# Patient Record
Sex: FEMALE | Race: WHITE | NOT HISPANIC OR LATINO | Employment: UNEMPLOYED | URBAN - METROPOLITAN AREA
[De-identification: names, ages, dates, MRNs, and addresses within clinical notes are randomized per-mention and may not be internally consistent; named-entity substitution may affect disease eponyms.]

---

## 2017-01-11 ENCOUNTER — LAB REQUISITION (OUTPATIENT)
Dept: LAB | Facility: HOSPITAL | Age: 42
End: 2017-01-11
Payer: COMMERCIAL

## 2017-01-11 DIAGNOSIS — R87.619 ABNORMAL CYTOLOGICAL FINDING IN SPECIMEN FROM CERVIX UTERI: ICD-10-CM

## 2017-01-11 PROCEDURE — 88305 TISSUE EXAM BY PATHOLOGIST: CPT | Performed by: OBSTETRICS & GYNECOLOGY

## 2017-01-13 ENCOUNTER — GENERIC CONVERSION - ENCOUNTER (OUTPATIENT)
Dept: OTHER | Facility: OTHER | Age: 42
End: 2017-01-13

## 2017-01-14 LAB — INTERPRETATION (HISTORICAL): NORMAL

## 2017-01-15 ENCOUNTER — GENERIC CONVERSION - ENCOUNTER (OUTPATIENT)
Dept: OTHER | Facility: OTHER | Age: 42
End: 2017-01-15

## 2017-01-18 ENCOUNTER — ALLSCRIPTS OFFICE VISIT (OUTPATIENT)
Dept: OTHER | Facility: OTHER | Age: 42
End: 2017-01-18

## 2017-02-22 ENCOUNTER — ALLSCRIPTS OFFICE VISIT (OUTPATIENT)
Dept: OTHER | Facility: OTHER | Age: 42
End: 2017-02-22

## 2017-02-22 LAB
BILIRUB UR QL STRIP: NORMAL
CLARITY UR: NORMAL
COLOR UR: NORMAL
GLUCOSE (HISTORICAL): NORMAL
HGB UR QL STRIP.AUTO: 250
KETONES UR STRIP-MCNC: 15 MG/DL
LEUKOCYTE ESTERASE UR QL STRIP: 500
NITRITE UR QL STRIP: NORMAL
PH UR STRIP.AUTO: 5 [PH]
PROT UR STRIP-MCNC: 30 MG/DL
SP GR UR STRIP.AUTO: 1.03
UROBILINOGEN UR QL STRIP.AUTO: 1

## 2017-02-26 LAB
CULTURE RESULT (HISTORICAL): NORMAL
MISCELLANEOUS LAB TEST RESULT (HISTORICAL): NORMAL

## 2017-03-01 RX ORDER — DIPHENOXYLATE HYDROCHLORIDE AND ATROPINE SULFATE 2.5; .025 MG/1; MG/1
1 TABLET ORAL EVERY MORNING
COMMUNITY
End: 2018-04-01

## 2017-03-01 RX ORDER — ONDANSETRON 4 MG/1
4 TABLET, FILM COATED ORAL EVERY 8 HOURS PRN
Status: ON HOLD | COMMUNITY
End: 2018-04-15

## 2017-03-01 RX ORDER — NAPROXEN 500 MG/1
500 TABLET ORAL AS NEEDED
COMMUNITY
End: 2018-04-02 | Stop reason: HOSPADM

## 2017-03-01 RX ORDER — MULTIVIT WITH MINERALS/LUTEIN
1000 TABLET ORAL EVERY MORNING
COMMUNITY
End: 2018-04-01

## 2017-03-01 RX ORDER — LEVOTHYROXINE SODIUM 0.1 MG/1
125 TABLET ORAL EVERY MORNING
COMMUNITY
End: 2018-09-17 | Stop reason: SDUPTHER

## 2017-03-02 ENCOUNTER — HOSPITAL ENCOUNTER (OUTPATIENT)
Facility: HOSPITAL | Age: 42
Setting detail: OUTPATIENT SURGERY
Discharge: HOME | End: 2017-03-02
Attending: OBSTETRICS & GYNECOLOGY | Admitting: OBSTETRICS & GYNECOLOGY
Payer: COMMERCIAL

## 2017-03-02 ENCOUNTER — ANESTHESIA (OUTPATIENT)
Dept: PERIOP | Facility: HOSPITAL | Age: 42
End: 2017-03-02
Payer: COMMERCIAL

## 2017-03-02 ENCOUNTER — ANESTHESIA EVENT (OUTPATIENT)
Dept: PERIOP | Facility: HOSPITAL | Age: 42
End: 2017-03-02
Payer: COMMERCIAL

## 2017-03-02 VITALS
HEIGHT: 64 IN | DIASTOLIC BLOOD PRESSURE: 94 MMHG | RESPIRATION RATE: 20 BRPM | TEMPERATURE: 98.2 F | SYSTOLIC BLOOD PRESSURE: 146 MMHG | WEIGHT: 195 LBS | HEART RATE: 62 BPM | BODY MASS INDEX: 33.29 KG/M2 | OXYGEN SATURATION: 94 %

## 2017-03-02 DIAGNOSIS — N87.1 MODERATE CERVICAL DYSPLASIA: ICD-10-CM

## 2017-03-02 PROBLEM — D06.9 CIN III (CERVICAL INTRAEPITHELIAL NEOPLASIA III): Status: ACTIVE | Noted: 2017-03-02

## 2017-03-02 LAB — EXT PREGNANCY TEST URINE: NEGATIVE

## 2017-03-02 PROCEDURE — 88344 IMHCHEM/IMCYTCHM EA MLT ANTB: CPT | Performed by: OBSTETRICS & GYNECOLOGY

## 2017-03-02 PROCEDURE — 88307 TISSUE EXAM BY PATHOLOGIST: CPT | Performed by: OBSTETRICS & GYNECOLOGY

## 2017-03-02 PROCEDURE — 81025 URINE PREGNANCY TEST: CPT | Performed by: OBSTETRICS & GYNECOLOGY

## 2017-03-02 RX ORDER — SODIUM CHLORIDE, SODIUM LACTATE, POTASSIUM CHLORIDE, CALCIUM CHLORIDE 600; 310; 30; 20 MG/100ML; MG/100ML; MG/100ML; MG/100ML
100 INJECTION, SOLUTION INTRAVENOUS CONTINUOUS
Status: DISCONTINUED | OUTPATIENT
Start: 2017-03-02 | End: 2017-03-02 | Stop reason: HOSPADM

## 2017-03-02 RX ORDER — ONDANSETRON 2 MG/ML
INJECTION INTRAMUSCULAR; INTRAVENOUS AS NEEDED
Status: DISCONTINUED | OUTPATIENT
Start: 2017-03-02 | End: 2017-03-02 | Stop reason: SURG

## 2017-03-02 RX ORDER — FENTANYL CITRATE/PF 50 MCG/ML
25 SYRINGE (ML) INJECTION
Status: DISCONTINUED | OUTPATIENT
Start: 2017-03-02 | End: 2017-03-02 | Stop reason: HOSPADM

## 2017-03-02 RX ORDER — FENTANYL CITRATE 50 UG/ML
INJECTION, SOLUTION INTRAMUSCULAR; INTRAVENOUS AS NEEDED
Status: DISCONTINUED | OUTPATIENT
Start: 2017-03-02 | End: 2017-03-02 | Stop reason: SURG

## 2017-03-02 RX ORDER — LIDOCAINE HYDROCHLORIDE 10 MG/ML
INJECTION, SOLUTION INFILTRATION; PERINEURAL AS NEEDED
Status: DISCONTINUED | OUTPATIENT
Start: 2017-03-02 | End: 2017-03-02 | Stop reason: SURG

## 2017-03-02 RX ORDER — SODIUM CHLORIDE, SODIUM LACTATE, POTASSIUM CHLORIDE, CALCIUM CHLORIDE 600; 310; 30; 20 MG/100ML; MG/100ML; MG/100ML; MG/100ML
INJECTION, SOLUTION INTRAVENOUS CONTINUOUS PRN
Status: DISCONTINUED | OUTPATIENT
Start: 2017-03-02 | End: 2017-03-02 | Stop reason: SURG

## 2017-03-02 RX ORDER — ONDANSETRON 2 MG/ML
4 INJECTION INTRAMUSCULAR; INTRAVENOUS ONCE
Status: DISCONTINUED | OUTPATIENT
Start: 2017-03-02 | End: 2017-03-02 | Stop reason: HOSPADM

## 2017-03-02 RX ORDER — MIDAZOLAM HYDROCHLORIDE 1 MG/ML
INJECTION INTRAMUSCULAR; INTRAVENOUS AS NEEDED
Status: DISCONTINUED | OUTPATIENT
Start: 2017-03-02 | End: 2017-03-02 | Stop reason: SURG

## 2017-03-02 RX ORDER — LIDOCAINE HYDROCHLORIDE 10 MG/ML
INJECTION, SOLUTION EPIDURAL; INFILTRATION; INTRACAUDAL; PERINEURAL AS NEEDED
Status: DISCONTINUED | OUTPATIENT
Start: 2017-03-02 | End: 2017-03-02 | Stop reason: HOSPADM

## 2017-03-02 RX ORDER — KETOROLAC TROMETHAMINE 30 MG/ML
60 INJECTION, SOLUTION INTRAMUSCULAR; INTRAVENOUS ONCE
Status: COMPLETED | OUTPATIENT
Start: 2017-03-02 | End: 2017-03-02

## 2017-03-02 RX ORDER — PROPOFOL 10 MG/ML
INJECTION, EMULSION INTRAVENOUS AS NEEDED
Status: DISCONTINUED | OUTPATIENT
Start: 2017-03-02 | End: 2017-03-02 | Stop reason: SURG

## 2017-03-02 RX ADMIN — FENTANYL CITRATE 50 MCG: 50 INJECTION, SOLUTION INTRAMUSCULAR; INTRAVENOUS at 08:08

## 2017-03-02 RX ADMIN — SODIUM CHLORIDE, SODIUM LACTATE, POTASSIUM CHLORIDE, AND CALCIUM CHLORIDE 100 ML/HR: .6; .31; .03; .02 INJECTION, SOLUTION INTRAVENOUS at 10:03

## 2017-03-02 RX ADMIN — PROPOFOL 200 MG: 10 INJECTION, EMULSION INTRAVENOUS at 07:58

## 2017-03-02 RX ADMIN — FENTANYL CITRATE 50 MCG: 50 INJECTION, SOLUTION INTRAMUSCULAR; INTRAVENOUS at 07:58

## 2017-03-02 RX ADMIN — ONDANSETRON 4 MG: 2 INJECTION INTRAMUSCULAR; INTRAVENOUS at 08:21

## 2017-03-02 RX ADMIN — MIDAZOLAM HYDROCHLORIDE 2 MG: 1 INJECTION, SOLUTION INTRAMUSCULAR; INTRAVENOUS at 07:55

## 2017-03-02 RX ADMIN — KETOROLAC TROMETHAMINE 60 MG: 30 INJECTION, SOLUTION INTRAMUSCULAR at 09:24

## 2017-03-02 RX ADMIN — LIDOCAINE HYDROCHLORIDE 50 MG: 10 INJECTION, SOLUTION INFILTRATION; PERINEURAL at 07:58

## 2017-03-02 RX ADMIN — DEXAMETHASONE SODIUM PHOSPHATE 8 MG: 10 INJECTION INTRAMUSCULAR; INTRAVENOUS at 08:02

## 2017-03-02 RX ADMIN — SODIUM CHLORIDE, SODIUM LACTATE, POTASSIUM CHLORIDE, AND CALCIUM CHLORIDE: .6; .31; .03; .02 INJECTION, SOLUTION INTRAVENOUS at 07:54

## 2017-11-29 ENCOUNTER — GENERIC CONVERSION - ENCOUNTER (OUTPATIENT)
Dept: OTHER | Facility: OTHER | Age: 42
End: 2017-11-29

## 2017-12-29 ENCOUNTER — GENERIC CONVERSION - ENCOUNTER (OUTPATIENT)
Dept: OTHER | Facility: OTHER | Age: 42
End: 2017-12-29

## 2017-12-29 LAB
BASOPHILS # BLD AUTO: 0.1 X10E3/UL (ref 0–0.2)
BASOPHILS # BLD AUTO: 1 %
DEPRECATED RDW RBC AUTO: 13.3 % (ref 12.3–15.4)
EOSINOPHIL # BLD AUTO: 0.2 X10E3/UL (ref 0–0.4)
EOSINOPHIL # BLD AUTO: 3 %
HCT VFR BLD AUTO: 41.7 % (ref 34–46.6)
HGB BLD-MCNC: 14.1 G/DL (ref 11.1–15.9)
IMM.GRANULOCYTES (CD4/8) (HISTORICAL): 0 %
IMM.GRANULOCYTES (CD4/8) (HISTORICAL): 0 X10E3/UL (ref 0–0.1)
LYMPHOCYTES # BLD AUTO: 2.7 X10E3/UL (ref 0.7–3.1)
LYMPHOCYTES # BLD AUTO: 38 %
MCH RBC QN AUTO: 31.9 PG (ref 26.6–33)
MCHC RBC AUTO-ENTMCNC: 33.8 G/DL (ref 31.5–35.7)
MCV RBC AUTO: 94 FL (ref 79–97)
MONOCYTES # BLD AUTO: 0.5 X10E3/UL (ref 0.1–0.9)
MONOCYTES (HISTORICAL): 8 %
NEUTROPHILS # BLD AUTO: 3.5 X10E3/UL (ref 1.4–7)
NEUTROPHILS # BLD AUTO: 50 %
PLATELET # BLD AUTO: 220 X10E3/UL (ref 150–379)
RBC (HISTORICAL): 4.42 X10E6/UL (ref 3.77–5.28)
WBC # BLD AUTO: 7.1 X10E3/UL (ref 3.4–10.8)

## 2017-12-30 ENCOUNTER — GENERIC CONVERSION - ENCOUNTER (OUTPATIENT)
Dept: OTHER | Facility: OTHER | Age: 42
End: 2017-12-30

## 2017-12-30 LAB
25(OH)D3 SERPL-MCNC: 24.3 NG/ML (ref 30–100)
A/G RATIO (HISTORICAL): 1.5 (ref 1.2–2.2)
ALBUMIN SERPL BCP-MCNC: 4.1 G/DL (ref 3.5–5.5)
ALP SERPL-CCNC: 87 IU/L (ref 39–117)
ALT SERPL W P-5'-P-CCNC: 11 IU/L (ref 0–32)
AST SERPL W P-5'-P-CCNC: 13 IU/L (ref 0–40)
BILIRUB SERPL-MCNC: 0.4 MG/DL (ref 0–1.2)
BUN SERPL-MCNC: 10 MG/DL (ref 6–24)
BUN/CREA RATIO (HISTORICAL): 15 (ref 9–23)
CALCIUM SERPL-MCNC: 8.9 MG/DL (ref 8.7–10.2)
CHLORIDE SERPL-SCNC: 100 MMOL/L (ref 96–106)
CHOLEST SERPL-MCNC: 227 MG/DL (ref 100–199)
CHOLEST/HDLC SERPL: 4.9 RATIO UNITS (ref 0–4.4)
CO2 SERPL-SCNC: 26 MMOL/L (ref 18–29)
CREAT SERPL-MCNC: 0.68 MG/DL (ref 0.57–1)
EGFR AFRICAN AMERICAN (HISTORICAL): 126 ML/MIN/1.73
EGFR-AMERICAN CALC (HISTORICAL): 109 ML/MIN/1.73
GLUCOSE SERPL-MCNC: 114 MG/DL (ref 65–99)
HDLC SERPL-MCNC: 46 MG/DL
INTERPRETATION (HISTORICAL): NORMAL
LDLC SERPL CALC-MCNC: 147 MG/DL (ref 0–99)
POTASSIUM SERPL-SCNC: 3.8 MMOL/L (ref 3.5–5.2)
SODIUM SERPL-SCNC: 140 MMOL/L (ref 134–144)
T4 FREE SERPL-MCNC: 6.7 UG/DL (ref 4.5–12)
TOT. GLOBULIN, SERUM (HISTORICAL): 2.8 G/DL (ref 1.5–4.5)
TOTAL PROTEIN (HISTORICAL): 6.9 G/DL (ref 6–8.5)
TRIGL SERPL-MCNC: 171 MG/DL (ref 0–149)
TSH SERPL DL<=0.05 MIU/L-ACNC: 5.69 UIU/ML (ref 0.45–4.5)
VLDLC SERPL CALC-MCNC: 34 MG/DL (ref 5–40)

## 2018-01-03 ENCOUNTER — GENERIC CONVERSION - ENCOUNTER (OUTPATIENT)
Dept: OTHER | Facility: OTHER | Age: 43
End: 2018-01-03

## 2018-01-10 ENCOUNTER — GENERIC CONVERSION - ENCOUNTER (OUTPATIENT)
Dept: OTHER | Facility: OTHER | Age: 43
End: 2018-01-10

## 2018-01-10 ENCOUNTER — ALLSCRIPTS OFFICE VISIT (OUTPATIENT)
Dept: OTHER | Facility: OTHER | Age: 43
End: 2018-01-10

## 2018-01-12 VITALS
DIASTOLIC BLOOD PRESSURE: 96 MMHG | WEIGHT: 199 LBS | RESPIRATION RATE: 16 BRPM | BODY MASS INDEX: 33.97 KG/M2 | SYSTOLIC BLOOD PRESSURE: 136 MMHG | HEART RATE: 84 BPM | TEMPERATURE: 96.1 F | HEIGHT: 64 IN

## 2018-01-12 NOTE — RESULT NOTES
Verified Results  (1) CBC/PLT/DIFF [id number] 07:32AM Lenny Carrillo     Test Name Result Flag Reference   WBC 6 6 x10E3/uL  3 4-10 8   RBC 4 31 x10E6/uL  3 77-5 28   Hemoglobin 13 8 g/dL  11 1-15 9   Hematocrit 41 7 %  34 0-46  6   MCV 97 fL  79-97   MCH 32 0 pg  26 6-33 0   MCHC 33 1 g/dL  31 5-35 7   RDW 14 4 %  12 3-15 4   Platelets 549 A14F4/RE  150-379   Neutrophils 47 %     Lymphs 44 %     Monocytes 7 %     Eos 2 %     Basos 0 %     Neutrophils (Absolute) 3 1 x10E3/uL  1 4-7 0   Lymphs (Absolute) 2 9 x10E3/uL  0 7-3 1   Monocytes(Absolute) 0 5 x10E3/uL  0 1-0 9   Eos (Absolute) 0 2 x10E3/uL  0 0-0 4   Baso (Absolute) 0 0 x10E3/uL  0 0-0 2   Immature Granulocytes 0 %     Immature Grans (Abs) 0 0 x10E3/uL  0 0-0 1     (1) COMPREHENSIVE METABOLIC PANEL [id number] 82:36VH Lenny Carrillo     Test Name Result Flag Reference   Glucose, Serum 101 mg/dL H 65-99   BUN 12 mg/dL  6-24   Creatinine, Serum 0 78 mg/dL  0 57-1 00   eGFR If NonAfricn Am 95 mL/min/1 73  >59   eGFR If Africn Am 109 mL/min/1 73  >59   BUN/Creatinine Ratio 15  9-23   Sodium, Serum 141 mmol/L  134-144   Potassium, Serum 4 1 mmol/L  3 5-5 2   Chloride, Serum 99 mmol/L     Carbon Dioxide, Total 26 mmol/L  18-29   Calcium, Serum 9 1 mg/dL  8 7-10 2   Protein, Total, Serum 6 7 g/dL  6 0-8 5   Albumin, Serum 3 9 g/dL  3 5-5 5   Globulin, Total 2 8 g/dL  1 5-4 5   A/G Ratio 1 4  1 1-2 5   Bilirubin, Total 0 5 mg/dL  0 0-1 2   Alkaline Phosphatase, S 83 IU/L     AST (SGOT) 12 IU/L  0-40   ALT (SGPT) 10 IU/L  0-32     (1) LIPID PANEL, FASTING [id number] 07:32AM Lenny Carrillo     Test Name Result Flag Reference   Cholesterol, Total 241 mg/dL H 100-199   Triglycerides 107 mg/dL  0-149   HDL Cholesterol 42 mg/dL  >39   VLDL Cholesterol Ezra 21 mg/dL  5-40   LDL Cholesterol Calc 178 mg/dL H 0-99   T  Chol/HDL Ratio 5 7 ratio units H 0 0-4 4   T   Chol/HDL Ratio                                                             Men  Women 1/2 Avg  Risk  3 4    3 3                                                   Avg Risk  5 0    4 4                                                2X Avg  Risk  9 6    7 1                                                3X Avg  Risk 23 4   11 0     (1) HEMOGLOBIN A1C [id number] 07:32AM Caterina Carrillo     Test Name Result Flag Reference   Hemoglobin A1c 6 0 % H 4 8-5 6   Pre-diabetes: 5 7 - 6 4           Diabetes: >6 4           Glycemic control for adults with diabetes: <7 0     [facility]) Cardiovascular Risk Assessment [id number] 07:32AM Caterina Carrillo     Test Name Result Flag Reference   Interpretation Note     Supplement report is available  PDF Image

## 2018-01-13 NOTE — PROGRESS NOTES
Assessment    1  Encounter for preventive health examination (V70 0) (Z00 00)   2  Adult BMI 34 0-34 9 kg/sq m (V85 34) (Z68 34)    Plan  Hypothyroidism    · (1) TSH WITH FT4 REFLEX; Status:Active; Requested YKD:[id number];    · Follow-up visit in 1 year Evaluation and Treatment  Follow-up  Status: Complete  Done:  [id number]    Discussion/Summary  health maintenance visit Currently, she eats a healthy diet and has an adequate exercise regimen  cervical cancer screening is managed by gyn Breast cancer screening: the risks and benefits of breast cancer screening were discussed, mammogram has been ordered and the patient declines breast cancer screening  Colorectal cancer screening: colorectal cancer screening is not indicated  The labs reviewed by patient  Advice and education were given regarding nutrition, aerobic exercise, weight loss, reproductive health and contraception  Patient discussion: discussed with the patient  Chief Complaint  cpe      History of Present Illness  HM, Adult Female: The patient is being seen for a health maintenance evaluation  The last health maintenance visit was 12 month(s) ago  General Health: The patient's health since the last visit is described as good  She has regular dental visits  She denies vision problems  She denies hearing loss  Immunizations status: up to date  Lifestyle:  She consumes a diverse and healthy diet  She has weight concerns  She exercises regularly  She exercises 3 or more times per week  Exercise includes walking and aerobic conditioning  She does not use tobacco  She denies alcohol use  She denies drug use  Reproductive health: the patient is premenopausal    Screening: cancer screening reviewed and updated  metabolic screening reviewed and updated  risk screening reviewed and updated  HPI: Here for CPE  Has had bariatric surgery and has lost about 100 pounds   Still having a lot of difficulty with nausea and will address this with her gastroenterologist       Review of Systems    Constitutional: No fever, no chills, feels well, no tiredness, no recent weight gain or weight loss  Eyes: No complaints of eye pain, no red eyes, no eyesight problems, no discharge, no dry eyes, no itching of eyes  ENT: no complaints of earache, no loss of hearing, no nose bleeds, no nasal discharge, no sore throat, no hoarseness  Cardiovascular: No complaints of slow heart rate, no fast heart rate, no chest pain, no palpitations, no leg claudication, no lower extremity edema  Respiratory: No complaints of shortness of breath, no wheezing, no cough, no SOB on exertion, no orthopnea, no PND  Gastrointestinal: as noted in HPI  Genitourinary: No complaints of dysuria, no incontinence, no pelvic pain, no dysmenorrhea, no vaginal discharge or bleeding  Musculoskeletal: No complaints of arthralgias, no myalgias, no joint swelling or stiffness, no limb pain or swelling  Integumentary: No complaints of skin rash or lesions, no itching, no skin wounds, no breast pain or lump  Neurological: No complaints of headache, no confusion, no convulsions, no numbness, no dizziness or fainting, no tingling, no limb weakness, no difficulty walking  Psychiatric: Not suicidal, no sleep disturbance, no anxiety or depression, no change in personality, no emotional problems  Endocrine: No complaints of proptosis, no hot flashes, no muscle weakness, no deepening of the voice, no feelings of weakness  Hematologic/Lymphatic: No complaints of swollen glands, no swollen glands in the neck, does not bleed easily, does not bruise easily  Active Problems    1  Anxiety (300 00) (F41 9)   2  Benign essential hypertension (401 1) (I10)   3  Contusion of bone (924 9) (T14 8)   4  Hyperlipemia, mixed (272 2) (E78 2)   5  Hypothyroidism (244 9) (E03 9)   6  Incomplete RBBB (426 4) (I45 10)   7  Need for vaccine for TD (tetanus-diphtheria) (V06 5) (Z23)   8   Pre-op exam (V72 84) (Z01 818)   9  Psoriasis (696 1) (L40 9)   10  Sleep apnea (780 57) (G47 30)   11  Vitamin D deficiency (268 9) (E55 9)    Past Medical History    · History of Acute upper respiratory infection (465 9) (J06 9)   · History of Ovarian cyst (620 2) (N83 20)    Surgical History    · History of Tonsillectomy    Family History  Mother    · Family history of Diabetes mellitus  Father    · Family history of Diabetes mellitus    Social History    · Caffeine use (V49 89) (F15 90)   · Former smoker (V15 82) (N70 340)   · Occasional alcohol use   · Uses marijuana (305 20) (F12 90)    Current Meds   1  Allegra CAPS; 1 TABLET DAILY Recorded   2  Levothyroxine Sodium 100 MCG Oral Tablet; Take 1 tablet daily  Requested for:   29GHX9704; Last Rx:[id number] Ordered   3  PARoxetine HCl - 10 MG Oral Tablet; TAKE THREE TABLETS BY MOUTH EVERY DAY; Therapy: 63BKQ8063 to (Evaluate:26Jan2017)  Requested for: 19Ajh2307; Last   Rx:50Lxg4932 Ordered    Allergies    1  naproxen    2  Almonds   3  Bee sting   4  Soy    Vitals   Recorded: 57CTX5302 11:44AM   Temperature 96 1 F   Heart Rate 84   Respiration 16   Systolic 292   Diastolic 96   Height 5 ft 4 in   Weight 199 lb    BMI Calculated 34 16   BSA Calculated 1 96     Physical Exam    Constitutional   General appearance: No acute distress, well appearing and well nourished  Eyes   Conjunctiva and lids: No swelling, erythema or discharge  Pupils and irises: Equal, round, reactive to light  Ears, Nose, Mouth, and Throat   External inspection of ears and nose: Normal     Otoscopic examination: Tympanic membranes translucent with normal light reflex  Canals patent without erythema  Hearing: Normal     Nasal mucosa, septum, and turbinates: Normal without edema or erythema  Lips, teeth, and gums: Normal, good dentition  Oropharynx: Normal with no erythema, edema, exudate or lesions  Neck   Neck: Supple, symmetric, trachea midline, no masses  Thyroid: Normal, no thyromegaly  Pulmonary   Respiratory effort: No increased work of breathing or signs of respiratory distress  Percussion of chest: Normal     Palpation of chest: Normal     Auscultation of lungs: Clear to auscultation  Cardiovascular   Palpation of heart: Normal PMI, no thrills  Auscultation of heart: Normal rate and rhythm, normal S1 and S2, no murmurs  Carotid pulses: 2+ bilaterally  Abdominal aorta: Normal     Femoral pulses: 2+ bilaterally  Pedal pulses: 2+ bilaterally  Examination of extremities for edema and/or varicosities: Normal     Abdomen   Abdomen: Non-tender, no masses  Liver and spleen: No hepatomegaly or splenomegaly  Examination for hernias: No hernia appreciated  Lymphatic   Palpation of lymph nodes in neck: No lymphadenopathy  Palpation of lymph nodes in axillae: No lymphadenopathy  Palpation of lymph nodes in groin: No lymphadenopathy  Palpation of lymph nodes in other areas: No lymphadenopathy  Musculoskeletal   Gait and station: Normal     Digits and nails: Normal without clubbing or cyanosis  Joints, bones, and muscles: Normal     Range of motion: Normal     Stability: Normal     Muscle strength/tone: Normal     Skin   Skin and subcutaneous tissue: Normal without rashes or lesions  Palpation of skin and subcutaneous tissue: Normal turgor  Neurologic   Cranial nerves: Cranial nerves II-XII intact  Reflexes: 2+ and symmetric  Sensation: No sensory loss  Psychiatric   Judgment and insight: Normal     Orientation to person, place, and time: Normal     Recent and remote memory: Intact  Mood and affect: Normal        Procedure    Procedure: Indication: routine screening     Results: 20/25 in both eyes without corrective device Pt forgot her glasses   Color vision was and the results were normal       Signatures   Electronically signed by : KARLA Alonso ; Jan 18 2017 12:17PM EST                       (Author)

## 2018-01-14 VITALS
HEART RATE: 82 BPM | DIASTOLIC BLOOD PRESSURE: 100 MMHG | RESPIRATION RATE: 18 BRPM | HEIGHT: 64 IN | SYSTOLIC BLOOD PRESSURE: 150 MMHG | WEIGHT: 204 LBS | TEMPERATURE: 97.2 F | BODY MASS INDEX: 34.83 KG/M2

## 2018-01-23 VITALS — DIASTOLIC BLOOD PRESSURE: 80 MMHG | SYSTOLIC BLOOD PRESSURE: 130 MMHG

## 2018-01-23 NOTE — PROGRESS NOTES
Assessment    1  Acute pain of left knee (932 26) (Y47 857)    Plan  Acute pain of left knee    · Naproxen 500 MG Oral Tablet; TAKE 1 TABLET 3 TIMES DAILY AS NEEDED   · * MRI KNEE LEFT  WO CONTRAST; Status:Need Information - Financial  Authorization,Retrospective By Protocol Authorization; Requested [id number];     Discussion/Summary    Darline Isidro has left knee pain that is concerning for possible MCL injury  Her exam is also concerning for possible meniscal tear  I will obtain an MRI at this time to evaluate both structures  I did prescribe naproxen for her for the pain  We did give her an Ace wrap for compression  She was not comfortable wearing a hinge brace today  I did advise her to continue icing the knee  Follow-up in the office after the MRI is complete  The treatment plan was reviewed with the patient/guardian  The patient/guardian understands and agrees with the treatment plan      Chief Complaint    1  Knee Pain    History of Present Illness  HPI: Darline Isidro is a 37year old female who presents with left-sided knee pain after a twist and fall injury she sustained about 4 weeks ago  She states that she was cleaning her garage when she turned suddenly and slammed her knee into a metal chair  She then slipped and fell  She has had medial sided knee discomfort and sharp pains ever since  Her pain is sharp and stabbing on the medial aspect of the knee and radiates posteriorly  The pain worsens with walking or bending of the knee  It improves with rest and percocet  She states she has been unable to ice due to the increased pain that it causes  She denies any previous knee injury  She does report some instability and strain sensation with bending the knee  Review of Systems    Constitutional: No fever, no chills, feels well, no tiredness, no recent weight gain or loss  Eyes: No complaints of eyesight problems, no red eyes  ENT: no loss of hearing, no nosebleeds, no sore throat     Cardiovascular: No complaints of chest pain, no palpitations, no leg claudication or lower extremity edema  Respiratory: no compliants of shortness of breath, no wheezing, no cough  Gastrointestinal: no complaints of abdominal pain, no constipation, no nausea or diarrhea, no vomiting, no bloody stools  Genitourinary: no complaints of dysuria, no incontinence  Musculoskeletal: as noted in HPI  Integumentary: no complaints of skin rash or lesion, no itching or dry skin, no skin wounds  Neurological: no complaints of headache, no confusion, no numbness or tingling, no dizziness  Endocrine: No complaints of muscle weakness, no feelings of weakness, no frequent urination, no excessive thirst    Psychiatric: No suicidal thoughts, no anxiety, no feelings of depression  Active Problems    1  Acute upper respiratory infection (465 9) (J06 9)   2  Acute UTI (599 0) (N39 0)   3  Anxiety (300 00) (F41 9)   4  Benign essential hypertension (401 1) (I10)   5  Diabetes mellitus (250 00) (E11 9)   6  Hematuria (599 70) (R31 9)   7  Hyperlipemia, mixed (272 2) (E78 2)   8  Hypokalemia (276 8) (E87 6)   9  Hypothyroidism (244 9) (E03 9)   10  Incomplete RBBB (426 4) (I45 10)   11  Morbid or severe obesity due to excess calories (278 01) (E66 01)   12  Need for vaccine for TD (tetanus-diphtheria) (V06 5) (Z23)   13  Pre-op exam (V72 84) (Z01 818)   14  Psoriasis (696 1) (L40 9)   15  Sleep apnea (780 57) (G47 30)   16  Vitamin D deficiency (268 9) (E55 9)    Past Medical History    · History of Ovarian cyst (620 2) (N83 20)    Surgical History    · History of Tonsillectomy    The surgical history was reviewed and updated today  Family History  Mother    · Family history of Diabetes mellitus  Father    · Family history of Diabetes mellitus    The family history was reviewed and updated today         Social History    · Caffeine use (V49 89) (F15 90)   · Former smoker (T65 38) (K42 245)   · Occasional alcohol use   · Uses marijuana (305 20) (F12 90)  The social history was reviewed and updated today  The social history was reviewed and is unchanged  Current Meds   1  Allegra CAPS; 1 TABLET DAILY Recorded   2  Levothyroxine Sodium 100 MCG Oral Tablet; Take 1 tablet daily  Requested for:   39QWP4399; Last Rx:97Khi9639 Ordered   3  PARoxetine HCl - 10 MG Oral Tablet; TAKE THREE TABLETS BY MOUTH EVERY DAY; Therapy: 08XWA0633 to (Evaluate:50Row2311)  Requested for: 70Bao6634; Last   Rx:33Qnn7062 Ordered    The medication list was reviewed and updated today  Allergies    1  No Known Drug Allergies    2  Almonds   3  Bee sting   4  Soy    Vitals  Signs    Systolic: 673, RUE, Sitting  Diastolic: 76, RUE, Sitting  Heart Rate: 80  Height: 5 ft 4 in  Weight: 198 lb   BMI Calculated: 33 99  BSA Calculated: 1 95    Physical Exam    Right Knee: Appearance: Normal  Tenderness: medial femoral condyle and medial joint line, but not over the lateral joint line  ROM: Full  Motor: Normal  Special Test: positive medial Sabine test and positive laxity on Valgus Stress, but negative patellar grind, negative patellofemoral apprehension test, negative lateral Sabine test, negative Lachman's test, negative Posterior Drawer sign and no laxity on Varus Stress  Constitutional - General appearance: Normal    Musculoskeletal - Gait and station: Normal  Digits and nails: Normal  Muscle strength/tone: Normal    Cardiovascular - Pulses: Normal  Examination of extremities for edema and/or varicosities: Normal    Skin - Skin and subcutaneous tissue: Normal    Neurologic - Sensation: Normal    Psychiatric - Orientation to person, place, and time: Normal  Mood and affect: Normal    Eyes   Conjunctiva and lids: Normal     Pupils and irises: Normal        Results/Data    Views: AP and lateral views and a sunrise view of the right knee     Findings: no fracture, normal alignment, no bony lesions, the soft tissues were normal and joint spaces were normal       Future Appointments    Date/Time Provider Specialty Site   09/28/2016 10:30 AM KARLA Carter   Orthopedic [address] [place] [address]     Signatures   Electronically signed by : NABIL Spann ; Sep 23 2016  2:35PM EST                       (Author)

## 2018-01-23 NOTE — RESULT NOTES
Verified Results  (1) TSH WITH FT4 REFLEX [id number] 08:42AM Margarito Carrillo     Test Name Result Flag Reference   TSH 5 690 uIU/mL H 0 450-4 500   Thyroxine (T4) 6 7 ug/dL  4 5-12 0       Plan  Hypothyroidism    · From  Levothyroxine Sodium 100 MCG Oral Tablet take one tablet by mouth  every day To Levothyroxine Sodium 112 MCG Oral Tablet (Synthroid) take one tablet by  mouth daily   · (1) TSH WITH FT4 REFLEX; Status:Active;  Requested for:78Crq1595;

## 2018-01-23 NOTE — RESULT NOTES
Verified Results  (1) CBC/PLT/DIFF [id number] 08:42AM Kamila Carrillo     Test Name Result Flag Reference   WBC 7 1 x10E3/uL  3 4-10 8   RBC 4 42 x10E6/uL  3 77-5 28   Hemoglobin 14 1 g/dL  11 1-15 9   Hematocrit 41 7 %  34 0-46  6   MCV 94 fL  79-97   MCH 31 9 pg  26 6-33 0   MCHC 33 8 g/dL  31 5-35 7   RDW 13 3 %  12 3-15 4   Platelets 670 M77B8/DE  150-379   Neutrophils 50 %  Not Estab  Lymphs 38 %  Not Estab  Monocytes 8 %  Not Estab  Eos 3 %  Not Estab  Basos 1 %  Not Estab  Neutrophils (Absolute) 3 5 x10E3/uL  1 4-7 0   Lymphs (Absolute) 2 7 x10E3/uL  0 7-3 1   Monocytes(Absolute) 0 5 x10E3/uL  0 1-0 9   Eos (Absolute) 0 2 x10E3/uL  0 0-0 4   Baso (Absolute) 0 1 x10E3/uL  0 0-0 2   Immature Granulocytes 0 %  Not Estab  Immature Grans (Abs) 0 0 x10E3/uL  0 0-0 1     (1) COMPREHENSIVE METABOLIC PANEL [id number] 67:53ZT BrettFAGUOKamila     Test Name Result Flag Reference   Glucose, Serum 114 mg/dL H 65-99   BUN 10 mg/dL  6-24   Creatinine, Serum 0 68 mg/dL  0 57-1 00   BUN/Creatinine Ratio 15  9-23   Sodium, Serum 140 mmol/L  134-144   Potassium, Serum 3 8 mmol/L  3 5-5 2   Chloride, Serum 100 mmol/L     Carbon Dioxide, Total 26 mmol/L  18-29   Calcium, Serum 8 9 mg/dL  8 7-10 2   Protein, Total, Serum 6 9 g/dL  6 0-8 5   Albumin, Serum 4 1 g/dL  3 5-5 5   Globulin, Total 2 8 g/dL  1 5-4 5   A/G Ratio 1 5  1 2-2 2   Bilirubin, Total 0 4 mg/dL  0 0-1 2   Alkaline Phosphatase, S 87 IU/L     AST (SGOT) 13 IU/L  0-40   ALT (SGPT) 11 IU/L  0-32   eGFR If NonAfricn Am 109 mL/min/1 73  >59   eGFR If Africn Am 126 mL/min/1 73  >59     (1) LIPID PANEL, FASTING [id number] 08:42AM Kamila Carrillo     Test Name Result Flag Reference   Cholesterol, Total 227 mg/dL H 100-199   Triglycerides 171 mg/dL H 0-149   HDL Cholesterol 46 mg/dL  >39   VLDL Cholesterol Ezra 34 mg/dL  5-40   LDL Cholesterol Calc 147 mg/dL H 0-99   T  Chol/HDL Ratio 4 9 ratio units H 0 0-4 4   T   Chol/HDL Ratio Men  Women                                               1/2 Avg  Risk  3 4    3 3                                                   Avg Risk  5 0    4 4                                                2X Avg  Risk  9 6    7 1                                                3X Avg  Risk 23 4   11 0     (1) VITAMIN D 25-HYDROXY [id number] 08:42AM Anjelica Carrillo     Test Name Result Flag Reference   Vitamin D, 25-Hydroxy 24 3 ng/mL L 30 0-100 0   Vitamin D deficiency has been defined by the [address] practice guideline as a  level of serum 25-OH vitamin D less than 20 ng/mL (1,2)  The Endocrine Society went on to further define vitamin D  insufficiency as a level between 21 and 29 ng/mL (2)  1  IOM ([place] [employer])  2010  Dietary reference     intakes for calcium and D  [facility]: The     [place]  2  Jesus MF, Alberto CANO, Amena MARTINI, et al      Evaluation, treatment, and prevention of vitamin D     deficiency: an Endocrine Society clinical practice     guideline  JCEM  2011 Jul; 96(7):1911-30  [facility]) Cardiovascular Risk Assessment [id number] 08:42AM Alex Crockett     Test Name Result Flag Reference   Interpretation Note     Supplemental report is available  PDF Image

## 2018-01-23 NOTE — PROGRESS NOTES
Assessment    1  Encounter for preventive health examination (V70 0) (Z00 00)   2  Adult BMI 37 0-37 9 kg/sq m (V85 37) (Z68 37)    Plan  Anxiety    · PARoxetine HCl - 10 MG Oral Tablet; TAKE THREE TABLETS BY MOUTH  EVERY DAY  Psoriasis    · Calcipotriene 0 005 % External Cream; APPLY A THIN LAYER TO ELBOWS  TWICE DAILY    Discussion/Summary  health maintenance visit Currently, she eats a healthy diet and has an adequate exercise regimen  cervical cancer screening is managed by gyn Breast cancer screening: monthly self breast exam was advised and the patient declines breast cancer screening  Colorectal cancer screening: the risks and benefits of colorectal cancer screening were discussed and colorectal cancer screening is not indicated  Advice and education were given regarding nutrition, aerobic exercise, weight loss, alcohol use, sunscreen use and seat belt use  Patient discussion: discussed with the patient  She will call if her premenstrual symptoms do not improve after a couple of cycles on her IUD  Follow up yearly or PRN  Chief Complaint  cpe      History of Present Illness  HM, Adult Female: The patient is being seen for a health maintenance evaluation  The last health maintenance visit was 12 month(s) ago  General Health: The patient's health since the last visit is described as good  She has regular dental visits  She denies vision problems  She denies hearing loss  Immunizations status: up to date  Lifestyle:  She consumes a diverse and healthy diet  She has weight concerns  She exercises regularly  She exercises 5 times per week  Exercise includes aerobic conditioning  She does not use tobacco  She denies alcohol use  She reports occasional alcohol use  She uses illicit drugs  Drug(s) abuse includes marijuana  Reproductive health: the patient is premenopausal    Screening: cancer screening reviewed and updated  metabolic screening reviewed and updated     risk screening reviewed and updated  HPI: Here for CPE  Feels well  She had been feeling fatigued but thyroid medication was recently adjusted and is now feeling better  Has depression the first day of her period despite taking her paxil daily  She is going for an IUD and she will wait to see if this helps  Review of Systems    Constitutional: No fever, no chills, feels well, no tiredness, no recent weight gain or weight loss  Eyes: No complaints of eye pain, no red eyes, no eyesight problems, no discharge, no dry eyes, no itching of eyes  ENT: no complaints of earache, no loss of hearing, no nose bleeds, no nasal discharge, no sore throat, no hoarseness  Cardiovascular: No complaints of slow heart rate, no fast heart rate, no chest pain, no palpitations, no leg claudication, no lower extremity edema  Respiratory: No complaints of shortness of breath, no wheezing, no cough, no SOB on exertion, no orthopnea, no PND  Gastrointestinal: No complaints of abdominal pain, no constipation, no nausea or vomiting, no diarrhea, no bloody stools  Genitourinary: No complaints of dysuria, no incontinence, no pelvic pain, no dysmenorrhea, no vaginal discharge or bleeding  Musculoskeletal: No complaints of arthralgias, no myalgias, no joint swelling or stiffness, no limb pain or swelling  Integumentary: No complaints of skin rash or lesions, no itching, no skin wounds, no breast pain or lump  Neurological: No complaints of headache, no confusion, no convulsions, no numbness, no dizziness or fainting, no tingling, no limb weakness, no difficulty walking  Psychiatric: Not suicidal, no sleep disturbance, no anxiety or depression, no change in personality, no emotional problems  Endocrine: No complaints of proptosis, no hot flashes, no muscle weakness, no deepening of the voice, no feelings of weakness     Hematologic/Lymphatic: No complaints of swollen glands, no swollen glands in the neck, does not bleed easily, does not bruise easily  Active Problems    1  Anxiety (300 00) (F41 9)   2  Benign essential hypertension (401 1) (I10)   3  Former smoker (W86 625)   4  Hyperlipemia, mixed (272 2) (E78 2)   5  Hypokalemia (276 8) (E87 6)   6  Hypothyroidism (244 9) (E03 9)   7  Incomplete RBBB (426 4) (I45 10)   8  Psoriasis (696 1) (L40 9)   9  Sleep apnea (780 57) (G47 30)   10  Vitamin D deficiency (268 9) (E55 9)    Past Medical History    · History of Acute upper respiratory infection (465 9) (J06 9)   · History of Ovarian cyst (620 2) (N83 20)    Surgical History    · History of Tonsillectomy    Family History  Mother    · Family history of Diabetes mellitus  Father    · Family history of Diabetes mellitus    Social History    · Caffeine use (V49 89) (F15 90)   · Former smoker (J99 889)   · Occasional alcohol use   · Uses marijuana (305 20) (F12 90)    Current Meds   1  Allegra CAPS; 1 TABLET DAILY Recorded   2  Levothyroxine Sodium 112 MCG Oral Tablet; take one tablet by mouth daily; Therapy: 84HWQ4160 to (Emily Matter)  Requested for: 72FNV4756; Last   Rx:03Jan2018 Ordered   3  PARoxetine HCl - 10 MG Oral Tablet; TAKE THREE TABLETS BY MOUTH EVERY DAY; Therapy: 61JDT5542 to (Evaluate:26Jan2018)  Requested for: 39IPI3282; Last   Rx:27Nov2017 Ordered    Allergies    1  Almonds   2  Bee sting   3  Soy    Vitals   Recorded: 77PMQ1381 09:40AM Recorded: 74LRU0265 09:18AM   Temperature  97 8 F   Heart Rate  84   Respiration  16   Systolic 439 170   Diastolic 80 94   BP Comments recheck    Height  5 ft 4 in   Weight  221 lb    BMI Calculated  37 93   BSA Calculated  2 04     Physical Exam    Constitutional   General appearance: Abnormal   obese  Eyes   Conjunctiva and lids: No swelling, erythema or discharge  Pupils and irises: Equal, round, reactive to light  Ears, Nose, Mouth, and Throat   External inspection of ears and nose: Normal     Otoscopic examination: Tympanic membranes translucent with normal light reflex   Canals patent without erythema  Hearing: Normal     Nasal mucosa, septum, and turbinates: Normal without edema or erythema  Lips, teeth, and gums: Normal, good dentition  Oropharynx: Normal with no erythema, edema, exudate or lesions  Neck   Neck: Supple, symmetric, trachea midline, no masses  Thyroid: Normal, no thyromegaly  Pulmonary   Respiratory effort: No increased work of breathing or signs of respiratory distress  Percussion of chest: Normal     Palpation of chest: Normal     Auscultation of lungs: Clear to auscultation  Cardiovascular   Palpation of heart: Normal PMI, no thrills  Auscultation of heart: Normal rate and rhythm, normal S1 and S2, no murmurs  Carotid pulses: 2+ bilaterally  Abdominal aorta: Normal     Femoral pulses: 2+ bilaterally  Pedal pulses: 2+ bilaterally  Examination of extremities for edema and/or varicosities: Normal     Abdomen   Abdomen: Non-tender, no masses  Liver and spleen: No hepatomegaly or splenomegaly  Examination for hernias: No hernia appreciated  Lymphatic   Palpation of lymph nodes in neck: No lymphadenopathy  Palpation of lymph nodes in axillae: No lymphadenopathy  Palpation of lymph nodes in groin: No lymphadenopathy  Palpation of lymph nodes in other areas: No lymphadenopathy  Musculoskeletal   Gait and station: Normal     Digits and nails: Normal without clubbing or cyanosis  Joints, bones, and muscles: Normal     Range of motion: Normal     Stability: Normal     Muscle strength/tone: Normal     Skin   Skin and subcutaneous tissue: Normal without rashes or lesions  Palpation of skin and subcutaneous tissue: Normal turgor  Neurologic   Cranial nerves: Cranial nerves II-XII intact  Reflexes: 2+ and symmetric  Sensation: No sensory loss  Psychiatric   Judgment and insight: Normal     Orientation to person, place, and time: Normal     Recent and remote memory: Intact      Mood and affect: Normal  Results/Data  PHQ-2 Adult Depression Screening 44OOA8133 09:24AM User, Ahs     Test Name Result Flag Reference   PHQ-2 Adult Depression Score 0     Over the last two weeks, how often have you been bothered by any of the following problems? Little interest or pleasure in doing things: Not at all - 0  Feeling down, depressed, or hopeless: Not at all - 0   PHQ-2 Adult Depression Screening Negative         Procedure    Procedure: Indication: routine screening     Results: 20/20 in both eyes with corrective device, 20/20 in the right eye with corrective device, 20/20 in the left eye with corrective device   Color vision was and the results were normal       Signatures   Electronically signed by : Lavina Gosselin, M D ; Contreras 10 2018  9:45AM EST                       (Author)

## 2018-01-24 VITALS
DIASTOLIC BLOOD PRESSURE: 94 MMHG | HEIGHT: 64 IN | BODY MASS INDEX: 37.73 KG/M2 | RESPIRATION RATE: 16 BRPM | WEIGHT: 221 LBS | TEMPERATURE: 97.8 F | SYSTOLIC BLOOD PRESSURE: 154 MMHG | HEART RATE: 84 BPM

## 2018-04-01 ENCOUNTER — APPOINTMENT (EMERGENCY)
Dept: RADIOLOGY | Facility: HOSPITAL | Age: 43
End: 2018-04-01
Payer: COMMERCIAL

## 2018-04-01 ENCOUNTER — HOSPITAL ENCOUNTER (OUTPATIENT)
Facility: HOSPITAL | Age: 43
Setting detail: OBSERVATION
Discharge: HOME | End: 2018-04-02
Attending: EMERGENCY MEDICINE | Admitting: FAMILY MEDICINE
Payer: COMMERCIAL

## 2018-04-01 DIAGNOSIS — R51.9 INTRACTABLE HEADACHE: Primary | ICD-10-CM

## 2018-04-01 DIAGNOSIS — M54.2 NECK PAIN: ICD-10-CM

## 2018-04-01 PROBLEM — Z98.84 STATUS POST LAPAROSCOPIC SLEEVE GASTRECTOMY: Status: ACTIVE | Noted: 2018-04-01

## 2018-04-01 PROBLEM — E03.9 HYPOTHYROIDISM: Status: ACTIVE | Noted: 2018-04-01

## 2018-04-01 LAB
ANION GAP SERPL CALCULATED.3IONS-SCNC: 6 MMOL/L (ref 4–13)
APTT PPP: 25 SECONDS (ref 23–35)
BASOPHILS # BLD AUTO: 0 THOUSANDS/ΜL (ref 0–0.1)
BASOPHILS NFR BLD AUTO: 1 % (ref 0–1)
BUN SERPL-MCNC: 8 MG/DL (ref 5–25)
CALCIUM SERPL-MCNC: 8.8 MG/DL (ref 8.3–10.1)
CHLORIDE SERPL-SCNC: 102 MMOL/L (ref 100–108)
CO2 SERPL-SCNC: 32 MMOL/L (ref 21–32)
CREAT SERPL-MCNC: 0.73 MG/DL (ref 0.6–1.3)
EOSINOPHIL # BLD AUTO: 0.2 THOUSAND/ΜL (ref 0–0.61)
EOSINOPHIL NFR BLD AUTO: 4 % (ref 0–6)
ERYTHROCYTE [DISTWIDTH] IN BLOOD BY AUTOMATED COUNT: 15 % (ref 11.6–15.1)
GFR SERPL CREATININE-BSD FRML MDRD: 102 ML/MIN/1.73SQ M
GLUCOSE SERPL-MCNC: 116 MG/DL (ref 65–140)
HCT VFR BLD AUTO: 40.4 % (ref 37–47)
HGB BLD-MCNC: 13.7 G/DL (ref 12–16)
INR PPP: 0.95 (ref 0.86–1.16)
LYMPHOCYTES # BLD AUTO: 2.4 THOUSANDS/ΜL (ref 0.6–4.47)
LYMPHOCYTES NFR BLD AUTO: 37 % (ref 14–44)
MCH RBC QN AUTO: 32.8 PG (ref 27–31)
MCHC RBC AUTO-ENTMCNC: 33.9 G/DL (ref 31.4–37.4)
MCV RBC AUTO: 97 FL (ref 82–98)
MONOCYTES # BLD AUTO: 0.5 THOUSAND/ΜL (ref 0.17–1.22)
MONOCYTES NFR BLD AUTO: 8 % (ref 4–12)
NEUTROPHILS # BLD AUTO: 3.3 THOUSANDS/ΜL (ref 1.85–7.62)
NEUTS SEG NFR BLD AUTO: 51 % (ref 43–75)
NRBC BLD AUTO-RTO: 0 /100 WBCS
PLATELET # BLD AUTO: 224 THOUSANDS/UL (ref 130–400)
PMV BLD AUTO: 7.1 FL (ref 8.9–12.7)
POTASSIUM SERPL-SCNC: 3.5 MMOL/L (ref 3.5–5.3)
PROTHROMBIN TIME: 10 SECONDS (ref 9.4–11.7)
RBC # BLD AUTO: 4.17 MILLION/UL (ref 4.2–5.4)
SODIUM SERPL-SCNC: 140 MMOL/L (ref 136–145)
WBC # BLD AUTO: 6.4 THOUSAND/UL (ref 4.8–10.8)

## 2018-04-01 PROCEDURE — 80048 BASIC METABOLIC PNL TOTAL CA: CPT | Performed by: EMERGENCY MEDICINE

## 2018-04-01 PROCEDURE — 85610 PROTHROMBIN TIME: CPT | Performed by: EMERGENCY MEDICINE

## 2018-04-01 PROCEDURE — 96374 THER/PROPH/DIAG INJ IV PUSH: CPT

## 2018-04-01 PROCEDURE — 99285 EMERGENCY DEPT VISIT HI MDM: CPT

## 2018-04-01 PROCEDURE — 87081 CULTURE SCREEN ONLY: CPT | Performed by: FAMILY MEDICINE

## 2018-04-01 PROCEDURE — 36415 COLL VENOUS BLD VENIPUNCTURE: CPT | Performed by: EMERGENCY MEDICINE

## 2018-04-01 PROCEDURE — 85025 COMPLETE CBC W/AUTO DIFF WBC: CPT | Performed by: EMERGENCY MEDICINE

## 2018-04-01 PROCEDURE — 70496 CT ANGIOGRAPHY HEAD: CPT

## 2018-04-01 PROCEDURE — 99219 PR INITIAL OBSERVATION CARE/DAY 50 MINUTES: CPT | Performed by: NURSE PRACTITIONER

## 2018-04-01 PROCEDURE — 96372 THER/PROPH/DIAG INJ SC/IM: CPT

## 2018-04-01 PROCEDURE — 85730 THROMBOPLASTIN TIME PARTIAL: CPT | Performed by: EMERGENCY MEDICINE

## 2018-04-01 PROCEDURE — 70498 CT ANGIOGRAPHY NECK: CPT

## 2018-04-01 PROCEDURE — 72040 X-RAY EXAM NECK SPINE 2-3 VW: CPT

## 2018-04-01 PROCEDURE — 96376 TX/PRO/DX INJ SAME DRUG ADON: CPT

## 2018-04-01 PROCEDURE — 96375 TX/PRO/DX INJ NEW DRUG ADDON: CPT

## 2018-04-01 RX ORDER — HYDRALAZINE HYDROCHLORIDE 20 MG/ML
10 INJECTION INTRAMUSCULAR; INTRAVENOUS ONCE
Status: COMPLETED | OUTPATIENT
Start: 2018-04-01 | End: 2018-04-01

## 2018-04-01 RX ORDER — METOCLOPRAMIDE HYDROCHLORIDE 5 MG/ML
10 INJECTION INTRAMUSCULAR; INTRAVENOUS EVERY 6 HOURS PRN
Status: DISCONTINUED | OUTPATIENT
Start: 2018-04-01 | End: 2018-04-02 | Stop reason: HOSPADM

## 2018-04-01 RX ORDER — CYCLOBENZAPRINE HCL 10 MG
10 TABLET ORAL ONCE
Status: DISCONTINUED | OUTPATIENT
Start: 2018-04-01 | End: 2018-04-01

## 2018-04-01 RX ORDER — DIAZEPAM 2 MG/1
2 TABLET ORAL ONCE
Status: COMPLETED | OUTPATIENT
Start: 2018-04-01 | End: 2018-04-01

## 2018-04-01 RX ORDER — ONDANSETRON 2 MG/ML
4 INJECTION INTRAMUSCULAR; INTRAVENOUS ONCE
Status: COMPLETED | OUTPATIENT
Start: 2018-04-01 | End: 2018-04-01

## 2018-04-01 RX ORDER — MORPHINE SULFATE 4 MG/ML
4 INJECTION, SOLUTION INTRAMUSCULAR; INTRAVENOUS ONCE
Status: COMPLETED | OUTPATIENT
Start: 2018-04-01 | End: 2018-04-01

## 2018-04-01 RX ORDER — ONDANSETRON 2 MG/ML
4 INJECTION INTRAMUSCULAR; INTRAVENOUS EVERY 6 HOURS PRN
Status: DISCONTINUED | OUTPATIENT
Start: 2018-04-01 | End: 2018-04-01

## 2018-04-01 RX ORDER — LEVOTHYROXINE SODIUM 0.12 MG/1
125 TABLET ORAL
Status: DISCONTINUED | OUTPATIENT
Start: 2018-04-02 | End: 2018-04-02 | Stop reason: HOSPADM

## 2018-04-01 RX ORDER — HYDRALAZINE HYDROCHLORIDE 20 MG/ML
10 INJECTION INTRAMUSCULAR; INTRAVENOUS EVERY 6 HOURS PRN
Status: DISCONTINUED | OUTPATIENT
Start: 2018-04-01 | End: 2018-04-02 | Stop reason: HOSPADM

## 2018-04-01 RX ORDER — ACETAMINOPHEN 500 MG
500 TABLET ORAL EVERY 6 HOURS PRN
COMMUNITY
End: 2018-04-03 | Stop reason: ALTCHOICE

## 2018-04-01 RX ORDER — LORATADINE 10 MG/1
10 TABLET ORAL DAILY
Status: DISCONTINUED | OUTPATIENT
Start: 2018-04-02 | End: 2018-04-02 | Stop reason: HOSPADM

## 2018-04-01 RX ORDER — NAPROXEN 500 MG/1
500 TABLET ORAL ONCE
Status: COMPLETED | OUTPATIENT
Start: 2018-04-01 | End: 2018-04-01

## 2018-04-01 RX ADMIN — IOHEXOL 85 ML: 350 INJECTION, SOLUTION INTRAVENOUS at 19:02

## 2018-04-01 RX ADMIN — METOCLOPRAMIDE 10 MG: 5 INJECTION, SOLUTION INTRAMUSCULAR; INTRAVENOUS at 23:07

## 2018-04-01 RX ADMIN — NAPROXEN 500 MG: 500 TABLET ORAL at 16:08

## 2018-04-01 RX ADMIN — MORPHINE SULFATE 4 MG: 4 INJECTION INTRAVENOUS at 17:10

## 2018-04-01 RX ADMIN — ONDANSETRON 4 MG: 2 INJECTION INTRAMUSCULAR; INTRAVENOUS at 18:23

## 2018-04-01 RX ADMIN — DIAZEPAM 2 MG: 2 TABLET ORAL at 16:08

## 2018-04-01 RX ADMIN — HYDROMORPHONE HYDROCHLORIDE 1 MG: 1 INJECTION, SOLUTION INTRAMUSCULAR; INTRAVENOUS; SUBCUTANEOUS at 22:30

## 2018-04-01 RX ADMIN — HYDRALAZINE HYDROCHLORIDE 10 MG: 20 INJECTION INTRAMUSCULAR; INTRAVENOUS at 23:07

## 2018-04-01 RX ADMIN — HYDROMORPHONE HYDROCHLORIDE 0.5 MG: 1 INJECTION, SOLUTION INTRAMUSCULAR; INTRAVENOUS; SUBCUTANEOUS at 18:24

## 2018-04-01 RX ADMIN — HYDROMORPHONE HYDROCHLORIDE 1 MG: 1 INJECTION, SOLUTION INTRAMUSCULAR; INTRAVENOUS; SUBCUTANEOUS at 19:37

## 2018-04-01 NOTE — ED PROVIDER NOTES
History  Chief Complaint   Patient presents with    Neck Injury     pt states she "thinks i gave myself whiplash last night"  c/o stiffness on left side neck  43 y/o female presents with neck pain on the left side that started yesterday suddenly when she was cleaning her car and suffered a muscle spasm  No trauma, denies neck stifness  Has some pain radiates up her head  Denies fevers,chills, nausea,vomiting, numbness, parasthesias, weakness,vision changes or any other symptoms  History provided by:  Patient   used: No        Prior to Admission Medications   Prescriptions Last Dose Informant Patient Reported? Taking?    BIOTIN PO 4/1/2018 at Unknown time Self Yes Yes   Sig: Take 30,000 mcg by mouth every morning   Cetirizine HCl (ZYRTEC ALLERGY PO) 4/1/2018 at Unknown time Self Yes Yes   Sig: Take 20 mg by mouth every morning   levothyroxine 100 mcg tablet 4/1/2018 at Unknown time Self Yes Yes   Sig: Take 125 mcg by mouth every morning     naproxen (NAPROSYN) 500 mg tablet Past Week at Unknown time  Yes Yes   Sig: Take 500 mg by mouth as needed for mild pain   ondansetron (ZOFRAN) 4 mg tablet Past Week at Unknown time Self Yes Yes   Sig: Take 4 mg by mouth every 8 (eight) hours as needed for nausea or vomiting May take 2 tabs if needed      Facility-Administered Medications: None       Past Medical History:   Diagnosis Date    Abnormal ECG Dec 2015    Interventricular conduction relay disorder    Abnormal Pap smear of cervix Jan 2015    See medical records from Dr Cuco Augustine Anesthesia complication     Pt smokes marijuana 2 times/daily to control nausea-S/P gastric sleeve    Anxiety     Arthritis     hands    Disease of thyroid gland     hypo    HPV (human papilloma virus) infection Jan 2015    See medical records from Dr Cuco Augustine HTN (hypertension)     Nausea 12/2015    since gastric sleeve    Prediabetes     Urinary tract infection     chronic issue    Varicella March 1985       Past Surgical History:   Procedure Laterality Date    BARIATRIC SURGERY  Dec 2015    Gastric Sleeve    CERVICAL BIOPSY N/A 3/2/2017    Procedure: BIOPSY LEEP CERVIX;  Surgeon: Donato Aguirre MD;  Location: [facility];  Service:     CERVICAL BIOPSY  W/ LOOP ELECTRODE EXCISION  Mar 2017    See medical records from Dr William Hensley ESOPHAGOGASTRODUODENOSCOPY     [address]  12/2015    sleeve at age 45    TONSILLECTOMY      adenoids-age 35    WISDOM TOOTH EXTRACTION      x4-age 18       Family History   Problem Relation Age of Onset    Diabetes Mother      diet controlled    Diabetes Father      insulin    Other Father      agent orange     I have reviewed and agree with the history as documented  Social History   Substance Use Topics    Smoking status: Former Smoker     Packs/day: 0 25     Years: 10 00     Quit date: 2004    Smokeless tobacco: Never Used    Alcohol use 3 6 oz/week     4 Standard drinks or equivalent per week        Review of Systems   All other systems reviewed and are negative  Physical Exam  ED Triage Vitals [04/01/18 1539]   Temperature Pulse Respirations Blood Pressure SpO2   97 7 °F (36 5 °C) 80 20 (!) 170/104 95 %      Temp Source Heart Rate Source Patient Position - Orthostatic VS BP Location FiO2 (%)   Tympanic Monitor Sitting Right arm --      Pain Score       7           Orthostatic Vital Signs  Vitals:    04/02/18 0132 04/02/18 1222 04/02/18 1316 04/02/18 1450   BP: 160/90 167/90 122/75 138/65   Pulse:  60 64 59   Patient Position - Orthostatic VS: Sitting Lying Lying        Physical Exam   Constitutional: She is oriented to person, place, and time  She appears well-developed and well-nourished  HENT:   Head: Normocephalic and atraumatic  Eyes: EOM are normal  Pupils are equal, round, and reactive to light  Neck: Normal range of motion  Neck supple  Cardiovascular: Normal rate and regular rhythm      Pulmonary/Chest: Effort normal and breath sounds normal    Abdominal: Soft  Bowel sounds are normal    Musculoskeletal: Normal range of motion  Left sided cervical paravertebral tenderness noted, no midline tenderness, ROM of the neck intact and no pain with flexion or extension  Neurological: She is alert and oriented to person, place, and time  She displays normal reflexes  No cranial nerve deficit or sensory deficit  She exhibits normal muscle tone  Coordination normal    Skin: Skin is warm and dry  Psychiatric: She has a normal mood and affect  Nursing note and vitals reviewed        ED Medications  Medications   naproxen (NAPROSYN) tablet 500 mg (500 mg Oral Given 4/1/18 1608)   diazepam (VALIUM) tablet 2 mg (2 mg Oral Given 4/1/18 1608)   morphine (PF) 4 mg/mL injection 4 mg (4 mg Intramuscular Given 4/1/18 1710)   HYDROmorphone (DILAUDID) injection 1 mg (0 5 mg Intravenous Given 4/1/18 1824)   ondansetron (ZOFRAN) injection 4 mg (4 mg Intravenous Given 4/1/18 1823)   iohexol (OMNIPAQUE) 350 MG/ML injection (MULTI-DOSE) 85 mL (85 mL Intravenous Given 4/1/18 1902)   HYDROmorphone (DILAUDID) injection 1 mg (1 mg Intravenous Given 4/1/18 1937)   hydrALAZINE (APRESOLINE) injection 10 mg (10 mg Intravenous Given 4/1/18 2307)   promethazine (PHENERGAN) injection 25 mg (25 mg Intravenous Given 4/2/18 0235)   magnesium sulfate IVPB (premix) SOLN 1 g (0 g Intravenous Stopped 4/2/18 1459)   ibuprofen (MOTRIN) tablet 600 mg (600 mg Oral Given 4/2/18 1655)   diazepam (VALIUM) tablet 5 mg (5 mg Oral Given 4/2/18 1655)       Diagnostic Studies  Results Reviewed     Procedure Component Value Units Date/Time    Protime-INR [[id number]]  (Normal) Collected:  04/01/18 1809    Lab Status:  Final result Specimen:  Blood from Arm, Left Updated:  04/01/18 1845     Protime 10 0 seconds      INR 0 95    APTT [[id number]]  (Normal) Collected:  04/01/18 1809    Lab Status:  Final result Specimen:  Blood from Arm, Left Updated:  04/01/18 1845     PTT 25 seconds Narrative: Therapeutic Heparin Range = 60-90 seconds    Basic metabolic panel [[id number]] Collected:  04/01/18 1809    Lab Status:  Final result Specimen:  Blood from Arm, Left Updated:  04/01/18 1828     Sodium 140 mmol/L      Potassium 3 5 mmol/L      Chloride 102 mmol/L      CO2 32 mmol/L      Anion Gap 6 mmol/L      BUN 8 mg/dL      Creatinine 0 73 mg/dL      Glucose 116 mg/dL      Calcium 8 8 mg/dL      eGFR 102 ml/min/1 73sq m     Narrative:         National Kidney Disease Education Program recommendations are as follows:  GFR calculation is accurate only with a steady state creatinine  Chronic Kidney disease less than 60 ml/min/1 73 sq  meters  Kidney failure less than 15 ml/min/1 73 sq  meters  CBC and differential [[id number]]  (Abnormal) Collected:  04/01/18 1809    Lab Status:  Final result Specimen:  Blood from Arm, Left Updated:  04/01/18 1818     WBC 6 40 Thousand/uL      RBC 4 17 (L) Million/uL      Hemoglobin 13 7 g/dL      Hematocrit 40 4 %      MCV 97 fL      MCH 32 8 (H) pg      MCHC 33 9 g/dL      RDW 15 0 %      MPV 7 1 (L) fL      Platelets 454 Thousands/uL      nRBC 0 /100 WBCs      Neutrophils Relative 51 %      Lymphocytes Relative 37 %      Monocytes Relative 8 %      Eosinophils Relative 4 %      Basophils Relative 1 %      Neutrophils Absolute 3 30 Thousands/µL      Lymphocytes Absolute 2 40 Thousands/µL      Monocytes Absolute 0 50 Thousand/µL      Eosinophils Absolute 0 20 Thousand/µL      Basophils Absolute 0 00 Thousands/µL                  CTA head and neck with and without contrast   Final Result by Brooks Field MD (04/01 1944)         1  No evidence of hemodynamic significant stenosis  2   No evidence of acute intracranial hemorrhage  3   No acute fractures or dislocations identified although limited by noise from large field-of-view     4   If there is persistent or worsening symptomatology or focal neurologic deficit of the cervical spine, consider noncontrast cervical spine MRI which would be better for soft tissue evaluation  Workstation performed: [id number]         XR cervical spine 2 or 3 views   Final Result by Barbara Forrest MD (04/01 1800)   Inadequate visualization of C7 on lateral radiograph  If there is high clinical concern for pathology involving C7, then CT would be recommended  No acute findings within the above limitations  Workstation performed: [id number]                    Procedures  Procedures       Phone Contacts  ED Phone Contact    ED Course  ED Course                                MDM  Number of Diagnoses or Management Options  Intractable headache:   Neck pain:   Diagnosis management comments: Patient was given initially valium and naprosyn and got cervical spine XR  Patient did not improve and worsened with her symptoms so proceeded to give morphine IM and then CTA head, neck with IV pain medication  Patient did not improve and so was admitted to the hospital for worsening headache and neck pain  CTA head,neck was negative  Amount and/or Complexity of Data Reviewed  Clinical lab tests: ordered and reviewed  Tests in the radiology section of CPT®: ordered and reviewed  Tests in the medicine section of CPT®: ordered and reviewed      CritCare Time    Disposition  Final diagnoses:   Intractable headache   Neck pain     Time reflects when diagnosis was documented in both MDM as applicable and the Disposition within this note     Time User Action Codes Description Comment    4/1/2018  8:37 PM Deanna Evangelista [R51] Intractable headache     4/1/2018  8:37 PM Deanna Evangelista [M54 2] Neck pain       ED Disposition     ED Disposition Condition Comment    Admit         Follow-up Information     Follow up With Specialties Details Why Contact Info    Rosario Wilder MD Internal Medicine, Family Medicine Schedule an appointment as soon as possible for a visit in 1 week(s)  [address] [address]  [phone]      Vicki Noa Pisano MD Neurology Follow up Follow-up if headache doesn't improve  [address] [address]  [phone]          Discharge Medication List as of 4/2/2018  5:06 PM      START taking these medications    Details   diazepam (VALIUM) 5 mg tablet Take 1 tablet (5 mg total) by mouth every 12 (twelve) hours for 4 days, Starting Mon 4/2/2018, Until Fri 4/6/2018, Normal      ibuprofen (MOTRIN) 600 mg tablet Take 1 tablet (600 mg total) by mouth every 8 (eight) hours for 4 days, Starting Mon 4/2/2018, Until Fri 4/6/2018, Normal         CONTINUE these medications which have NOT CHANGED    Details   BIOTIN PO Take 30,000 mcg by mouth every morning, Until Discontinued, Historical Med      Cetirizine HCl (ZYRTEC ALLERGY PO) Take 20 mg by mouth every morning, Until Discontinued, Historical Med      levothyroxine 100 mcg tablet Take 125 mcg by mouth every morning  , Historical Med      ondansetron (ZOFRAN) 4 mg tablet Take 4 mg by mouth every 8 (eight) hours as needed for nausea or vomiting May take 2 tabs if needed, Until Discontinued, Historical Med      acetaminophen (TYLENOL) 500 mg tablet Take 500 mg by mouth every 6 (six) hours as needed for mild pain Took 2000 mg at 4 am this morning , Historical Med      PARoxetine (PAXIL) 30 mg tablet Take 30 mg by mouth every morning , Until Discontinued, Historical Med         STOP taking these medications       naproxen (NAPROSYN) 500 mg tablet Comments:   Reason for Stopping:               Outpatient Discharge Orders  Discharge Diet     Activity as tolerated     Call provider for:  severe uncontrolled pain     Call provider for:  persistent nausea or vomiting     Call provider for:  difficulty breathing, headache or visual disturbances     Call provider for:  persistent dizziness or light-headedness         ED Provider  Electronically Signed by           Karen Hernandez DO  04/04/18 5243

## 2018-04-01 NOTE — ED NOTES
Pt returns from [address] [place] [address], ambulatory with coffee in hand       Emliy Yuan RN  04/01/18 0855

## 2018-04-02 VITALS
TEMPERATURE: 98.1 F | HEART RATE: 59 BPM | WEIGHT: 215 LBS | BODY MASS INDEX: 36.7 KG/M2 | HEIGHT: 64 IN | RESPIRATION RATE: 16 BRPM | SYSTOLIC BLOOD PRESSURE: 138 MMHG | DIASTOLIC BLOOD PRESSURE: 65 MMHG | OXYGEN SATURATION: 96 %

## 2018-04-02 PROBLEM — F12.10 MARIJUANA ABUSE: Status: ACTIVE | Noted: 2018-04-02

## 2018-04-02 LAB
T4 FREE SERPL-MCNC: 1.12 NG/DL (ref 0.76–1.46)
TSH SERPL DL<=0.05 MIU/L-ACNC: 4.04 UIU/ML (ref 0.36–3.74)
VIT B12 SERPL-MCNC: 219 PG/ML (ref 100–900)

## 2018-04-02 PROCEDURE — 99244 OFF/OP CNSLTJ NEW/EST MOD 40: CPT | Performed by: PSYCHIATRY & NEUROLOGY

## 2018-04-02 PROCEDURE — 82607 VITAMIN B-12: CPT | Performed by: NURSE PRACTITIONER

## 2018-04-02 PROCEDURE — 99239 HOSP IP/OBS DSCHRG MGMT >30: CPT | Performed by: FAMILY MEDICINE

## 2018-04-02 PROCEDURE — 84443 ASSAY THYROID STIM HORMONE: CPT | Performed by: NURSE PRACTITIONER

## 2018-04-02 PROCEDURE — 84439 ASSAY OF FREE THYROXINE: CPT | Performed by: NURSE PRACTITIONER

## 2018-04-02 RX ORDER — DIAZEPAM 5 MG/1
5 TABLET ORAL EVERY 12 HOURS
Qty: 10 TABLET | Refills: 0 | Status: ON HOLD | OUTPATIENT
Start: 2018-04-02 | End: 2018-04-15

## 2018-04-02 RX ORDER — KETOROLAC TROMETHAMINE 30 MG/ML
15 INJECTION, SOLUTION INTRAMUSCULAR; INTRAVENOUS EVERY 6 HOURS PRN
Status: DISCONTINUED | OUTPATIENT
Start: 2018-04-02 | End: 2018-04-02 | Stop reason: HOSPADM

## 2018-04-02 RX ORDER — BUTALBITAL, ACETAMINOPHEN AND CAFFEINE 50; 325; 40 MG/1; MG/1; MG/1
1 TABLET ORAL EVERY 4 HOURS PRN
Status: DISCONTINUED | OUTPATIENT
Start: 2018-04-02 | End: 2018-04-02 | Stop reason: HOSPADM

## 2018-04-02 RX ORDER — PAROXETINE HYDROCHLORIDE 20 MG/1
30 TABLET, FILM COATED ORAL DAILY
Status: DISCONTINUED | OUTPATIENT
Start: 2018-04-02 | End: 2018-04-02 | Stop reason: HOSPADM

## 2018-04-02 RX ORDER — IBUPROFEN 600 MG/1
600 TABLET ORAL EVERY 8 HOURS SCHEDULED
Qty: 30 TABLET | Refills: 0 | Status: ON HOLD | OUTPATIENT
Start: 2018-04-02 | End: 2018-04-15

## 2018-04-02 RX ORDER — MAGNESIUM SULFATE 1 G/100ML
1 INJECTION INTRAVENOUS ONCE
Status: COMPLETED | OUTPATIENT
Start: 2018-04-02 | End: 2018-04-02

## 2018-04-02 RX ORDER — LEVALBUTEROL INHALATION SOLUTION 0.63 MG/3ML
0.63 SOLUTION RESPIRATORY (INHALATION) EVERY 4 HOURS PRN
Status: DISCONTINUED | OUTPATIENT
Start: 2018-04-02 | End: 2018-04-02

## 2018-04-02 RX ORDER — KETOROLAC TROMETHAMINE 30 MG/ML
15 INJECTION, SOLUTION INTRAMUSCULAR; INTRAVENOUS EVERY 6 HOURS PRN
Status: DISCONTINUED | OUTPATIENT
Start: 2018-04-02 | End: 2018-04-02

## 2018-04-02 RX ORDER — PROMETHAZINE HYDROCHLORIDE 25 MG/ML
25 INJECTION, SOLUTION INTRAMUSCULAR; INTRAVENOUS ONCE
Status: COMPLETED | OUTPATIENT
Start: 2018-04-02 | End: 2018-04-02

## 2018-04-02 RX ORDER — DIAZEPAM 5 MG/1
5 TABLET ORAL ONCE
Status: COMPLETED | OUTPATIENT
Start: 2018-04-02 | End: 2018-04-02

## 2018-04-02 RX ORDER — IBUPROFEN 600 MG/1
600 TABLET ORAL ONCE
Status: COMPLETED | OUTPATIENT
Start: 2018-04-02 | End: 2018-04-02

## 2018-04-02 RX ADMIN — METOCLOPRAMIDE 10 MG: 5 INJECTION, SOLUTION INTRAMUSCULAR; INTRAVENOUS at 12:28

## 2018-04-02 RX ADMIN — HYDROMORPHONE HYDROCHLORIDE 0.5 MG: 1 INJECTION, SOLUTION INTRAMUSCULAR; INTRAVENOUS; SUBCUTANEOUS at 09:59

## 2018-04-02 RX ADMIN — IBUPROFEN 600 MG: 600 TABLET, FILM COATED ORAL at 16:55

## 2018-04-02 RX ADMIN — HYDROMORPHONE HYDROCHLORIDE 0.5 MG: 1 INJECTION, SOLUTION INTRAMUSCULAR; INTRAVENOUS; SUBCUTANEOUS at 14:50

## 2018-04-02 RX ADMIN — MAGNESIUM SULFATE HEPTAHYDRATE 1 G: 1 INJECTION, SOLUTION INTRAVENOUS at 12:27

## 2018-04-02 RX ADMIN — DIAZEPAM 5 MG: 5 TABLET ORAL at 16:55

## 2018-04-02 RX ADMIN — PAROXETINE HYDROCHLORIDE 30 MG: 20 TABLET, FILM COATED ORAL at 12:26

## 2018-04-02 RX ADMIN — PROMETHAZINE HYDROCHLORIDE 25 MG: 25 INJECTION INTRAMUSCULAR; INTRAVENOUS at 02:35

## 2018-04-02 RX ADMIN — LORATADINE 10 MG: 10 TABLET ORAL at 12:27

## 2018-04-02 NOTE — ASSESSMENT & PLAN NOTE
· Lost 85 lbs since the surgery in 12/2015  · Body mass index is 36 9 kg/m²  Diet and lifestyle modification

## 2018-04-02 NOTE — ASSESSMENT & PLAN NOTE
· Pain located in left occipital region with radiation to entire head  + photosensitivity, associated with dizziness, nausea  Pain started after she "pulled a muscle" in neck yesterday while cleaning her car per patient  · X-ray cervical spine unremarkable  CTA head and neck no acute findings  · No focal neuro deficit    · B12 low normal, TSH slightly elevated on Synthroid     · Pain controlled with IV Dilaudid  · Per neurology, OK to discharge on Ibuprofen 600 mg TID and Valium 5 mg BID for 3-4 days   · Improve compliance with Synthroid   · F/u with PCP  · F/u with neurology if headache doesn't improve

## 2018-04-02 NOTE — H&P
H&P- Ирина Massing 1975, 44 y o  female MRN: [id number]    Unit/Bed#: [address] Encounter: [id number]    Primary Care Provider: Eze Prater MD   Date and time admitted to hospital: 4/1/2018  3:38 PM        * Intractable headache   Assessment & Plan    · Pain located in left occipital region with radiation to entire head  + photosensitivity, associated with dizziness, nausea  Pain started after she "pulled a muscle" in neck yesterday while cleaning her car per patient  · X-ray cervical spine unremarkable  CTA head and neck no acute findings  · No focal neuro deficit  · Patient refused IV Toradol, IV Benadryl  Prefers iv dilaudid as she received it in ED  Will continue IV Dilaudid p r n  antiemetic p r n  · Check B12, TSH  · Will consult Neurology  HTN (hypertension)   Assessment & Plan    · History of hypertension before gastric sleeve  -190's since ED arrival   Could be reactive to pain  · IV hydralazine p r n   Monitor  Hypothyroidism   Assessment & Plan    Continue Synthroid  Check TSH, free T4  Marijuana abuse   Assessment & Plan    Check urine drug screen  Counseling provided  Anxiety   Assessment & Plan    Continue Paxil  Status post laparoscopic sleeve gastrectomy   Assessment & Plan    · Lost 85 lbs since the surgery in 12/2015  · Body mass index is 36 9 kg/m²  Diet and lifestyle modification  VTE Prophylaxis: low risk   / sequential compression device   Code Status: Full code  POLST: POLST form is not discussed and not completed at this time  Anticipated Length of Stay:  Patient will be admitted on an Observation basis with an anticipated length of stay of  < 2 midnights  Justification for Hospital Stay:  Intractable headache  Total Time for Visit, including Counseling / Coordination of Care: 45 minutes  Greater than 50% of this total time spent on direct patient counseling and coordination of care      Chief Complaint:   Neck pain and headache  History of Present Illness: Dejuan Winston is a 44 y o  female with PMH of anxiety, hypothyroid, hypertension, marijuana abuse who presents with neck pain and headache  Patient states she pulled a muscle in neck yesterday while she was cleaning her car  She heard a "pop" when that happened  [place] like she gave herself "whiplash"  She states 1 particular spot in back of head on left side is very tender,and the pain shoots up to her entire head,left side is worse than right side  The pain is constant, throbbing/pressure in nature, turning head around and lights aggravates the pain, associated with nausea, dizziness  She took Tylenol at home without relief  She states pain was worse after taking x ray because she turned her head in order to do neck x ray  She denies chest pains, SOB, vomiting, diarrhea constipation  She denies neck stiffness  She denies numbness, tingling, weakness to extremities  She denies double vision, blurry vision, trouble speaking  She reports headache now and then in the past,but no diagnosis of migraine headache in the past     In ED, X-RAY CERVICAL SPINE, CT HEAD AND NECK were unremarkable  Patient received Valium PO, Dilaudid IV x2, morphine IM x1, naproxen PO, Zofran IV in ED  Review of Systems:    Review of Systems   Gastrointestinal: Positive for nausea  Musculoskeletal: Positive for neck pain  Neurological: Positive for dizziness and headaches  All other systems reviewed and are negative        Past Medical and Surgical History:     Past Medical History:   Diagnosis Date    Anesthesia complication     Pt smokes marijuana 2 times/daily to control nausea-S/P gastric sleeve    Anxiety     Arthritis     hands    Disease of thyroid gland     hypo    HTN (hypertension)     Nausea 12/2015    since gastric sleeve    Prediabetes     Urinary tract infection     chronic issue       Past Surgical History:   Procedure Laterality Date  CERVICAL BIOPSY N/A 3/2/2017    Procedure: BIOPSY LEEP CERVIX;  Surgeon: Romana Cohn, MD;  [facility]: [facility];  Service:     ESOPHAGOGASTRODUODENOSCOPY      GASTRIC RESTRICTION SURGERY  12/2015    sleeve at age 45    TONSILLECTOMY      adenoids-age 35    WISDOM TOOTH EXTRACTION      x4-age 18       Meds/Allergies:    Prior to Admission medications    Medication Sig Start Date End Date Taking? Authorizing Provider   acetaminophen (TYLENOL) 500 mg tablet Take 500 mg by mouth every 6 (six) hours as needed for mild pain Took 2000 mg at 4 am this morning  Yes Historical Provider, MD   BIOTIN PO Take 30,000 mcg by mouth every morning   Yes Historical Provider, MD   Cetirizine HCl (ZYRTEC ALLERGY PO) Take 20 mg by mouth every morning   Yes Historical Provider, MD   levothyroxine 100 mcg tablet Take 125 mcg by mouth every morning     Yes Historical Provider, MD   naproxen (NAPROSYN) 500 mg tablet Take 500 mg by mouth as needed for mild pain   Yes Historical Provider, MD   ondansetron (ZOFRAN) 4 mg tablet Take 4 mg by mouth every 8 (eight) hours as needed for nausea or vomiting May take 2 tabs if needed   Yes Historical Provider, MD   PARoxetine (PAXIL) 30 mg tablet Take 30 mg by mouth every morning  Yes Historical Provider, MD     I have reviewed home medications with patient personally  Allergies: Allergies   Allergen Reactions    Chilcoot (Diagnostic) Anaphylaxis     All stone nuts  Lips/tongue swell-Birch Tree allergy    Bee Venom Anaphylaxis     All over swelling/pain/redness    Soy Lecithin [Lecithin] Anaphylaxis     [place]/throat swelling-dyspnea       Social History:     Marital Status: /Civil Union   Occupation:  Unemployed  Patient Pre-hospital Living Situation:  Lives with family  Patient Pre-hospital Level of Mobility:  Independent  Patient Pre-hospital Diet Restrictions:  None    Substance Use History:   History   Alcohol Use    3 6 oz/week    6 Glasses of wine per week     History Smoking Status    Former Smoker    Packs/day: 0 25    Years: 10 00    Quit date: 2000   Smokeless Tobacco    Never Used     History   Drug Use    Types: Marijuana     Comment: last smoked today       Family History:    Family History   Problem Relation Age of Onset    Diabetes Mother      diet controlled    Diabetes Father      insulin    Other Father      agent orange       Physical Exam:     Vitals:   Blood Pressure: 160/90 (04/02/18 0132)  Pulse: 59 (04/01/18 2150)  Temperature: (!) 97 3 °F (36 3 °C) (04/01/18 2150)  Temp Source: Oral (04/01/18 2150)  Respirations: 20 (04/01/18 2150)  Height: 5' 4" (162 6 cm) (04/01/18 1539)  Weight - Scale: 97 5 kg (215 lb) (04/01/18 1539)  SpO2: 94 % (04/01/18 2150)    Physical Exam   Constitutional: She is oriented to person, place, and time  She appears well-developed and well-nourished  HENT:   Head: Normocephalic and atraumatic  Left occipital region tender  Eyes: Conjunctivae and EOM are normal  Pupils are equal, round, and reactive to light  Neck: Normal range of motion  Neck supple  Cardiovascular: Normal rate and regular rhythm  Exam reveals no gallop and no friction rub  No murmur heard  Pulmonary/Chest: Effort normal and breath sounds normal  No respiratory distress  She has no wheezes  She has no rales  Abdominal: Soft  Bowel sounds are normal  She exhibits no distension  There is no tenderness  There is no rebound  Musculoskeletal: Normal range of motion  She exhibits no edema, tenderness or deformity  Neurological: She is alert and oriented to person, place, and time  Skin: Skin is warm and dry  Psychiatric:   Patient appears anxious  Nursing note and vitals reviewed  Additional Data:     Lab Results: I have personally reviewed pertinent reports          Results from last 7 days  Lab Units 04/01/18  1809   WBC Thousand/uL 6 40   HEMOGLOBIN g/dL 13 7   HEMATOCRIT % 40 4   PLATELETS Thousands/uL 224   NEUTROS PCT % 51 LYMPHS PCT % 37   MONOS PCT % 8   EOS PCT % 4       Results from last 7 days  Lab Units 04/01/18  1809   SODIUM mmol/L 140   POTASSIUM mmol/L 3 5   CHLORIDE mmol/L 102   CO2 mmol/L 32   BUN mg/dL 8   CREATININE mg/dL 0 73   CALCIUM mg/dL 8 8   GLUCOSE RANDOM mg/dL 116       Results from last 7 days  Lab Units 04/01/18  1809   INR  0 95       Imaging: I have personally reviewed pertinent reports  Cta Head And Neck With And Without Contrast    Result Date: 4/1/2018  Narrative: CTA NECK AND BRAIN WITH AND WITHOUT CONTRAST INDICATION: Neck pain COMPARISON:   None  TECHNIQUE:  Routine CT imaging of the Brain without contrast   Post contrast imaging was performed after administration of iodinated contrast through the neck and brain  Post contrast axial 0 625 mm images timed to opacify the arterial system  3D rendering was performed on an independent workstation  MIP reconstructions performed  Coronal reconstructions were performed of the noncontrast portion of the brain  Radiation dose length product (DLP) for this visit:  1606 03 mGy-cm   This examination, like all CT scans performed in the [facility], was performed utilizing techniques to minimize radiation dose exposure, including the use of iterative reconstruction and automated exposure control  IV Contrast:  85 mL of iohexol (OMNIPAQUE)  IMAGE QUALITY:   Diagnostic FINDINGS: NONCONTRAST BRAIN PARENCHYMA:  No intracranial mass, mass effect or midline shift  No acute intracranial hemorrhage  No CT signs of acute infarction  VENTRICLES AND EXTRA-AXIAL SPACES:  Normal for patient's age  VISUALIZED ORBITS AND PARANASAL SINUSES:  Left maxillary sinus because retention cyst  CALVARIUM AND EXTRACRANIAL SOFT TISSUES:   Normal  CERVICAL VASCULATURE AORTIC ARCH AND GREAT VESSELS:  Normal aortic arch and great vessel origins  Normal visualized subclavian vessels  RIGHT VERTEBRAL ARTERY CERVICAL SEGMENT:  Normal origin   The vessel is normal in caliber throughout the neck  LEFT VERTEBRAL ARTERY CERVICAL SEGMENT:  Normal origin  The vessel is normal in caliber throughout the neck  RIGHT EXTRACRANIAL CAROTID SEGMENT:  Normal caliber common carotid artery  Normal bifurcation and cervical internal carotid artery  No stenosis or dissection  LEFT EXTRACRANIAL CAROTID SEGMENT:  Normal caliber common carotid artery  Normal bifurcation and cervical internal carotid artery  No stenosis or dissection  NASCET criteria was used to determine the degree of internal carotid artery diameter stenosis  INTRACRANIAL VASCULATURE INTERNAL CAROTID ARTERIES:  Normal enhancement of the intracranial portions of the internal carotid arteries  Normal ophthalmic artery origins  Normal ICA terminus  ANTERIOR CIRCULATION:  Symmetric A1 segments and anterior cerebral arteries with normal enhancement  Normal anterior communicating artery  MIDDLE CEREBRAL ARTERY CIRCULATION:  M1 segment and middle cerebral artery branches demonstrate normal enhancement bilaterally  DISTAL VERTEBRAL ARTERIES:  Normal distal vertebral arteries  Posterior inferior cerebellar artery origins are normal  Normal vertebral basilar junction  BASILAR ARTERY:  Basilar artery is normal in caliber  Normal superior cerebellar arteries  POSTERIOR CEREBRAL ARTERIES: Both posterior cerebral arteries arises from the basilar tip  Both arteries demonstrate normal enhancement  Normal posterior communicating arteries  DURAL VENOUS SINUSES:  Normal  NON VASCULAR ANATOMY BONY STRUCTURES:  No acute osseous abnormality  SOFT TISSUES OF THE NECK: 13 mm left thyroid partially calcified nodule    Incidental discovery of one or more thyroid nodule(s) measuring less than 1 5 cm and without suspicious features is noted in this patient who is above 28years old; according to guidelines published in the February 2015 white paper on incidental thyroid nodules in the Journal of the [employer] of Radiology Shanda Rahman), no further evaluation is recommended  THORACIC INLET:  Unremarkable  Impression: 1  No evidence of hemodynamic significant stenosis  2   No evidence of acute intracranial hemorrhage  3   No acute fractures or dislocations identified although limited by noise from large field-of-view  4   If there is persistent or worsening symptomatology or focal neurologic deficit of the cervical spine, consider noncontrast cervical spine MRI which would be better for soft tissue evaluation  Workstation performed: [id number]     Xr Cervical Spine 2 Or 3 Views    Result Date: 4/1/2018  Narrative: CERVICAL SPINE INDICATION: 41-year-old woman with neck pain  COMPARISON:  None VIEWS:  XR SPINE CERVICAL 2 OR 3 VW INJURY FINDINGS: C7 is not adequately evaluated on lateral view  The odontoid tip is obscured on open-mouth view  No evidence of fracture  Normal alignment without subluxation  The intervertebral disc spaces are preserved  Small anterior osteophyte C4-5 and C5-6  The prevertebral soft tissues are within normal limits  The lung apices are clear  Impression: Inadequate visualization of C7 on lateral radiograph  If there is high clinical concern for pathology involving C7, then CT would be recommended  No acute findings within the above limitations  Workstation performed: [id number]       EKG, Pathology, and Other Studies Reviewed on Admission:   · EKG: n/a  Allscripts Records Reviewed: Yes     ** Please Note: Dragon 360 Dictation voice to text software may have been used in the creation of this document   **

## 2018-04-02 NOTE — DISCHARGE INSTRUCTIONS
Migraine/ Muscle Spasm:   Start Ibuprofen 600 mg three times daily with Valium 5 mg twice daily for next 3-4 days pending progress   Follow-up with your PCP in 1 week         Migraine Headache   WHAT YOU NEED TO KNOW:   What is a migraine headache? A migraine is a severe headache  The pain can be so severe that it interferes with your daily activities  A migraine can last a few hours up to several days  The exact cause of migraines is not known  What can trigger a migraine headache? · Stress, eye strain, oversleeping, or not getting enough sleep    · Hormone changes in women from birth control pills, pregnancy, menopause, or during a monthly period    · Skipping meals, going too long without eating, or not drinking enough liquids    · Certain foods or drinks such as chocolate, hard cheese, red wine, or drinks that contain caffeine    · Foods that contain gluten, nitrates, MSG, or artificial sweeteners    · Sunlight, bright or flashing lights, loud noises, smoke, or strong smells    · Heat, humidity, or changes in the weather  What are the warning signs that a migraine headache is about to start? Warning signs usually start 15 to 60 minutes before the headache:  · Visual changes (auras), such as blurred vision, temporary blind or bright spots, lines, or hallucinations    · Unusual tiredness or frequent yawning    · Tingling in an arm or leg  What are the signs and symptoms of a migraine headache? A migraine headache usually begins as a dull ache around the eye or temple  The pain may get worse with movement  You may also have the following:  · Pain in your head that may increase to the point that you cannot do everyday activities    · Pain on one or both sides of your head    · Throbbing, pulsing, or pounding pain in your head    · Nausea and vomiting    · Sensitivity to light, noise, or smells  How is a migraine headache diagnosed? Your healthcare provider will ask questions about your headaches   Describe the pain and any other symptoms, such as nausea  Tell the provider if you think anything triggered the pain  The provider will also want to know what you ate and drank before the pain started  Tell the provider about any medical conditions you have or that run in your family  Include any recent stressors you have had  You may also need any of the following:  · A neurologic exam  is used to check how your pupils react to light  Your healthcare provider may check your memory, hand grasp, and balance  · CT or MRI pictures  may be taken of your brain  You may be given contrast liquid to help your brain show up better in the pictures  Tell the healthcare provider if you have ever had an allergic reaction to contrast liquid  Do not enter the MRI room with anything metal  Metal can cause serious injury  Tell the healthcare provider if you have any metal in or on your body  How is a migraine headache treated? Migraines cannot be cured  The goal of treatment is to reduce your symptoms  Take medicine as soon as you feel a migraine begin  · Prescription pain medicine  may be given  Do not wait until the pain is severe before you take your medicine  · Migraine medicines  are used to help prevent a migraine or stop it once it starts  · Antinausea medicine  may be given to calm your stomach and to help prevent vomiting  This medicine can also help relieve pain  What can I do to manage my symptoms? · Rest in a dark, quiet room  This will help decrease your pain  Sleep may also help relieve the pain  · Apply ice to decrease pain  Use an ice pack, or put crushed ice in a plastic bag  Cover the ice pack with a towel and place it on your head  Apply ice for 15 to 20 minutes every hour  · Apply heat to decrease pain and muscle spasms  Use a small towel dampened with warm water or a heating pad, or sit in a warm bath  Apply heat on the area for 20 to 30 minutes every 2 hours   You may alternate heat and ice     · Keep a migraine record  Write down when your migraines start and stop  Include your symptoms and what you were doing when a migraine began  Record what you ate or drank for 24 hours before the migraine started  Keep track of what you did to treat your migraine and if it worked  Bring the migraine record with you to visits with your healthcare provider  What can I do to prevent another migraine headache? · Do not smoke  Nicotine and other chemicals in cigarettes and cigars can trigger a migraine or make it worse  Ask your healthcare provider for information if you currently smoke and need help to quit  E-cigarettes or smokeless tobacco still contain nicotine  Talk to your healthcare provider before you use these products  · Do not drink alcohol  Alcohol can trigger a migraine  It can also keep medicines used to treat your migraines from working  · Get regular exercise  Exercise may help prevent migraines  Talk to your healthcare provider about the best exercise plan for you  Try to get at least 30 minutes of exercise on most days  · Manage stress  Stress may trigger a migraine  Learn new ways to relax, such as deep breathing  · Create a sleep schedule  Go to bed and get up at the same times each day  Do not watch television before bed  · Eat regular meals  Include healthy foods such as include fruit, vegetables, whole-grain breads, low-fat dairy products, beans, lean meat, and fish  Do not have food or drinks that trigger your migraines  When should I seek immediate care? · You have a headache that seems different or much worse than your usual migraine headache  · You have a severe headache with a fever or a stiff neck  · You have new problems with speech, vision, balance, or movement  · You feel like you are going to faint, you become confused, or you have a seizure  When should I contact my healthcare provider?    · Your migraines interfere with your daily activities  · Your medicines or treatments stop working  · You have questions or concerns about your condition or care  CARE AGREEMENT:   You have the right to help plan your care  Learn about your health condition and how it may be treated  Discuss treatment options with your caregivers to decide what care you want to receive  You always have the right to refuse treatment  The above information is an  only  It is not intended as medical advice for individual conditions or treatments  Talk to your doctor, nurse or pharmacist before following any medical regimen to see if it is safe and effective for you  © 2017 [address] [facility] Information is for End User's use only and may not be sold, redistributed or otherwise used for commercial purposes  All illustrations and images included in CareNotes® are the copyrighted property of A D A M , Inc  or [employer]  Diazepam (By mouth)   Diazepam (dye-AZ-e-tricia)  Treats anxiety, alcohol withdrawal, muscle spasms, and seizures  Brand Name(s): Valium, diazePAM Intensol   There may be other brand names for this medicine  When This Medicine Should Not Be Used: This medicine is not right for everyone  Do not use it if you had an allergic reaction to diazepam, or if you are pregnant or breastfeeding, or you have narrow-angle glaucoma, myasthenia gravis, sleep apnea, or severe breathing problems  Do not give this medicine to any child 10months of age or younger  How to Use This Medicine:   Liquid, Tablet  · Your doctor will tell you how much medicine to use  Do not use more than directed  · Oral liquid: Measure the oral liquid medicine with a marked measuring spoon, oral syringe, or medicine cup  · This medicine should come with a Medication Guide  Ask your pharmacist for a copy if you do not have one  · Missed dose: Take a dose as soon as you remember   If it is almost time for your next dose, wait until then and take a regular dose  Do not take extra medicine to make up for a missed dose  · Store the medicine in a closed container at room temperature, away from heat, moisture, and direct light  Do not freeze the oral liquid  Drugs and Foods to Avoid:   Ask your doctor or pharmacist before using any other medicine, including over-the-counter medicines, vitamins, and herbal products  · Some medicines can affect how diazepam works  Tell your doctor if you are using any of the following:   ¨ Cimetidine, fluoxetine, fluvoxamine, ketoconazole, omeprazole, phenytoin, or theophylline  ¨ MAO inhibitor  ¨ Medicine to treat depression or mental health problem  ¨ Medicine to treat seizures  ¨ Phenothiazine medicine  · Do not drink alcohol while you are using this medicine  · Tell your doctor if you use anything else that makes you sleepy  Some examples are allergy medicine, narcotic pain medicine, and alcohol  Warnings While Using This Medicine:   · It is not safe to take this medicine during pregnancy  It could harm an unborn baby  Tell your doctor right away if you become pregnant  · Tell your doctor if you have kidney disease, liver disease, glaucoma, lung or breathing problems, or a history of drug or alcohol abuse, depression, mental health problems, or seizures  · This medicine may cause the following problems:  ¨ Respiratory depression (serious breathing problem that can be life-threatening), when used with narcotic pain medicines  · This medicine can increase thoughts of suicide  Tell your doctor right away if you start to feel depressed and have thoughts about hurting yourself  · This medicine may make you dizzy or drowsy  Do not drive or do anything else that could be dangerous until you know how this medicine affects you  · This medicine can be habit-forming  Do not use more than your prescribed dose  Call your doctor if you think your medicine is not working  · Do not stop using this medicine suddenly   Your doctor will need to slowly decrease your dose before you stop it completely  · Your doctor will do lab tests at regular visits to check on the effects of this medicine  Keep all appointments  · Keep all medicine out of the reach of children  Never share your medicine with anyone  Possible Side Effects While Using This Medicine:   Call your doctor right away if you notice any of these side effects:  · Allergic reaction: Itching or hives, swelling in your face or hands, swelling or tingling in your mouth or throat, chest tightness, trouble breathing  · Blue lips, fingernails, or skin  · Confusion, lightheadedness, dizziness, problems with muscle control or coordination  · Extreme drowsiness or weakness, slow heartbeat, trouble breathing  · Seizures or tremors  · Unusual mood or behavior, worsening depression, thoughts about hurting yourself, trouble sleeping  If you notice these less serious side effects, talk with your doctor:   · Blurred vision, changes in vision  · Headache  · Nausea, vomiting, constipation, diarrhea, stomach pain  · Tiredness  If you notice other side effects that you think are caused by this medicine, tell your doctor  Call your doctor for medical advice about side effects  You may report side effects to FDA at 2-464-FDA-6137  © 2017 [address] [employer]  Information is for End User's use only and may not be sold, redistributed or otherwise used for commercial purposes  The above information is an  only  It is not intended as medical advice for individual conditions or treatments  Talk to your doctor, nurse or pharmacist before following any medical regimen to see if it is safe and effective for you  Ibuprofen (By mouth)   Ibuprofen (eye-bue-PROE-fen)  Treats pain and fever  This medicine is an NSAID  Brand Name(s):  Advil, Advil Children's, Advil Liqui-Gels, Advil Migraine, All-Purpose First Aid Kit, Children's Ibuprofen, Children's Motrin, Comfort Pac, Concentrated Motrin Infants' Drops, Equate Ibuprofen Alexis Strength, Genpril, Good Neighbor Cap-Profen, Good Neighbor Ibuprofen Infants', [facility] Children's Ibuprofen, [facility] Ibuprofen   There may be other brand names for this medicine  When This Medicine Should Not Be Used: This medicine is not right for everyone  Do not use if you had an allergic reaction (including asthma) to ibuprofen, aspirin, or another NSAID, or right before or after heart surgery  How to Use This Medicine:   Capsule, Liquid Filled Capsule, Suspension, Tablet, Chewable Tablet  · Your doctor will tell you how much medicine to use  Do not use more than directed  · Prescription ibuprofen should come with a Medication Guide  Ask your pharmacist for the Medication Guide if you do not have one  · Follow the instructions on the medicine label if you are using this medicine without a prescription  · Take this medicine with food or milk if it upsets your stomach  · Oral liquid: Shake well just before using  Measure with a marked measuring spoon, oral syringe, or medicine cup  · Chewable tablet: Chew completely before you swallow it  Then drink some water to make sure you swallow all of the medicine  · For Children: Ask your pharmacist if you are not sure how much medicine to give a child  The dose is usually based on weight, not age  Never give more medicine than directed  · For Adults: Do not take more than 6 pills in 1 day (24 hours) unless your doctor tells you to  · Missed dose: If you take this medicine on a regular basis and miss a dose, take it as soon as you can  If it is almost time for your next dose, wait until then to use the medicine and skip the missed dose  Do not use extra medicine to make up for a missed dose  · Store the medicine in a closed container at room temperature, away from heat, moisture, and direct light  Do not freeze the oral liquid    Drugs and Foods to Avoid:   Ask your doctor or pharmacist before using any other medicine, including over-the-counter medicines, vitamins, and herbal products  · Some foods and medicine can affect how ibuprofen works  Tell your doctor if you are also using lithium, methotrexate, a blood thinner (such as warfarin), a steroid medicine (such as hydrocortisone, prednisolone, prednisone), a diuretic (water pill), or an ACE inhibitor blood pressure medicine  · Do not use any other NSAID medicine unless your doctor says it is okay  Some other NSAIDs are aspirin, diclofenac, naproxen, or celecoxib  · Do not drink alcohol while you are using this medicine  Warnings While Using This Medicine:   · Tell your doctor if you are pregnant or breastfeeding  Do not use this medicine during the later part of pregnancy  · Tell your doctor if you have kidney disease, liver disease, asthma, lupus or a similar connective tissue disease, or a history of ulcers or other digestion problems  Tell your doctor if you smoke or have heart or blood circulation problems, including high blood pressure, heart failure (CHF), or bleeding problems  · This medicine may cause the following problems:  ¨ Bleeding and ulcers in the stomach or intestines  ¨ Higher risk of heart attack or stroke  ¨ Liver damage  ¨ Kidney damage  ¨ Vision problems  · Call your doctor if symptoms get worse, pain lasts more than 10 days, or fever lasts more than 3 days  · This medicine might contain sugar or phenylalanine (aspartame)  · Tell any doctor or dentist who treats you that you are using this medicine  · Keep all medicine out of the reach of children  Never share your medicine with anyone    Possible Side Effects While Using This Medicine:   Call your doctor right away if you notice any of these side effects:  · Allergic reaction: Itching or hives, swelling in your face or hands, swelling or tingling in your mouth or throat, chest tightness, trouble breathing  · Blistering, peeling, or red skin rash  · Change in how much or how often you urinate  · Chest pain that may spread to your arms, jaw, back, or neck, trouble breathing, nausea, unusual sweating, faintness  · Chest pain, trouble breathing, weakness on one side of your body, severe headache, trouble seeing or talking, pain in your lower leg  · Dark urine or pale stools, nausea, vomiting, loss of appetite, stomach pain, yellow skin or eyes  · Fever, neck pain, stiff neck  · Severe stomach pain, vomiting blood, bloody or black, tarry stools  · Swelling in your hands, ankles, or feet, rapid weight gain  · Trouble seeing, blind spots, change in how you see colors  · Unusual bleeding, bruising, or weakness  If you notice these less serious side effects, talk with your doctor:   · Constipation, diarrhea, gas, mild upset stomach  · Dizziness, headache, ringing in the ears  If you notice other side effects that you think are caused by this medicine, tell your doctor  Call your doctor for medical advice about side effects  You may report side effects to FDA at 5-747-FDA-7300  © 2017 [address] Information is for End User's use only and may not be sold, redistributed or otherwise used for commercial purposes  The above information is an  only  It is not intended as medical advice for individual conditions or treatments  Talk to your doctor, nurse or pharmacist before following any medical regimen to see if it is safe and effective for you

## 2018-04-02 NOTE — ASSESSMENT & PLAN NOTE
· History of hypertension before gastric sleeve  -190's since ED arrival   Could be reactive to pain  · IV hydralazine p r n   Monitor

## 2018-04-02 NOTE — CASE MANAGEMENT
Initial Clinical Review    Admission: Date/Time/Statement: 4/1/18 2037    Orders Placed This Encounter   Procedures    Place in Observation (expected length of stay for this patient is less than two midnights)     Standing Status:   Standing     Number of Occurrences:   1     Order Specific Question:   Admitting Physician     Answer:   Nate Rivzi [[id number]]     Order Specific Question:   Level of Care     Answer:   Med Surg [[id number]]         ED: Date/Time/Mode of Arrival:   ED Arrival Information     Expected Arrival Acuity Means of Arrival Escorted By Service Admission Type    - 4/1/2018 15:29 Less Urgent Walk-In Spouse General Medicine Urgent    Arrival Complaint    neck and head pain          Chief Complaint:   Chief Complaint   Patient presents with    Neck Injury     pt states she "thinks i gave myself whiplash last night"  c/o stiffness on left side neck  History of Illness: Freddy Wood is a 44 y o  female with PMH of anxiety, hypothyroid, hypertension, marijuana abuse who presents with neck pain and headache  Patient states she pulled a muscle in neck yesterday while she was cleaning her car  She heard a "pop" when that happened  [place] like she gave herself "whiplash"  She states 1 particular spot in back of head on left side is very tender,and the pain shoots up to her entire head,left side is worse than right side  The pain is constant, throbbing/pressure in nature, turning head around and lights aggravates the pain, associated with nausea, dizziness  She took Tylenol at home without relief  She states pain was worse after taking x ray because she turned her head in order to do neck x ray  She denies chest pains, SOB, vomiting, diarrhea constipation  She denies neck stiffness  She denies numbness, tingling, weakness to extremities  She denies double vision, blurry vision, trouble speaking    She reports headache now and then in the past,but no diagnosis of migraine headache in the past   In ED, X-RAY CERVICAL SPINE, CT HEAD AND NECK were unremarkable  Patient received Valium PO, Dilaudid IV x2, morphine IM x1, naproxen PO, Zofran IV in ED k    ED Vital Signs:   ED Triage Vitals [04/01/18 1539]   Temperature Pulse Respirations Blood Pressure SpO2   97 7 °F (36 5 °C) 80 20 (!) 170/104 95 %      Temp Source Heart Rate Source Patient Position - Orthostatic VS BP Location FiO2 (%)   Tympanic Monitor Sitting Right arm --      Pain Score       7        Wt Readings from Last 1 Encounters:   04/01/18 97 5 kg (215 lb)       Vital Signs (abnormal): Abnormal Labs/Diagnostic Test Results:   xr cervical spine  Inadequate visualization of C7 on lateral radiograph  If there is high clinical concern for pathology involving C7, then CT would be recommended  No acute findings within the above limitations    ct head cervical spine  No evidence of hemodynamic significant stenosis  2   No evidence of acute intracranial hemorrhage  3   No acute fractures or dislocations identified although limited by noise from large field-of-view  4   If there is persistent or worsening symptomatology or focal neurologic deficit of the cervical spine, consider noncontrast cervical spine MRI which would be better for soft tissue evaluation  ED Treatment:   Medication Administration from 04/01/2018 1529 to 04/01/2018 2110       Date/Time Order Dose Route Action Action by Comments     04/01/2018 1608 naproxen (NAPROSYN) tablet 500 mg 500 mg Oral Given Leticia Montero RN      04/01/2018 1608 diazepam (VALIUM) tablet 2 mg 2 mg Oral Given Leticia Meza RN      04/01/2018 1710 morphine (PF) 4 mg/mL injection 4 mg 4 mg Intramuscular Given Galen Herbert RN      04/01/2018 1824 HYDROmorphone (DILAUDID) injection 1 mg 0 5 mg Intravenous Given Leticia Montero RN pt unable to tolerate full dose of medication, 0 5 mg IVP for half dose administered  dr anand notified       04/01/2018 1823 ondansetron (ZOFRAN) injection 4 mg 4 mg Intravenous Given Venu Meeks RN      04/01/2018 1902 iohexol (OMNIPAQUE) 350 MG/ML injection (MULTI-DOSE) 85 mL 85 mL Intravenous Given Ovidio Pate      04/01/2018 1937 HYDROmorphone (DILAUDID) injection 1 mg 1 mg Intravenous Given Blayne Bose RN           Past Medical/Surgical History: Active Ambulatory Problems     Diagnosis Date Noted    DOMO III (cervical intraepithelial neoplasia III) 03/02/2017     Resolved Ambulatory Problems     Diagnosis Date Noted    No Resolved Ambulatory Problems     Past Medical History:   Diagnosis Date    Anesthesia complication     Anxiety     Arthritis     Disease of thyroid gland     HTN (hypertension)     Nausea 12/2015    Prediabetes     Urinary tract infection        Admitting Diagnosis: Neck pain [M54 2]  Intractable headache [R51]    Age/Sex: 44 y o  female    Assessment/Plan:   * Intractable headache   Assessment & Plan     · Pain located in left occipital region with radiation to entire head  + photosensitivity, associated with dizziness, nausea  Pain started after she "pulled a muscle" in neck yesterday while cleaning her car per patient  · X-ray cervical spine unremarkable  CTA head and neck no acute findings  · No focal neuro deficit  · Patient refused IV Toradol, IV Benadryl  Prefers iv dilaudid as she received it in ED  Will continue IV Dilaudid p r n  antiemetic p r n  · Check B12, TSH  · Will consult Neurology           HTN (hypertension)   Assessment & Plan     · History of hypertension before gastric sleeve  -190's since ED arrival   Could be reactive to pain  · IV hydralazine p r n   Monitor           Hypothyroidism   Assessment & Plan     Continue Synthroid  Check TSH, free T4           Marijuana abuse   Assessment & Plan     Check urine drug screen    Counseling provided           Anxiety   Assessment & Plan     Continue Paxil           Status post laparoscopic sleeve gastrectomy   Assessment & Plan     · Lost 85 lbs since the surgery in 12/2015  · Body mass index is 36 9 kg/m²  Diet and lifestyle modification             VTE Prophylaxis: low risk   / sequential compression device   Code Status: Full code  POLST: POLST form is not discussed and not completed at this time  Anticipated Length of Stay:  Patient will be admitted on an Observation basis with an anticipated length of stay of  < 2 midnights  Justification for Hospital Stay:  Intractable headache      Admission Orders:  OBS   CONSULT NEUROLOGY  RAPID DRUG SCREN  Scheduled Meds:   Current Facility-Administered Medications:  hydrALAZINE 10 mg Intravenous Q6H PRN Cuiyin Yurik, CRNP   HYDROmorphone 0 5 mg Intravenous Q3H PRN Cuihennyn Anahirik, CRNP   HYDROmorphone 1 mg Intravenous Q3H PRN Lakiaitone Kearneyk, CRNP   levothyroxine 125 mcg Oral Early Morning Cuitone Negron, CRNP   loratadine 10 mg Oral Daily Cucr Negron, CRNP   metoclopramide 10 mg Intravenous Q6H PRN Lakiaitone Kearneyk, CRNP   PARoxetine 30 mg Oral Daily Cucr Negron, CRNP     Continuous Infusions:    PRN Meds:   hydrALAZINE    HYDROmorphone    HYDROmorphone    metoclopramide

## 2018-04-02 NOTE — ASSESSMENT & PLAN NOTE
TSH elevated at 4 041, free T4 normal  On Synthroid and dose recently increased after December's elevated TSH  Pt reports she's taking Synthroid in the morning with Paxil  She's advised to take Synthroid on an empty stomach without Paxil

## 2018-04-02 NOTE — DISCHARGE SUMMARY
Discharge- Windy Lipoma 1975, 44 y o  female MRN: [id number]    Unit/Bed#: [address] Encounter: [id number]    Primary Care Provider: Ankita Goodrich MD   Date and time admitted to hospital: 4/1/2018  3:38 PM        * Intractable headache   Assessment & Plan    · Pain located in left occipital region with radiation to entire head  + photosensitivity, associated with dizziness, nausea  Pain started after she "pulled a muscle" in neck yesterday while cleaning her car per patient  · X-ray cervical spine unremarkable  CTA head and neck no acute findings  · No focal neuro deficit  · B12 low normal, TSH slightly elevated on Synthroid     · Pain controlled with IV Dilaudid  · Per neurology, OK to discharge on Ibuprofen 600 mg TID and Valium 5 mg BID for 3-4 days   · Improve compliance with Synthroid   · F/u with PCP  · F/u with neurology if headache doesn't improve         Hypothyroidism   Assessment & Plan    TSH elevated at 4 041, free T4 normal  On Synthroid and dose recently increased after December's elevated TSH  Pt reports she's taking Synthroid in the morning with Paxil  She's advised to take Synthroid on an empty stomach without Paxil  HTN (hypertension)   Assessment & Plan    · History of hypertension before gastric sleeve  -190's since ED arrival   Likely reactive to pain  · Improved with pain control and IV magnesium  · Blood pressure check by PCP        Marijuana abuse   Assessment & Plan    Check urine drug not collected  Counseling provided  Status post laparoscopic sleeve gastrectomy   Assessment & Plan    · Lost 85 lbs since the surgery in 12/2015  · Body mass index is 36 9 kg/m²  Diet and lifestyle modification          Anxiety   Assessment & Plan    Continue Paxil                Resolved Problems  Date Reviewed: 4/2/2018    None          Consultations During Hospital Stay:  · Neurology - Dr Deirdre Muro     Procedures Performed:     · Xray cervical spine: Inadequate visualization of C7 on lateral radiograph  If there is high clinical concern for pathology involving C7, then CT would be recommended  No acute findings within the above limitations  · CTA head/ neck: 1  No evidence of hemodynamic significant stenosis  2   No evidence of acute intracranial hemorrhage  3   No acute fractures or dislocations identified although limited by noise from large field-of-view  4   If there is persistent or worsening symptomatology or focal neurologic deficit of the cervical spine, consider noncontrast cervical spine MRI which would be better for soft tissue evaluation  Significant Findings / Test Results:     · TSH slightly elevated, 4 041    Incidental Findings:   · None     Test Results Pending at Discharge (will require follow up): · None     Outpatient Tests Requested:  · TSH as outpatient after follow-up with PCP    Complications:  None    Reason for Admission: Intractable migraine     Hospital Course: Camila Lerner is a 44 y o  female patient with a PMH including anxiety, HTN, hypothyroidism, and marijuana abuse who originally presented to the hospital on 4/1/2018 due to left-sided neck spasm resulting in an intractable headache  Patient reports she was cleaning her car yesterday when she pulled a muscle in her neck  She was admitted for further evaluation and treatment  A Neurology consultation was obtained  Her headache was relieved with IV Magnesium and IV Dilaudid as needed  On day of discharge she is able to ambulate room with ease and declines complaints of lightheadedness, chest pain, shortness of breath, appetite changes, nausea, vomiting, or diarrhea  She will be discharged home on ibuprofen 600 mg TID and Valium 5 mg BID for 3-4 days  She will follow-up with her PCP in 1 week for a blood pressure check  She will follow-up with neurology if her headache doesn't improve  Of note, her TSH is mildly elevated  Her Synthroid was recently increased by her PCP   She was advised to take this on an empty stomach first thing in the morning and repeat a TSH with her PCP  Please see above list of diagnoses and related plan for additional information  Condition at Discharge: stable     Discharge Day Visit / Exam:     Subjective:  Observed patient resting in bed, sleeping but easily aroused  Reports continued left-sided muscle spasm creating a migraine  Pain radiates to her frontal lobe  No vision changes, no lightheadedness, no CP, SOB, N/V/D  Mild dizziness with movement but reports this is normal for her  Migraine controlled with prn dilaudid  Willing to trial Ibuprofen/ Valium for treatment on discharge  Vitals: Blood Pressure: 138/65 (04/02/18 1450)  Pulse: 59 (04/02/18 1450)  Temperature: 98 1 °F (36 7 °C) (04/02/18 1316)  Temp Source: Oral (04/02/18 1316)  Respirations: 16 (04/02/18 1450)  Height: 5' 4" (162 6 cm) (04/01/18 1539)  Weight - Scale: 97 5 kg (215 lb) (04/01/18 1539)  SpO2: 96 % (04/02/18 1316)  Exam:   Physical Exam   Constitutional: She is oriented to person, place, and time  She appears well-developed  No distress  HENT:   Head: Normocephalic  Neck: Normal range of motion  Cardiovascular: Normal rate and regular rhythm  Pulmonary/Chest: Effort normal and breath sounds normal  No respiratory distress  She has no wheezes  She has no rhonchi  She has no rales  Abdominal: Soft  Bowel sounds are normal  She exhibits no distension  There is no tenderness  Musculoskeletal: Normal range of motion  She exhibits no edema or tenderness  Neurological: She is alert and oriented to person, place, and time  Skin: Skin is warm and dry  No rash noted  She is not diaphoretic  Psychiatric: She has a normal mood and affect  Judgment normal    Nursing note and vitals reviewed  Discussion with Family: None    Discharge instructions/Information to patient and family:   See after visit summary for information provided to patient and family        Provisions for Follow-Up Care:  See after visit summary for information related to follow-up care and any pertinent home health orders  Disposition:     Home    For Discharges to [facility] SNF:   · Not Applicable to this Patient - Not Applicable to this Patient    Planned Readmission: None     Discharge Statement:  I spent > 30 minutes discharging the patient  This time was spent on the day of discharge  I had direct contact with the patient on the day of discharge  Greater than 50% of the total time was spent examining patient, answering all patient questions, arranging and discussing plan of care with patient as well as directly providing post-discharge instructions  Additional time then spent on discharge activities  Discharge Medications:  See after visit summary for reconciled discharge medications provided to patient and family        ** Please Note: This note has been constructed using a voice recognition system **

## 2018-04-02 NOTE — PLAN OF CARE
CARDIOVASCULAR - ADULT     Maintains optimal cardiac output and hemodynamic stability Progressing     Absence of cardiac dysrhythmias or at baseline rhythm Progressing        GASTROINTESTINAL - ADULT     Minimal or absence of nausea and/or vomiting Progressing     Maintains or returns to baseline bowel function Progressing     Maintains adequate nutritional intake Progressing        GENITOURINARY - ADULT     Maintains or returns to baseline urinary function Progressing     Absence of urinary retention Progressing        HEMATOLOGIC - ADULT     Maintains hematologic stability Progressing        METABOLIC, FLUID AND ELECTROLYTES - ADULT     Electrolytes maintained within normal limits Progressing     Fluid balance maintained Progressing        MUSCULOSKELETAL - ADULT     Maintain or return mobility to safest level of function Progressing        NEUROSENSORY - ADULT     Achieves stable or improved neurological status Progressing     Achieves maximal functionality and self care Progressing        RESPIRATORY - ADULT     Achieves optimal ventilation and oxygenation Progressing        SKIN/TISSUE INTEGRITY - ADULT     Skin integrity remains intact Progressing     Oral mucous membranes remain intact Progressing

## 2018-04-02 NOTE — ASSESSMENT & PLAN NOTE
· Pain located in left occipital region with radiation to entire head  + photosensitivity, associated with dizziness, nausea  Pain started after she "pulled a muscle" in neck yesterday while cleaning her car per patient  · X-ray cervical spine unremarkable  CTA head and neck no acute findings  · No focal neuro deficit  · Patient refused IV Toradol, IV Benadryl  Prefers iv dilaudid as she received it in ED  Will continue IV Dilaudid p r n  antiemetic p r n  · Check B12, TSH  · Will consult Neurology

## 2018-04-02 NOTE — ASSESSMENT & PLAN NOTE
· History of hypertension before gastric sleeve  -190's since ED arrival   Likely reactive to pain    · Improved with pain control and IV magnesium  · Blood pressure check by PCP

## 2018-04-02 NOTE — CONSULTS
[facility]   Neurology Initial Consult    Misael Landaverde is a 44 y o  female  18 [facility] 204/2 15 Jahaira James obtained from:   Chief Complaint   Patient presents with    Neck Injury     pt states she "thinks i gave myself whiplash last night"  c/o stiffness on left side neck  Assessment/Plan:    1  Muscle strain  2  Intractable headache like cervicogenic  3  H/o migraines    Patient likely had trapezius muscle strain and is now getting headaches  Her pain may be temporary, so will hold off on preventive medication for headache at this time  Discussed options of fioricet, migraine cocktail but she says they were tried in past and she didn't get pain relief  She had adverse effects to tramadol  Discussed use of ibuprofen 600-800mg q8h with food for next 2-3 days for pain relief with valium 5mg bid as muscle relaxant  If pain persists and/or radicular sxs, will consider MRI cervical spine  Encourage hydration  Discussed plan with patient and primary team          HPI:  Misael Landaverde is a 45 yo F with PMH of anxiety disorder, HTN presents with cc of headache  Patient was cleaning her car yesterday and turned a certain way and heard a 'pop'  She thinks she gave herself a whiplash  She suddenly started feeling severe neck pain which later turned into headaches  Patient had CTA head and neck upon arrival and it was negative for acute abnormality  Patient now reports holocephalic sharp, throbbing pain with nausea, dizziness and light sensitivity  She denies associated extremity paresthesia or weakness  Denies facial sensory disturbance, vision or speech disturbance  Patient has h/o migraines but they were rare  Patient states that dilaudid is the only medication that helps with her headache   Her neck muscle pain is now better         Past Medical History:   Diagnosis Date    Anesthesia complication     Pt smokes marijuana 2 times/daily to control nausea-S/P gastric sleeve    Anxiety     Arthritis     hands    Disease of thyroid gland     hypo    HTN (hypertension)     Nausea 12/2015    since gastric sleeve    Prediabetes     Urinary tract infection     chronic issue       Past Surgical History:   Procedure Laterality Date    CERVICAL BIOPSY N/A 3/2/2017    Procedure: BIOPSY LEEP CERVIX;  Surgeon: Mariam Stewart MD;  Location: [address] [place] [address];  Service:     ESOPHAGOGASTRODUODENOSCOPY      GASTRIC RESTRICTION SURGERY  12/2015    sleeve at age 45    TONSILLECTOMY      adenoids-age 35    WISDOM TOOTH EXTRACTION      x4-age 18       Allergies   Allergen Reactions    Plato (Diagnostic) Anaphylaxis     All stone nuts   Lips/tongue swell-Birch Tree allergy    Bee Venom Anaphylaxis     All over swelling/pain/redness    Soy Lecithin [Lecithin] Anaphylaxis     [place]/throat swelling-dyspnea         Current Facility-Administered Medications:     butalbital-acetaminophen-caffeine (FIORICET,ESGIC) -40 mg per tablet 1 tablet, 1 tablet, Oral, Q4H PRN, MARIA DEL ROSARIO Allan    hydrALAZINE (APRESOLINE) injection 10 mg, 10 mg, Intravenous, Q6H PRN, MARIAD EL ROSARIO Billy    HYDROmorphone (DILAUDID) injection 0 5 mg, 0 5 mg, Intravenous, Q4H PRN, MARIA DEL ROSARIO Allan, 0 5 mg at 04/02/18 1450    ketorolac (TORADOL) injection 15 mg, 15 mg, Intravenous, Q6H PRN, MARIA DEL ROSARIO Allan    levothyroxine tablet 125 mcg, 125 mcg, Oral, Early Morning, MARIA DEL ROSARIO Billy    loratadine (CLARITIN) tablet 10 mg, 10 mg, Oral, Daily, MARIA DEL ROSARIO Billy, 10 mg at 04/02/18 1227    metoclopramide (REGLAN) injection 10 mg, 10 mg, Intravenous, Q6H PRN, MARIA DEL ROSARIO Billy, 10 mg at 04/02/18 1228    PARoxetine (PAXIL) tablet 30 mg, 30 mg, Oral, Daily, Chacorta Negron, CRNP, 30 mg at 04/02/18 1226    Current Outpatient Prescriptions:     acetaminophen (TYLENOL) 500 mg tablet, Take 500 mg by mouth every 6 (six) hours as needed for mild pain Took 2000 mg at 4 am this morning , Disp: , Rfl:   BIOTIN PO, Take 30,000 mcg by mouth every morning, Disp: , Rfl:     Cetirizine HCl (ZYRTEC ALLERGY PO), Take 20 mg by mouth every morning, Disp: , Rfl:     levothyroxine 100 mcg tablet, Take 125 mcg by mouth every morning  , Disp: , Rfl:     ondansetron (ZOFRAN) 4 mg tablet, Take 4 mg by mouth every 8 (eight) hours as needed for nausea or vomiting May take 2 tabs if needed, Disp: , Rfl:     PARoxetine (PAXIL) 30 mg tablet, Take 30 mg by mouth every morning , Disp: , Rfl:     diazepam (VALIUM) 5 mg tablet, Take 1 tablet (5 mg total) by mouth every 12 (twelve) hours for 4 days, Disp: 10 tablet, Rfl: 0    ibuprofen (MOTRIN) 600 mg tablet, Take 1 tablet (600 mg total) by mouth every 8 (eight) hours for 4 days, Disp: 30 tablet, Rfl: 0    Social History     Social History    Marital status: /Civil Union     Spouse name: N/A    Number of children: N/A    Years of education: N/A     Occupational History    Not on file  Social History Main Topics    Smoking status: Former Smoker     Packs/day: 0 25     Years: 10 00     Quit date: 2000    Smokeless tobacco: Never Used    Alcohol use 3 6 oz/week     6 Glasses of wine per week    Drug use: Yes     Types: Marijuana      Comment: last smoked today    Sexual activity: Yes     Partners: Male     Other Topics Concern    Not on file     Social History Narrative    No narrative on file       Family History   Problem Relation Age of Onset    Diabetes Mother      diet controlled    Diabetes Father      insulin    Other Father      agent orange         Review of systems:  Please see HPI for positive symptoms  No fever, no chills, no weight change  Headache and neck pain +Ocular: No drainage, no blurred vision  HEENT:  No sore throat, earache, or congestion  No neck pain  COR:  No chest pain  No palpitations  Lungs:  no sob, wheezing,  GI:  no  nausea, no vomiting, no diarrhea, no constipation, no anorexia  :  No dysuria, frequency, or urgency   No hematuria  Musculoskeletal:  No joint pain or swelling or edema  Skin:  No rash or itching  Psychiatric:  no anxiety, no depression  Endocrine:  No polyuria or polydipsia  Physical examination:  Vitals:    04/02/18 1450   BP: 138/65   Pulse: 59   Resp: 16   Temp:    SpO2:        GENERAL APPEARANCE:  The patient is alert, oriented  He is in no acute distress  HEENT:  Head is normocephalic  The sinuses are otherwise nontender  Pupils are equal and reactive  NECK:  Supple without lymphadenopathy  HEART:  Regular rate and rhythm  LUNGS:  clear to auscultation  No crackles or wheezes are heard  ABDOMEN:  Soft, nontender, nondistended with good bowel sounds heard  EXTREMITIES:  Without cyanosis, clubbing or edema  Mental status: The patient is alert, attentive, and oriented  Speech is clear and fluent, good repetition, comprehension, and naming  he recalls 3/3 objects at 5 minutes  Cranial nerves:  CN II: Visual fields are full to confrontation  Fundoscopic exam is normal with sharp discs and no vascular changes    Pupils are 3 mm and reactive to light  CN III, IV, VI: At primary gaze, there is no eye deviation  CN V: Facial sensation is intact to pinprick in all 3 divisions bilaterally  Corneal responses are intact  CN VII: Face is symmetric with normal eye closure and smile  CN VIII: Hearing is normal to rubbing fingers  CN IX, X: Palate elevates symmetrically  Phonation is normal   CN XI: Head turning and shoulder shrug are intact  CN XII: Tongue is midline with normal movements and no atrophy  Motor: There is no pronator drift of out-stretched arms    Muscle bulk and tone are normal      Muscle exam  Arm Right Left Leg Right Left   Deltoid 5/5 5/5 Iliopsoas 5/5 5/5   Biceps 5/5 5/5 Quads 5/5 5/5   Triceps 5/5 5/5 Hamstrings 5/5 5/5   Wrist Extension 5/5 5/5 Ankle Dorsi Flexion 5/5 5/5   Wrist Flexion 5/5 5/5 Ankle Plantar Flexion 5/5 5/5   Interossei 5/5 5/5 Ankle Eversion 5/5 5/5   APB 5/5 5/5 Ankle Inversion 5/5 5/5       Reflexes   RJ BJ TJ KJ AJ Plantars De Leon's   Right 2+ 2+ 2+ 2+ 2+ Downgoing Not present   Left 2+ 2+ 2+ 2+ 2+ Downgoing Not present     Sensory:  Light touch, pinprick, position sense, and vibration sense are intact in fingers and toes  Coordination:  Rapid alternating movements and fine finger movements are intact  There is no dysmetria on finger-to-nose and heel-knee-shin  There are no abnormal or extraneous movements  Romberg negative  Gait/Stance:  Normal heel/toe walking and tandem gait  Lab Results   Component Value Date    WBC 6 40 04/01/2018    HGB 13 7 04/01/2018    HCT 40 4 04/01/2018    MCV 97 04/01/2018     04/01/2018     Lab Results   Component Value Date    HGBA1C 6 0 (H) 01/13/2017     Lab Results   Component Value Date    ALT 11 12/29/2017    AST 13 12/29/2017    ALKPHOS 87 12/29/2017    BILITOT 0 4 12/29/2017     Lab Results   Component Value Date    GLUCOSE 116 04/01/2018    CALCIUM 8 8 04/01/2018     04/01/2018    K 3 5 04/01/2018    CO2 32 04/01/2018     04/01/2018    BUN 8 04/01/2018    CREATININE 0 73 04/01/2018         Radiology          Review of reports and Independent Interpretation of images or specimens:  Cta Head And Neck With And Without Contrast    Result Date: 4/1/2018  1  No evidence of hemodynamic significant stenosis  2   No evidence of acute intracranial hemorrhage  3   No acute fractures or dislocations identified although limited by noise from large field-of-view  4   If there is persistent or worsening symptomatology or focal neurologic deficit of the cervical spine, consider noncontrast cervical spine MRI which would be better for soft tissue evaluation  Workstation performed: [id number]     Xr Cervical Spine 2 Or 3 Views    Result Date: 4/1/2018  Inadequate visualization of C7 on lateral radiograph  If there is high clinical concern for pathology involving C7, then CT would be recommended   No acute findings within the above limitations  Workstation performed: [id number]               Thank you for this consult  Total time of encounter: 60 min   More than 50% of time was spent in counseling and coordination of care of patient  KARLA De La Cruz    [facility]  [address] [place] [address],[address]  Yani Modi

## 2018-04-02 NOTE — SOCIAL WORK
DASH discussion completed  Discussed goals of making sure pt's  needs are met upon discharge, pt's preferences are taken into account, pt understands health condition, medications and symptoms to watch for after returning home and pt is aware of any follow up appointments recommended by hospital physician  PT LIVES INDEPENDENTLY WITH [facility], NO DME OR HHC NEEDS, PT USES [email or web address] FOR RX NEEDS  DCP IS TO HOME WHEN STABLE

## 2018-04-03 ENCOUNTER — OFFICE VISIT (OUTPATIENT)
Dept: OBGYN CLINIC | Facility: CLINIC | Age: 43
End: 2018-04-03
Payer: COMMERCIAL

## 2018-04-03 VITALS
WEIGHT: 226 LBS | DIASTOLIC BLOOD PRESSURE: 94 MMHG | BODY MASS INDEX: 38.58 KG/M2 | SYSTOLIC BLOOD PRESSURE: 132 MMHG | HEIGHT: 64 IN

## 2018-04-03 DIAGNOSIS — R87.610 ATYPICAL SQUAMOUS CELLS OF UNDETERMINED SIGNIFICANCE ON CYTOLOGIC SMEAR OF CERVIX (ASC-US): Primary | ICD-10-CM

## 2018-04-03 DIAGNOSIS — F41.9 ANXIETY: Primary | ICD-10-CM

## 2018-04-03 DIAGNOSIS — Z30.09 COUNSELING FOR BIRTH CONTROL REGARDING INTRAUTERINE DEVICE (IUD): ICD-10-CM

## 2018-04-03 PROBLEM — D06.9 CIN III (CERVICAL INTRAEPITHELIAL NEOPLASIA III): Status: RESOLVED | Noted: 2017-03-02 | Resolved: 2018-04-03

## 2018-04-03 PROBLEM — E87.6 HYPOKALEMIA: Status: ACTIVE | Noted: 2017-01-18

## 2018-04-03 LAB — MRSA NOSE QL CULT: NORMAL

## 2018-04-03 PROCEDURE — 1111F DSCHRG MED/CURRENT MED MERGE: CPT | Performed by: OBSTETRICS & GYNECOLOGY

## 2018-04-03 PROCEDURE — 99213 OFFICE O/P EST LOW 20 MIN: CPT | Performed by: OBSTETRICS & GYNECOLOGY

## 2018-04-03 RX ORDER — PAROXETINE 30 MG/1
30 TABLET, FILM COATED ORAL EVERY MORNING
Qty: 30 TABLET | Refills: 3 | Status: SHIPPED | OUTPATIENT
Start: 2018-04-03 | End: 2018-08-10 | Stop reason: SDUPTHER

## 2018-04-03 NOTE — PROGRESS NOTES
A/P:  81-year-old for IUD consultation  1  Birth control: We have discussed today IUDs  Patient is unsure whether she would like her tubes tied  We have discussed that if she does proceed with surgical management, I do recommend a bilateral salpingectomy  We discussed that studies have Luis Moser that bilateral salpingectomy can decrease the risk of ovarian cancer  Patient would like ovaries removed, however she is aware that ovaries are protective for osteoporosis and of cardiovascular fact  For now she would like Christo De Paz  We have discussed the different types of IUDs  We discussed the different progesterone releasing IUDs  We have reviewed the following:  Mirena IUD containing a total of 52mg of levonorgestrel, and releasing 20 mcg/day and lasting 5 years, the [address] IUD containing a total of 52mg of levonorgestrel and releasing 18 6 mcg/day and lasting 4 years, the [place] IUD containing a total of 19 5 mg of levonorgestrel  and releasing 17 5 mcg of levonorgestrel a day and lasting 5 years, and finally the [place] IUD containing 13 5 mg of levonorgestrel and releasing 14mcg/day and lasting 3 years  We discussed the copper IUD, and the risk for heavier periods  We have discussed a 60% amenorrhea rate and 60% ovulation rate with the Mirena  We have discussed the risks including risk of  expulsion, failure(0 2-0 33%), and perforation(1 in 1000)  The expulsion rate is between 2% and 10% during the first year  Patient is aware that she must still use condoms to help prevent the spread of STDs  She would like the Christo De Paz IUD  Patient will eat a meal prior to her appointment, and take 550mg of naproxen sodium  1 hour prior to her appointment  She will also check with her insurance to make sure it is covered prior to her appointment  2) abnormal Pap smear:  Patient is status post LEEP procedure in March of 2017  She is due for a repeat Pap smear with Co testing      Subjective     Hakan Roldan Lee Mac is a 44 y o  G0 with her LMP 2/19/18 not currently on any contraception who presents for a 2nd opinion  Patient was seen Dr Felisha Zuñiga and had a abnormal Pap smear in 2016  A colposcopy showed DOMO 3  She then had a LEEP done in March of 2017 which which was DOMO 1  Patient then desired an IUD, and secondary to recurrent vaginal infections, a IUD was postponed  She does have a history of Trichomonas  Her most recent vaginal swab in December was negative  Her mother went through menopause around the age of 39  Patient is unsure at this point whether she would like an IUD or she would like her tubes tied  She has 100% positive that she does not want any children  She has a history of an ovarian cyst x1 which did not require surgical management  She has a history of gastric bypass  Patient Active Problem List   Diagnosis    DOMO III (cervical intraepithelial neoplasia III)    Intractable headache    Anxiety    HTN (hypertension)    Status post laparoscopic sleeve gastrectomy    Hypothyroidism    Marijuana abuse    Hyperlipemia, mixed    Hypokalemia    Incomplete RBBB    Psoriasis    Sleep apnea    Vitamin D deficiency       Gynecologic History  Patient's last menstrual period was 02/19/2018 (exact date)  Contraception: none    The following portions of the patient's history were reviewed and updated as appropriate: allergies, current medications, past family history, past medical history, past social history and past surgical history  Review of Systems  Pertinent items are noted in HPI  Objective    /94   Ht 5' 4" (1 626 m)   Wt 103 kg (226 lb)   LMP 02/19/2018 (Exact Date)   Breastfeeding? No   BMI 38 79 kg/m²    GEN: The patient was alert and oriented x3, pleasant well-appearing female in no acute distress     CHEST: no wheezes, no crackle, no rhales  CV: RRR, no murmurs  Abd: soft, NT, +BS

## 2018-04-14 ENCOUNTER — APPOINTMENT (EMERGENCY)
Dept: RADIOLOGY | Facility: HOSPITAL | Age: 43
End: 2018-04-14
Payer: COMMERCIAL

## 2018-04-14 ENCOUNTER — HOSPITAL ENCOUNTER (OUTPATIENT)
Facility: HOSPITAL | Age: 43
Setting detail: OBSERVATION
Discharge: HOME | End: 2018-04-15
Attending: EMERGENCY MEDICINE | Admitting: INTERNAL MEDICINE
Payer: COMMERCIAL

## 2018-04-14 DIAGNOSIS — G43.909 MIGRAINE: ICD-10-CM

## 2018-04-14 DIAGNOSIS — R51.9 INTRACTABLE HEADACHE: Primary | ICD-10-CM

## 2018-04-14 DIAGNOSIS — R11.10 INTRACTABLE VOMITING: ICD-10-CM

## 2018-04-14 DIAGNOSIS — G43.919 INTRACTABLE MIGRAINE: ICD-10-CM

## 2018-04-14 PROBLEM — R11.2 INTRACTABLE VOMITING WITH NAUSEA: Status: ACTIVE | Noted: 2018-04-14

## 2018-04-14 PROBLEM — I16.0 HYPERTENSIVE URGENCY: Status: ACTIVE | Noted: 2018-04-14

## 2018-04-14 LAB
ALBUMIN SERPL BCP-MCNC: 3.6 G/DL (ref 3.5–5)
ALP SERPL-CCNC: 89 U/L (ref 46–116)
ALT SERPL W P-5'-P-CCNC: 37 U/L (ref 12–78)
ANION GAP SERPL CALCULATED.3IONS-SCNC: 11 MMOL/L (ref 4–13)
AST SERPL W P-5'-P-CCNC: 25 U/L (ref 5–45)
BASOPHILS # BLD AUTO: 0 THOUSANDS/ΜL (ref 0–0.1)
BASOPHILS NFR BLD AUTO: 0 % (ref 0–1)
BILIRUB SERPL-MCNC: 0.3 MG/DL (ref 0.2–1)
BUN SERPL-MCNC: 6 MG/DL (ref 5–25)
CALCIUM SERPL-MCNC: 7.9 MG/DL
CHLORIDE SERPL-SCNC: 101 MMOL/L (ref 100–108)
CO2 SERPL-SCNC: 26 MMOL/L (ref 21–32)
CREAT SERPL-MCNC: 0.79 MG/DL (ref 0.6–1.3)
CRP SERPL QL: 4.2 MG/L
EOSINOPHIL # BLD AUTO: 0 THOUSAND/ΜL (ref 0–0.61)
EOSINOPHIL NFR BLD AUTO: 0 % (ref 0–6)
ERYTHROCYTE [DISTWIDTH] IN BLOOD BY AUTOMATED COUNT: 14.4 % (ref 11.6–15.1)
ERYTHROCYTE [SEDIMENTATION RATE] IN BLOOD: 67 MM/HOUR (ref 2–25)
GFR SERPL CREATININE-BSD FRML MDRD: 93 ML/MIN/1.73SQ M
GLUCOSE P FAST SERPL-MCNC: 177 MG/DL (ref 65–99)
GLUCOSE SERPL-MCNC: 177 MG/DL (ref 65–140)
HCT VFR BLD AUTO: 41.3 % (ref 37–47)
HGB BLD-MCNC: 13.9 G/DL (ref 12–16)
INR PPP: 0.98 (ref 0.86–1.16)
LYMPHOCYTES # BLD AUTO: 0.9 THOUSANDS/ΜL (ref 0.6–4.47)
LYMPHOCYTES NFR BLD AUTO: 9 % (ref 14–44)
MAGNESIUM SERPL-MCNC: 2.1 MG/DL (ref 1.6–2.6)
MCH RBC QN AUTO: 32.8 PG (ref 27–31)
MCHC RBC AUTO-ENTMCNC: 33.8 G/DL (ref 31.4–37.4)
MCV RBC AUTO: 97 FL (ref 82–98)
MONOCYTES # BLD AUTO: 0.2 THOUSAND/ΜL (ref 0.17–1.22)
MONOCYTES NFR BLD AUTO: 2 % (ref 4–12)
NEUTROPHILS # BLD AUTO: 9.2 THOUSANDS/ΜL (ref 1.85–7.62)
NEUTS SEG NFR BLD AUTO: 89 % (ref 43–75)
NRBC BLD AUTO-RTO: 0 /100 WBCS
PHOSPHATE SERPL-MCNC: 2.5 MG/DL (ref 2.7–4.5)
PLATELET # BLD AUTO: 248 THOUSANDS/UL (ref 130–400)
PLATELET BLD QL SMEAR: ADEQUATE
PMV BLD AUTO: 7.1 FL (ref 8.9–12.7)
POTASSIUM SERPL-SCNC: 3.6 MMOL/L (ref 3.5–5.3)
PROT SERPL-MCNC: 7.4 G/DL (ref 6.4–8.2)
PROTHROMBIN TIME: 10.3 SECONDS (ref 9.4–11.7)
RBC # BLD AUTO: 4.25 MILLION/UL (ref 4.2–5.4)
SODIUM SERPL-SCNC: 138 MMOL/L (ref 136–145)
TSH SERPL DL<=0.05 MIU/L-ACNC: 3.96 UIU/ML (ref 0.36–3.74)
WBC # BLD AUTO: 10.4 THOUSAND/UL (ref 4.8–10.8)

## 2018-04-14 PROCEDURE — 84100 ASSAY OF PHOSPHORUS: CPT | Performed by: INTERNAL MEDICINE

## 2018-04-14 PROCEDURE — 96366 THER/PROPH/DIAG IV INF ADDON: CPT

## 2018-04-14 PROCEDURE — 84443 ASSAY THYROID STIM HORMONE: CPT | Performed by: NURSE PRACTITIONER

## 2018-04-14 PROCEDURE — 87081 CULTURE SCREEN ONLY: CPT | Performed by: NURSE PRACTITIONER

## 2018-04-14 PROCEDURE — 85610 PROTHROMBIN TIME: CPT | Performed by: INTERNAL MEDICINE

## 2018-04-14 PROCEDURE — 96372 THER/PROPH/DIAG INJ SC/IM: CPT

## 2018-04-14 PROCEDURE — 96376 TX/PRO/DX INJ SAME DRUG ADON: CPT

## 2018-04-14 PROCEDURE — 96365 THER/PROPH/DIAG IV INF INIT: CPT

## 2018-04-14 PROCEDURE — 80053 COMPREHEN METABOLIC PANEL: CPT | Performed by: INTERNAL MEDICINE

## 2018-04-14 PROCEDURE — 85025 COMPLETE CBC W/AUTO DIFF WBC: CPT | Performed by: INTERNAL MEDICINE

## 2018-04-14 PROCEDURE — 70450 CT HEAD/BRAIN W/O DYE: CPT

## 2018-04-14 PROCEDURE — 96375 TX/PRO/DX INJ NEW DRUG ADDON: CPT

## 2018-04-14 PROCEDURE — 99220 PR INITIAL OBSERVATION CARE/DAY 70 MINUTES: CPT | Performed by: FAMILY MEDICINE

## 2018-04-14 PROCEDURE — 99285 EMERGENCY DEPT VISIT HI MDM: CPT

## 2018-04-14 PROCEDURE — 83735 ASSAY OF MAGNESIUM: CPT | Performed by: INTERNAL MEDICINE

## 2018-04-14 PROCEDURE — 86140 C-REACTIVE PROTEIN: CPT | Performed by: INTERNAL MEDICINE

## 2018-04-14 PROCEDURE — 85652 RBC SED RATE AUTOMATED: CPT | Performed by: INTERNAL MEDICINE

## 2018-04-14 RX ORDER — PAROXETINE HYDROCHLORIDE 20 MG/1
30 TABLET, FILM COATED ORAL EVERY MORNING
Status: DISCONTINUED | OUTPATIENT
Start: 2018-04-14 | End: 2018-04-15 | Stop reason: HOSPADM

## 2018-04-14 RX ORDER — KETOROLAC TROMETHAMINE 30 MG/ML
30 INJECTION, SOLUTION INTRAMUSCULAR; INTRAVENOUS EVERY 12 HOURS
Status: DISCONTINUED | OUTPATIENT
Start: 2018-04-14 | End: 2018-04-14

## 2018-04-14 RX ORDER — METOCLOPRAMIDE HYDROCHLORIDE 5 MG/ML
10 INJECTION INTRAMUSCULAR; INTRAVENOUS ONCE
Status: COMPLETED | OUTPATIENT
Start: 2018-04-14 | End: 2018-04-14

## 2018-04-14 RX ORDER — SODIUM CHLORIDE 9 MG/ML
50 INJECTION, SOLUTION INTRAVENOUS CONTINUOUS
Status: DISCONTINUED | OUTPATIENT
Start: 2018-04-14 | End: 2018-04-15

## 2018-04-14 RX ORDER — ONDANSETRON 2 MG/ML
4 INJECTION INTRAMUSCULAR; INTRAVENOUS ONCE
Status: COMPLETED | OUTPATIENT
Start: 2018-04-14 | End: 2018-04-14

## 2018-04-14 RX ORDER — ONDANSETRON 2 MG/ML
4 INJECTION INTRAMUSCULAR; INTRAVENOUS EVERY 6 HOURS PRN
Status: DISCONTINUED | OUTPATIENT
Start: 2018-04-14 | End: 2018-04-15 | Stop reason: HOSPADM

## 2018-04-14 RX ORDER — METOCLOPRAMIDE HYDROCHLORIDE 5 MG/ML
10 INJECTION INTRAMUSCULAR; INTRAVENOUS EVERY 8 HOURS
Status: DISCONTINUED | OUTPATIENT
Start: 2018-04-14 | End: 2018-04-15 | Stop reason: HOSPADM

## 2018-04-14 RX ORDER — KETOROLAC TROMETHAMINE 30 MG/ML
30 INJECTION, SOLUTION INTRAMUSCULAR; INTRAVENOUS ONCE
Status: COMPLETED | OUTPATIENT
Start: 2018-04-14 | End: 2018-04-14

## 2018-04-14 RX ORDER — PROMETHAZINE HYDROCHLORIDE 25 MG/ML
25 INJECTION, SOLUTION INTRAMUSCULAR; INTRAVENOUS ONCE
Status: COMPLETED | OUTPATIENT
Start: 2018-04-14 | End: 2018-04-14

## 2018-04-14 RX ORDER — DIPHENHYDRAMINE HCL 25 MG
25 TABLET ORAL EVERY 8 HOURS PRN
Status: DISCONTINUED | OUTPATIENT
Start: 2018-04-14 | End: 2018-04-14

## 2018-04-14 RX ORDER — FAMOTIDINE 20 MG/1
20 TABLET, FILM COATED ORAL DAILY
Status: DISCONTINUED | OUTPATIENT
Start: 2018-04-14 | End: 2018-04-15 | Stop reason: HOSPADM

## 2018-04-14 RX ORDER — LEVOTHYROXINE SODIUM 0.12 MG/1
125 TABLET ORAL
Status: DISCONTINUED | OUTPATIENT
Start: 2018-04-14 | End: 2018-04-15 | Stop reason: HOSPADM

## 2018-04-14 RX ORDER — ACETAMINOPHEN 325 MG/1
650 TABLET ORAL EVERY 6 HOURS PRN
Status: DISCONTINUED | OUTPATIENT
Start: 2018-04-14 | End: 2018-04-15 | Stop reason: HOSPADM

## 2018-04-14 RX ORDER — DIAZEPAM 5 MG/1
5 TABLET ORAL DAILY
Status: DISCONTINUED | OUTPATIENT
Start: 2018-04-14 | End: 2018-04-15 | Stop reason: HOSPADM

## 2018-04-14 RX ORDER — DIPHENHYDRAMINE HYDROCHLORIDE 50 MG/ML
12.5 INJECTION INTRAMUSCULAR; INTRAVENOUS EVERY 8 HOURS
Status: DISCONTINUED | OUTPATIENT
Start: 2018-04-14 | End: 2018-04-15 | Stop reason: HOSPADM

## 2018-04-14 RX ORDER — SUMATRIPTAN 6 MG/.5ML
6 INJECTION, SOLUTION SUBCUTANEOUS ONCE
Status: COMPLETED | OUTPATIENT
Start: 2018-04-14 | End: 2018-04-14

## 2018-04-14 RX ORDER — DIPHENHYDRAMINE HYDROCHLORIDE 50 MG/ML
25 INJECTION INTRAMUSCULAR; INTRAVENOUS ONCE
Status: COMPLETED | OUTPATIENT
Start: 2018-04-14 | End: 2018-04-14

## 2018-04-14 RX ORDER — KETOROLAC TROMETHAMINE 30 MG/ML
30 INJECTION, SOLUTION INTRAMUSCULAR; INTRAVENOUS EVERY 8 HOURS
Status: DISCONTINUED | OUTPATIENT
Start: 2018-04-14 | End: 2018-04-15 | Stop reason: HOSPADM

## 2018-04-14 RX ORDER — MAGNESIUM SULFATE HEPTAHYDRATE 40 MG/ML
2 INJECTION, SOLUTION INTRAVENOUS ONCE
Status: COMPLETED | OUTPATIENT
Start: 2018-04-14 | End: 2018-04-14

## 2018-04-14 RX ORDER — DIPHENHYDRAMINE HYDROCHLORIDE 50 MG/ML
25 INJECTION INTRAMUSCULAR; INTRAVENOUS EVERY 8 HOURS PRN
Status: DISCONTINUED | OUTPATIENT
Start: 2018-04-14 | End: 2018-04-15 | Stop reason: HOSPADM

## 2018-04-14 RX ADMIN — PROMETHAZINE HYDROCHLORIDE 25 MG: 25 INJECTION INTRAMUSCULAR; INTRAVENOUS at 04:13

## 2018-04-14 RX ADMIN — METOCLOPRAMIDE 10 MG: 5 INJECTION, SOLUTION INTRAMUSCULAR; INTRAVENOUS at 03:24

## 2018-04-14 RX ADMIN — METOCLOPRAMIDE 10 MG: 5 INJECTION, SOLUTION INTRAMUSCULAR; INTRAVENOUS at 10:10

## 2018-04-14 RX ADMIN — MAGNESIUM SULFATE HEPTAHYDRATE 2 G: 40 INJECTION, SOLUTION INTRAVENOUS at 03:24

## 2018-04-14 RX ADMIN — DIPHENHYDRAMINE HYDROCHLORIDE 25 MG: 50 INJECTION, SOLUTION INTRAMUSCULAR; INTRAVENOUS at 03:24

## 2018-04-14 RX ADMIN — KETOROLAC TROMETHAMINE 30 MG: 30 INJECTION, SOLUTION INTRAMUSCULAR at 10:10

## 2018-04-14 RX ADMIN — SODIUM CHLORIDE 1000 ML: 0.9 INJECTION, SOLUTION INTRAVENOUS at 05:58

## 2018-04-14 RX ADMIN — DIBASIC SODIUM PHOSPHATE, MONOBASIC POTASSIUM PHOSPHATE AND MONOBASIC SODIUM PHOSPHATE 2 TABLET: 852; 155; 130 TABLET ORAL at 11:06

## 2018-04-14 RX ADMIN — SODIUM CHLORIDE 50 ML/HR: 0.9 INJECTION, SOLUTION INTRAVENOUS at 13:08

## 2018-04-14 RX ADMIN — METOCLOPRAMIDE 10 MG: 5 INJECTION, SOLUTION INTRAMUSCULAR; INTRAVENOUS at 17:04

## 2018-04-14 RX ADMIN — SODIUM CHLORIDE 1000 ML: 0.9 INJECTION, SOLUTION INTRAVENOUS at 03:24

## 2018-04-14 RX ADMIN — SUMATRIPTAN 6 MG: 6 INJECTION, SOLUTION SUBCUTANEOUS at 04:14

## 2018-04-14 RX ADMIN — DIPHENHYDRAMINE HYDROCHLORIDE 12.5 MG: 50 INJECTION, SOLUTION INTRAMUSCULAR; INTRAVENOUS at 17:06

## 2018-04-14 RX ADMIN — ONDANSETRON 4 MG: 2 INJECTION INTRAMUSCULAR; INTRAVENOUS at 03:23

## 2018-04-14 RX ADMIN — KETOROLAC TROMETHAMINE 30 MG: 30 INJECTION, SOLUTION INTRAMUSCULAR at 17:45

## 2018-04-14 RX ADMIN — DIAZEPAM 5 MG: 5 TABLET ORAL at 10:09

## 2018-04-14 RX ADMIN — DIBASIC SODIUM PHOSPHATE, MONOBASIC POTASSIUM PHOSPHATE AND MONOBASIC SODIUM PHOSPHATE 2 TABLET: 852; 155; 130 TABLET ORAL at 15:14

## 2018-04-14 RX ADMIN — ACETAMINOPHEN 650 MG: 325 TABLET, FILM COATED ORAL at 15:14

## 2018-04-14 RX ADMIN — PAROXETINE HYDROCHLORIDE 30 MG: 20 TABLET, FILM COATED ORAL at 11:05

## 2018-04-14 RX ADMIN — ONDANSETRON 4 MG: 2 INJECTION INTRAMUSCULAR; INTRAVENOUS at 12:06

## 2018-04-14 RX ADMIN — KETOROLAC TROMETHAMINE 30 MG: 30 INJECTION, SOLUTION INTRAMUSCULAR at 03:24

## 2018-04-14 RX ADMIN — LEVOTHYROXINE SODIUM 125 MCG: 125 TABLET ORAL at 11:05

## 2018-04-14 RX ADMIN — ONDANSETRON 4 MG: 2 INJECTION INTRAMUSCULAR; INTRAVENOUS at 05:58

## 2018-04-14 NOTE — ASSESSMENT & PLAN NOTE
Pain located in right frontal region  + photosensitivity, associated with nausea and vomiting  Recently discharged for intractable headache 12 days ago with Rx for Valium and Ibuprofen and recommendation to f/u with neurology if no improvement       -Migraine cocktail with: Benadry, Reglan, and Toradol IV Q 8hrs  -Valium 5 mg po daily  -S/p Mag Sulfate 2 gm IV x1  -Consult neurology  -IVF

## 2018-04-14 NOTE — H&P
H&P- Abimael Heaton 1975, 44 y o  female MRN: [id number]    Unit/Bed#: [address] [address] Encounter: [id number]    Primary Care Provider: Ava Bernard MD   Date and time admitted to hospital: 4/14/2018  3:07 AM        Intractable migraine   Assessment & Plan    Pain located in right frontal region  + photosensitivity, associated with nausea and vomiting  Recently discharged for intractable headache 12 days ago with Rx for Valium and Ibuprofen and recommendation to f/u with neurology if no improvement  -Migraine cocktail with: Benadry, Reglan, and Toradol IV Q 8hrs  -Valium 5 mg po daily  -S/p Mag Sulfate 2 gm IV x1  -Consult neurology  -IVF        Intractable vomiting with nausea   Assessment & Plan    Likely due to intractable migraine  ? Cyclic vomiting from daily marijuana use    -Continued with Zofran and Reglan IV   -Diet as tolerated  -IVF until appetite improves         Hypertensive urgency   Assessment & Plan    /120, likely exacerbated by intractable vomiting and migraine     -BP improved with control of nausea, vomiting and migraine  -Continue Migraine cocktail and Zofran IV  -Monitor         Hypothyroidism   Assessment & Plan    TSH 3 995, improved from prior level  Pt was not taking Synthroid on an empty stomach until recommendations from previous discharge 12 days ago  -Follow-up TSH 4-6 weeks with PCP        Marijuana abuse   Assessment & Plan    Pt states marijuana use 2-3x/daily for years as it helps with nausea and appetite     -Monitor         Anxiety   Assessment & Plan    -Continue with Paxil daily           VTE Prophylaxis: Ambulatory on SCDs  / sequential compression device   Code Status: Full Code - level 1   POLST: POLST form is not discussed and not completed at this time  Discussion with family:  at bedside    Anticipated Length of Stay:  Patient will be admitted on an Observation basis with an anticipated length of stay of  Less than 2 midnights     Justification for Hospital Stay: Intractable migraine, vomiting, htn urgency     Total Time for Visit, including Counseling / Coordination of Care: 1 hour  Greater than 50% of this total time spent on direct patient counseling and coordination of care  Chief Complaint:   Intractable vomiting and migraine     History of Present Illness: Freddy Wood is a 44 y o  female with a PMH anxiety, hypothyroidism, HTN, daily marijuana abuse, and headache who presents with intractable vomiting and migraine  Patient noted photophobia and a bright 'light' in the periphery of her left eye followed by an intractable headache when she awoke yesterday morning  She took Motrin 600 mg po x1 which eased her headache temporarily, but then headache worsened throughout the day  Patient later developed nausea and vomiting, stating she vomiting 15-20 times  She could not stop vomiting and her migraine worsened, therefore, she sought medical attention in [facility] ED  Patient describes her pain as constant, throbbing, localized to the right frontal region, 10/10  Her pain was alleviated in the ED with Migraine cocktail  Her nausea subsided with IV Reglan, Zofran, and Phenergan  She denies double vision or blurry vision  Review of Systems:    Review of Systems   Constitutional: Positive for appetite change and chills  Negative for fever  HENT: Negative for congestion, postnasal drip, rhinorrhea and sore throat  Eyes: Positive for photophobia  Negative for visual disturbance  Respiratory: Negative for cough, chest tightness, shortness of breath and wheezing  Cardiovascular: Negative for chest pain, palpitations and leg swelling  Gastrointestinal: Positive for nausea and vomiting  Negative for abdominal distention, abdominal pain, constipation and diarrhea  Genitourinary: Negative for difficulty urinating, dysuria and hematuria  Musculoskeletal: Negative for arthralgias, gait problem and myalgias  Skin: Negative for rash and wound  Neurological: Positive for weakness and headaches  Negative for dizziness, light-headedness and numbness  Psychiatric/Behavioral: Negative for confusion  The patient is not nervous/anxious  Past Medical and Surgical History:     Past Medical History:   Diagnosis Date    Abnormal ECG Dec 2015    Interventricular conduction relay disorder    Abnormal Pap smear of cervix Jan 2015    See medical records from Dr Amanda Taveras Anesthesia complication     Pt smokes marijuana 2 times/daily to control nausea-S/P gastric sleeve    Anxiety     Arthritis     hands    Disease of thyroid gland     hypo    HPV (human papilloma virus) infection Jan 2015    See medical records from Dr Amanda Taveras HTN (hypertension)     Nausea 12/2015    since gastric sleeve    Prediabetes     Urinary tract infection     chronic issue    Varicella March 1985       Past Surgical History:   Procedure Laterality Date    BARIATRIC SURGERY  Dec 2015    Gastric Sleeve    CERVICAL BIOPSY N/A 3/2/2017    Procedure: BIOPSY LEEP CERVIX;  Surgeon: Leticia Epstein MD;  Location: [address] [place] [address];  Service:    Yumi Liu CERVICAL BIOPSY  W/ LOOP ELECTRODE EXCISION  Mar 2017    See medical records from Dr Amanda Taveras ESOPHAGOGASTRODUODENOSCOPY     [address]  12/2015    sleeve at age 45    TONSILLECTOMY      adenoids-age 35    WISDOM TOOTH EXTRACTION      x4-age 18       Meds/Allergies:    Prior to Admission medications    Medication Sig Start Date End Date Taking?  Authorizing Provider   BIOTIN PO Take 30,000 mcg by mouth every morning   Yes Historical Provider, MD   Cetirizine HCl (ZYRTEC ALLERGY PO) Take 20 mg by mouth every morning   Yes Historical Provider, MD   diazepam (VALIUM) 5 mg tablet Take 1 tablet (5 mg total) by mouth every 12 (twelve) hours for 4 days 4/2/18 4/14/18 Yes [place] Sox, CRNP   ibuprofen (MOTRIN) 600 mg tablet Take 1 tablet (600 mg total) by mouth every 8 (eight) hours for 4 days 4/2/18 4/14/18 Yes Ashley José Judythe Pallas, CRNP   levothyroxine 100 mcg tablet Take 125 mcg by mouth every morning     Yes Historical Provider, MD   ondansetron (ZOFRAN) 4 mg tablet Take 4 mg by mouth every 8 (eight) hours as needed for nausea or vomiting May take 2 tabs if needed   Yes Historical Provider, MD   PARoxetine (PAXIL) 30 mg tablet Take 1 tablet (30 mg total) by mouth every morning 4/3/18  Yes Ankita Goodrich MD     I have reviewed home medications with patient personally  Allergies: Allergies   Allergen Reactions    Cochrane (Diagnostic) Anaphylaxis     All stone nuts  Lips/tongue swell-Birch Tree allergy    Bee Venom Anaphylaxis     All over swelling/pain/redness    Soy Lecithin [Lecithin] Anaphylaxis     [place]/throat swelling-dyspnea       Social History:     Marital Status: /Civil Union   Patient Pre-hospital Living Situation: Home with    Patient Pre-hospital Level of Mobility: Independent   Patient Pre-hospital Diet Restrictions: None  Substance Use History:   History   Alcohol Use    3 6 oz/week    4 Standard drinks or equivalent per week     History   Smoking Status    Former Smoker    Packs/day: 0 25    Years: 10 00    Quit date: 2004   Smokeless Tobacco    Never Used     History   Drug Use    Frequency: 7 0 times per week    Types: Marijuana     Comment: last smoked today       Family History:    Family History   Problem Relation Age of Onset    Diabetes Mother      diet controlled    Diabetes Father      insulin    Other Father      agent orange       Physical Exam:     Vitals:   Blood Pressure: 137/80 (04/14/18 1558)  Pulse: 62 (04/14/18 1558)  Temperature: 99 4 °F (37 4 °C) (04/14/18 1558)  Temp Source: Temporal (04/14/18 1558)  Respirations: 18 (04/14/18 1558)  Height: 5' 4" (162 6 cm) (04/14/18 0900)  Weight - Scale: 104 kg (229 lb 0 9 oz) (04/14/18 0900)  SpO2: 95 % (04/14/18 1558)    Physical Exam   Constitutional: She is oriented to person, place, and time  She appears well-developed  No distress  Obese     HENT:   Head: Normocephalic  Neck: Normal range of motion  Cardiovascular: Normal rate and regular rhythm  Pulmonary/Chest: Effort normal and breath sounds normal  No respiratory distress  She has no wheezes  She has no rhonchi  She has no rales  Abdominal: Soft  Bowel sounds are normal  She exhibits no distension  There is no tenderness  Musculoskeletal: Normal range of motion  She exhibits no edema or tenderness  Neurological: She is alert and oriented to person, place, and time  She has normal reflexes  Skin: Skin is warm and dry  No rash noted  She is not diaphoretic  Psychiatric: She has a normal mood and affect  Her behavior is normal  Thought content normal    Nursing note and vitals reviewed  Additional Data:     Lab Results: I have personally reviewed pertinent reports  Results from last 7 days  Lab Units 04/14/18  0836   WBC Thousand/uL 10 40   HEMOGLOBIN g/dL 13 9   HEMATOCRIT % 41 3   PLATELETS Thousands/uL 248   NEUTROS PCT % 89*   LYMPHS PCT % 9*   MONOS PCT % 2*   EOS PCT % 0       Results from last 7 days  Lab Units 04/14/18  0836   SODIUM mmol/L 138   POTASSIUM mmol/L 3 6   CHLORIDE mmol/L 101   CO2 mmol/L 26   BUN mg/dL 6   CREATININE mg/dL 0 79   CALCIUM mg/dL 7 9   TOTAL PROTEIN g/dL 7 4   BILIRUBIN TOTAL mg/dL 0 30   ALK PHOS U/L 89   ALT U/L 37   AST U/L 25   GLUCOSE RANDOM mg/dL 177*       Results from last 7 days  Lab Units 04/14/18  0836   INR  0 98       Imaging: I have personally reviewed pertinent reports  CT head without contrast   Final Result by Pal Mace MD (04/14 3212)      No acute intracranial abnormality  Stable compared to prior  Workstation performed: [id number]             EKG, Pathology, and Other Studies Reviewed on Admission:   · EKG: None    Allscripts / Epic Records Reviewed: Yes     ** Please Note: This note has been constructed using a voice recognition system   **

## 2018-04-14 NOTE — PLAN OF CARE
DISCHARGE PLANNING     Discharge to home or other facility with appropriate resources Progressing        Potential for Falls     Patient will remain free of falls Progressing        SAFETY ADULT     Maintain or return to baseline ADL function Progressing     Maintain or return mobility status to optimal level Progressing     Patient will remain free of falls Progressing

## 2018-04-14 NOTE — ASSESSMENT & PLAN NOTE
Likely due to intractable migraine  ?  Cyclic vomiting from daily marijuana use    -Continued with Zofran and Reglan IV   -Diet as tolerated  -IVF until appetite improves

## 2018-04-14 NOTE — PLAN OF CARE
Problem: DISCHARGE PLANNING  Goal: Discharge to home or other facility with appropriate resources  INTERVENTIONS:  - Identify barriers to discharge w/patient and caregiver  - Arrange for needed discharge resources and transportation as appropriate  - Identify discharge learning needs (meds, wound care, etc )  - Arrange for interpretive services to assist at discharge as needed  - Refer to Case Management Department for coordinating discharge planning if the patient needs post-hospital services based on physician/advanced practitioner order or complex needs related to functional status, cognitive ability, or social support system   -Generally independent with no discharge needs at this time  Outcome: Progressing  Pt is generally independent  She lives with  in a private, multi-level home with 10 steps between floors  She uses [facility] in [place]  Her PCP is Dr Valeriy Choudhury  Pt has no DME or HHC  Also has no POA or living will  Pt is currently unemployed  She uses [employer] or  provides transportation  At this time, no discharge needs have been identified  Pt will discharge home with  when medically stable

## 2018-04-14 NOTE — ED PROVIDER NOTES
History  Chief Complaint   Patient presents with    Migraine     pt has had a migraine since this afternoon, vomiting for 1 hour and now complaining of tingling in her left fingertips     60-year-old white female with a history of lap band surgery presents with complaints of migrainous type headache associated with nausea and vomiting  Patient states she has had her symptoms for several hours with no improvement taking over-the-counter medication  No fever  No known trauma  History provided by:  Patient  Migraine   Severity:  Moderate  Onset quality:  Gradual  Duration:  1 hour  Timing:  Constant  Progression:  Unchanged  Chronicity:  Recurrent  Associated symptoms: headaches, nausea and vomiting    Associated symptoms: no abdominal pain, no chest pain, no congestion, no diarrhea, no fever, no loss of consciousness, no rash, no rhinorrhea, no shortness of breath, no sore throat and no wheezing        Prior to Admission Medications   Prescriptions Last Dose Informant Patient Reported? Taking?    BIOTIN PO 4/13/2018 at Unknown time Self Yes Yes   Sig: Take 30,000 mcg by mouth every morning   Cetirizine HCl (ZYRTEC ALLERGY PO) 4/13/2018 at Unknown time Self Yes Yes   Sig: Take 20 mg by mouth every morning   PARoxetine (PAXIL) 30 mg tablet 4/14/2018 at Unknown time  No Yes   Sig: Take 1 tablet (30 mg total) by mouth every morning   diazepam (VALIUM) 5 mg tablet 4/14/2018 at Unknown time Self No Yes   Sig: Take 1 tablet (5 mg total) by mouth every 12 (twelve) hours for 4 days   ibuprofen (MOTRIN) 600 mg tablet 4/13/2018 at Unknown time Self No Yes   Sig: Take 1 tablet (600 mg total) by mouth every 8 (eight) hours for 4 days   levothyroxine 100 mcg tablet 4/13/2018 at Unknown time Self Yes Yes   Sig: Take 125 mcg by mouth every morning     ondansetron (ZOFRAN) 4 mg tablet 4/14/2018 at Unknown time Self Yes Yes   Sig: Take 4 mg by mouth every 8 (eight) hours as needed for nausea or vomiting May take 2 tabs if needed      Facility-Administered Medications: None       Past Medical History:   Diagnosis Date    Abnormal ECG Dec 2015    Interventricular conduction relay disorder    Abnormal Pap smear of cervix Jan 2015    See medical records from Dr Sumanth Bello Anesthesia complication     Pt smokes marijuana 2 times/daily to control nausea-S/P gastric sleeve    Anxiety     Arthritis     hands    Disease of thyroid gland     hypo    HPV (human papilloma virus) infection Jan 2015    See medical records from Dr Sumanth Bello HTN (hypertension)     Nausea 12/2015    since gastric sleeve    Prediabetes     Urinary tract infection     chronic issue    Varicella March 1985       Past Surgical History:   Procedure Laterality Date    BARIATRIC SURGERY  Dec 2015    Gastric Sleeve    CERVICAL BIOPSY N/A 3/2/2017    Procedure: BIOPSY LEEP CERVIX;  Surgeon: Tonya Fong MD;  Location: [address] [place] [address];  Service:    Vena Frank CERVICAL BIOPSY  W/ LOOP ELECTRODE EXCISION  Mar 2017    See medical records from Dr Sumanth Bello ESOPHAGOGASTRODUODENOSCOPY     [address]  12/2015    sleeve at age 45    TONSILLECTOMY      adenoids-age 35    WISDOM TOOTH EXTRACTION      x4-age 18       Family History   Problem Relation Age of Onset    Diabetes Mother      diet controlled    Diabetes Father      insulin    Other Father      agent orange     I have reviewed and agree with the history as documented  Social History   Substance Use Topics    Smoking status: Former Smoker     Packs/day: 0 25     Years: 10 00     Quit date: 2004    Smokeless tobacco: Never Used    Alcohol use 3 6 oz/week     4 Standard drinks or equivalent per week        Review of Systems   Constitutional: Negative  Negative for chills and fever  HENT: Negative for congestion, rhinorrhea and sore throat  Respiratory: Negative for chest tightness, shortness of breath, wheezing and stridor      Cardiovascular: Negative for chest pain, palpitations and leg swelling  Gastrointestinal: Positive for nausea and vomiting  Negative for abdominal pain and diarrhea  Genitourinary: Negative  Musculoskeletal: Negative  Skin: Negative for rash  Neurological: Positive for headaches  Negative for loss of consciousness  Hematological: Negative  Psychiatric/Behavioral: Negative  All other systems reviewed and are negative  Physical Exam  ED Triage Vitals [04/14/18 0310]   Temperature Pulse Respirations Blood Pressure SpO2   (!) 96 2 °F (35 7 °C) 69 20 (!) 232/120 98 %      Temp Source Heart Rate Source Patient Position - Orthostatic VS BP Location FiO2 (%)   Tympanic Monitor Lying Right arm --      Pain Score       Worst Possible Pain           Orthostatic Vital Signs  Vitals:    04/15/18 0321 04/15/18 0842 04/15/18 1041 04/15/18 1046   BP: 134/81 (!) 164/101 (!) 178/104 152/100   Pulse: 56 56     Patient Position - Orthostatic VS: Lying Lying Sitting Sitting - Orthostatic VS       Physical Exam   Constitutional: She is oriented to person, place, and time  She appears well-developed and well-nourished  HENT:   Head: Normocephalic and atraumatic  Right Ear: External ear normal    Left Ear: External ear normal    Nose: Nose normal    Mouth/Throat: Oropharynx is clear and moist    Eyes: Conjunctivae and EOM are normal    Neck: Normal range of motion  Neck supple  Cardiovascular: Normal rate, regular rhythm, normal heart sounds and intact distal pulses  Pulmonary/Chest: Effort normal and breath sounds normal    Abdominal: Soft  Bowel sounds are normal    Musculoskeletal: Normal range of motion  Neurological: She is alert and oriented to person, place, and time  Skin: Skin is warm and dry  Capillary refill takes less than 2 seconds  Psychiatric: She has a normal mood and affect  Her behavior is normal  Judgment and thought content normal    Nursing note and vitals reviewed        ED Medications  Medications   sodium chloride 0 9 % bolus 1,000 mL (0 mL Intravenous Stopped 4/14/18 0440)   ondansetron (ZOFRAN) injection 4 mg (4 mg Intravenous Given 4/14/18 0323)   metoclopramide (REGLAN) injection 10 mg (10 mg Intravenous Given 4/14/18 0324)   magnesium sulfate 2 g/50 mL IVPB (premix) 2 g (0 g Intravenous Stopped 4/14/18 0538)   ketorolac (TORADOL) injection 30 mg (30 mg Intravenous Given 4/14/18 0324)   diphenhydrAMINE (BENADRYL) injection 25 mg (25 mg Intravenous Given 4/14/18 0324)   promethazine (PHENERGAN) injection 25 mg (25 mg Intravenous Given 4/14/18 0413)   SUMAtriptan (IMITREX) subcutaneous injection 6 mg (6 mg Subcutaneous Given 4/14/18 0414)   ondansetron (ZOFRAN) injection 4 mg (4 mg Intravenous Given 4/14/18 0558)   sodium chloride 0 9 % bolus 1,000 mL (1,000 mL Intravenous New Bag 4/14/18 0558)   potassium-sodium phosphateS (K-PHOS,PHOSPHA 250) -250 mg tablet 2 tablet (2 tablets Oral Given 4/14/18 1514)       Diagnostic Studies  Results Reviewed     None                 CT head without contrast   Final Result by Paige Smiley MD (04/14 5197)      No acute intracranial abnormality  Stable compared to prior  Workstation performed: [id number]                    Procedures  Procedures       Phone Contacts  ED Phone Contact    ED Course  ED Course                                MDM  Number of Diagnoses or Management Options  Intractable headache:   Intractable vomiting:   Diagnosis management comments: Patient refusing obstructive series  States her vomiting is related to her headache and she does not have any abdominal pain      CritCare Time    Disposition  Final diagnoses:   Intractable headache   Intractable vomiting     Time reflects when diagnosis was documented in both MDM as applicable and the Disposition within this note     Time User Action Codes Description Comment    4/14/2018  6:11 AM Kamille Rasmussen Add [R51] Intractable headache     4/14/2018  6:11 AM Kamille Jimenez Add [R11 10] Intractable vomiting 4/14/2018  8:31 AM Sigmmary Madrid Add [G43 909] Migraine     4/15/2018 10:48 AM Rosalie Madrid Add [G43 919] Intractable migraine       ED Disposition     ED Disposition Condition Comment    Admit  Case was discussed with Dr Jennifer Yu and the patient's admission status was agreed to be Admission Status: observation status to the service of Dr Jennifer Yu   Follow-up Information     Follow up With Specialties Details Why Contact Info    Omaira Valadez MD Internal Medicine, Family Medicine Schedule an appointment as soon as possible for a visit in 1 week(s)  [address]    [address] [facility]  [phone]      Giovani Helms MD Neurology Schedule an appointment as soon as possible for a visit in 1 week(s)  [address] [address]  [phone]          Discharge Medication List as of 4/15/2018 11:20 AM      CONTINUE these medications which have CHANGED    Details   diazepam (VALIUM) 2 mg tablet Take 1 tablet (2 mg total) by mouth daily as needed for anxiety for up to 10 doses, Starting Sun 4/15/2018, Normal      ibuprofen (MOTRIN) 600 mg tablet Take 1 tablet (600 mg total) by mouth every 8 (eight) hours as needed for mild pain, moderate pain or headaches for up to 20 doses, Starting Sun 4/15/2018, Normal      ondansetron (ZOFRAN) 4 mg tablet Take 1 tablet (4 mg total) by mouth every 8 (eight) hours as needed for nausea or vomiting May take 2 tabs if needed, Starting Sun 4/15/2018, Normal         CONTINUE these medications which have NOT CHANGED    Details   BIOTIN PO Take 30,000 mcg by mouth every morning, Historical Med      Cetirizine HCl (ZYRTEC ALLERGY PO) Take 20 mg by mouth every morning, Historical Med      levothyroxine 100 mcg tablet Take 125 mcg by mouth every morning  , Historical Med      PARoxetine (PAXIL) 30 mg tablet Take 1 tablet (30 mg total) by mouth every morning, Starting Tue 4/3/2018, Normal             Outpatient Discharge Orders  Discharge Diet     Activity as tolerated     Call provider for:  persistent nausea or vomiting     Call provider for:  severe uncontrolled pain         ED Provider  Electronically Signed by           Lena Knight MD  04/16/18 8815

## 2018-04-14 NOTE — SOCIAL WORK
SW referral received to assess needs and assist with DCP  SW interviewed pt at the bedside to obtain baseline information, discuss DCP and OB status  Pt is generally independent  She lives with  in a private, multi-level home with 10 steps between floors  She uses [address] in [place]  Her PCP is Dr Linda Shirley  Pt has no DME or HHC  Also has no POA or living will  Pt is currently unemployed  She uses [employer] or  provides transportation  At this time, no discharge needs have been identified  Pt will discharge home with  when medically stable  OB status discussed and signature obtained

## 2018-04-14 NOTE — ASSESSMENT & PLAN NOTE
TSH 3 995, improved from prior level  Pt was not taking Synthroid on an empty stomach until recommendations from previous discharge 12 days ago      -Follow-up TSH 4-6 weeks with PCP

## 2018-04-14 NOTE — ASSESSMENT & PLAN NOTE
/120, likely exacerbated by intractable vomiting and migraine     -BP improved with control of nausea, vomiting and migraine  -Continue Migraine cocktail and Zofran IV  -Monitor

## 2018-04-15 VITALS
SYSTOLIC BLOOD PRESSURE: 152 MMHG | TEMPERATURE: 98.4 F | WEIGHT: 229.06 LBS | DIASTOLIC BLOOD PRESSURE: 100 MMHG | HEIGHT: 64 IN | RESPIRATION RATE: 18 BRPM | HEART RATE: 56 BPM | BODY MASS INDEX: 39.11 KG/M2 | OXYGEN SATURATION: 94 %

## 2018-04-15 LAB
ANION GAP SERPL CALCULATED.3IONS-SCNC: 9 MMOL/L (ref 4–13)
BUN SERPL-MCNC: 6 MG/DL (ref 5–25)
CALCIUM SERPL-MCNC: 8 MG/DL
CHLORIDE SERPL-SCNC: 109 MMOL/L (ref 100–108)
CO2 SERPL-SCNC: 26 MMOL/L (ref 21–32)
CREAT SERPL-MCNC: 0.67 MG/DL (ref 0.6–1.3)
ERYTHROCYTE [DISTWIDTH] IN BLOOD BY AUTOMATED COUNT: 14.7 % (ref 11.6–15.1)
GFR SERPL CREATININE-BSD FRML MDRD: 109 ML/MIN/1.73SQ M
GLUCOSE P FAST SERPL-MCNC: 123 MG/DL (ref 65–99)
GLUCOSE SERPL-MCNC: 123 MG/DL (ref 65–140)
HCT VFR BLD AUTO: 36 % (ref 37–47)
HGB BLD-MCNC: 12 G/DL (ref 12–16)
MCH RBC QN AUTO: 32.8 PG (ref 27–31)
MCHC RBC AUTO-ENTMCNC: 33.2 G/DL (ref 31.4–37.4)
MCV RBC AUTO: 99 FL (ref 82–98)
MRSA NOSE QL CULT: NORMAL
PHOSPHATE SERPL-MCNC: 2.8 MG/DL (ref 2.7–4.5)
PLATELET # BLD AUTO: 204 THOUSANDS/UL (ref 130–400)
PMV BLD AUTO: 7.8 FL (ref 8.9–12.7)
POTASSIUM SERPL-SCNC: 3 MMOL/L (ref 3.5–5.3)
RBC # BLD AUTO: 3.65 MILLION/UL (ref 4.2–5.4)
SODIUM SERPL-SCNC: 144 MMOL/L (ref 136–145)
WBC # BLD AUTO: 6.5 THOUSAND/UL (ref 4.8–10.8)

## 2018-04-15 PROCEDURE — 85027 COMPLETE CBC AUTOMATED: CPT | Performed by: NURSE PRACTITIONER

## 2018-04-15 PROCEDURE — 84100 ASSAY OF PHOSPHORUS: CPT | Performed by: NURSE PRACTITIONER

## 2018-04-15 PROCEDURE — 99217 PR OBSERVATION CARE DISCHARGE MANAGEMENT: CPT | Performed by: FAMILY MEDICINE

## 2018-04-15 PROCEDURE — 80048 BASIC METABOLIC PNL TOTAL CA: CPT | Performed by: NURSE PRACTITIONER

## 2018-04-15 RX ORDER — IBUPROFEN 600 MG/1
600 TABLET ORAL EVERY 8 HOURS PRN
Qty: 20 TABLET | Refills: 0 | Status: SHIPPED | OUTPATIENT
Start: 2018-04-15 | End: 2018-07-30 | Stop reason: ALTCHOICE

## 2018-04-15 RX ORDER — POTASSIUM CHLORIDE 20 MEQ/1
40 TABLET, EXTENDED RELEASE ORAL EVERY 4 HOURS
Status: DISCONTINUED | OUTPATIENT
Start: 2018-04-15 | End: 2018-04-15 | Stop reason: HOSPADM

## 2018-04-15 RX ORDER — ONDANSETRON 4 MG/1
4 TABLET, FILM COATED ORAL EVERY 8 HOURS PRN
Qty: 20 TABLET | Refills: 0 | Status: SHIPPED | OUTPATIENT
Start: 2018-04-15 | End: 2020-03-30

## 2018-04-15 RX ORDER — DIAZEPAM 2 MG/1
2 TABLET ORAL DAILY PRN
Qty: 10 TABLET | Refills: 0 | Status: SHIPPED | OUTPATIENT
Start: 2018-04-15 | End: 2018-07-30 | Stop reason: ALTCHOICE

## 2018-04-15 RX ADMIN — METOCLOPRAMIDE 10 MG: 5 INJECTION, SOLUTION INTRAMUSCULAR; INTRAVENOUS at 08:53

## 2018-04-15 RX ADMIN — DIAZEPAM 5 MG: 5 TABLET ORAL at 08:47

## 2018-04-15 RX ADMIN — SODIUM CHLORIDE 50 ML/HR: 0.9 INJECTION, SOLUTION INTRAVENOUS at 08:37

## 2018-04-15 RX ADMIN — KETOROLAC TROMETHAMINE 30 MG: 30 INJECTION, SOLUTION INTRAMUSCULAR at 08:51

## 2018-04-15 RX ADMIN — DIPHENHYDRAMINE HYDROCHLORIDE 12.5 MG: 50 INJECTION, SOLUTION INTRAMUSCULAR; INTRAVENOUS at 08:50

## 2018-04-15 RX ADMIN — PAROXETINE HYDROCHLORIDE 30 MG: 20 TABLET, FILM COATED ORAL at 08:46

## 2018-04-15 RX ADMIN — METOCLOPRAMIDE 10 MG: 5 INJECTION, SOLUTION INTRAMUSCULAR; INTRAVENOUS at 01:15

## 2018-04-15 RX ADMIN — KETOROLAC TROMETHAMINE 30 MG: 30 INJECTION, SOLUTION INTRAMUSCULAR at 01:19

## 2018-04-15 RX ADMIN — LEVOTHYROXINE SODIUM 125 MCG: 125 TABLET ORAL at 06:40

## 2018-04-15 RX ADMIN — FAMOTIDINE 20 MG: 20 TABLET ORAL at 08:46

## 2018-04-15 RX ADMIN — POTASSIUM CHLORIDE 40 MEQ: 1500 TABLET, EXTENDED RELEASE ORAL at 08:48

## 2018-04-15 RX ADMIN — DIPHENHYDRAMINE HYDROCHLORIDE 12.5 MG: 50 INJECTION, SOLUTION INTRAMUSCULAR; INTRAVENOUS at 01:17

## 2018-04-15 NOTE — DISCHARGE INSTRUCTIONS
Migraine:   Take Ibuprofen 600 mg every 8 hours as needed for pain   Take Valium 2 mg daily prn up to 3 doses if pain is not controlled with Ibuprofen alone  Zofran ODT as need for nausea  Remain well hydrated   Make an appt with neurology and PCP        Migraine Headache   WHAT YOU NEED TO KNOW:   What is a migraine headache? A migraine is a severe headache  The pain can be so severe that it interferes with your daily activities  A migraine can last a few hours up to several days  The exact cause of migraines is not known  What can trigger a migraine headache? · Stress, eye strain, oversleeping, or not getting enough sleep    · Hormone changes in women from birth control pills, pregnancy, menopause, or during a monthly period    · Skipping meals, going too long without eating, or not drinking enough liquids    · Certain foods or drinks such as chocolate, hard cheese, red wine, or drinks that contain caffeine    · Foods that contain gluten, nitrates, MSG, or artificial sweeteners    · Sunlight, bright or flashing lights, loud noises, smoke, or strong smells    · Heat, humidity, or changes in the weather  What are the warning signs that a migraine headache is about to start? Warning signs usually start 15 to 60 minutes before the headache:  · Visual changes (auras), such as blurred vision, temporary blind or bright spots, lines, or hallucinations    · Unusual tiredness or frequent yawning    · Tingling in an arm or leg  What are the signs and symptoms of a migraine headache? A migraine headache usually begins as a dull ache around the eye or temple  The pain may get worse with movement   You may also have the following:  · Pain in your head that may increase to the point that you cannot do everyday activities    · Pain on one or both sides of your head    · Throbbing, pulsing, or pounding pain in your head    · Nausea and vomiting    · Sensitivity to light, noise, or smells  How is a migraine headache diagnosed? Your healthcare provider will ask questions about your headaches  Describe the pain and any other symptoms, such as nausea  Tell the provider if you think anything triggered the pain  The provider will also want to know what you ate and drank before the pain started  Tell the provider about any medical conditions you have or that run in your family  Include any recent stressors you have had  You may also need any of the following:  · A neurologic exam  is used to check how your pupils react to light  Your healthcare provider may check your memory, hand grasp, and balance  · CT or MRI pictures  may be taken of your brain  You may be given contrast liquid to help your brain show up better in the pictures  Tell the healthcare provider if you have ever had an allergic reaction to contrast liquid  Do not enter the MRI room with anything metal  Metal can cause serious injury  Tell the healthcare provider if you have any metal in or on your body  How is a migraine headache treated? Migraines cannot be cured  The goal of treatment is to reduce your symptoms  Take medicine as soon as you feel a migraine begin  · Prescription pain medicine  may be given  Do not wait until the pain is severe before you take your medicine  · Migraine medicines  are used to help prevent a migraine or stop it once it starts  · Antinausea medicine  may be given to calm your stomach and to help prevent vomiting  This medicine can also help relieve pain  What can I do to manage my symptoms? · Rest in a dark, quiet room  This will help decrease your pain  Sleep may also help relieve the pain  · Apply ice to decrease pain  Use an ice pack, or put crushed ice in a plastic bag  Cover the ice pack with a towel and place it on your head  Apply ice for 15 to 20 minutes every hour  · Apply heat to decrease pain and muscle spasms  Use a small towel dampened with warm water or a heating pad, or sit in a warm bath   Apply heat on the area for 20 to 30 minutes every 2 hours  You may alternate heat and ice  · Keep a migraine record  Write down when your migraines start and stop  Include your symptoms and what you were doing when a migraine began  Record what you ate or drank for 24 hours before the migraine started  Keep track of what you did to treat your migraine and if it worked  Bring the migraine record with you to visits with your healthcare provider  What can I do to prevent another migraine headache? · Do not smoke  Nicotine and other chemicals in cigarettes and cigars can trigger a migraine or make it worse  Ask your healthcare provider for information if you currently smoke and need help to quit  E-cigarettes or smokeless tobacco still contain nicotine  Talk to your healthcare provider before you use these products  · Do not drink alcohol  Alcohol can trigger a migraine  It can also keep medicines used to treat your migraines from working  · Get regular exercise  Exercise may help prevent migraines  Talk to your healthcare provider about the best exercise plan for you  Try to get at least 30 minutes of exercise on most days  · Manage stress  Stress may trigger a migraine  Learn new ways to relax, such as deep breathing  · Create a sleep schedule  Go to bed and get up at the same times each day  Do not watch television before bed  · Eat regular meals  Include healthy foods such as include fruit, vegetables, whole-grain breads, low-fat dairy products, beans, lean meat, and fish  Do not have food or drinks that trigger your migraines  When should I seek immediate care? · You have a headache that seems different or much worse than your usual migraine headache  · You have a severe headache with a fever or a stiff neck  · You have new problems with speech, vision, balance, or movement  · You feel like you are going to faint, you become confused, or you have a seizure    When should I contact my healthcare provider? · Your migraines interfere with your daily activities  · Your medicines or treatments stop working  · You have questions or concerns about your condition or care  CARE AGREEMENT:   You have the right to help plan your care  Learn about your health condition and how it may be treated  Discuss treatment options with your caregivers to decide what care you want to receive  You always have the right to refuse treatment  The above information is an  only  It is not intended as medical advice for individual conditions or treatments  Talk to your doctor, nurse or pharmacist before following any medical regimen to see if it is safe and effective for you  © 2017 [employer] Information is for End User's use only and may not be sold, redistributed or otherwise used for commercial purposes  All illustrations and images included in CareNotes® are the copyrighted property of [employer]  or Osmar Salazar  Ondansetron (By mouth, Into the mouth)   Ondansetron (on-DAN-se-daniel)  Prevents nausea and vomiting  Brand Name(s): Zofran, Zofran ODT, Zuplenz   There may be other brand names for this medicine  When This Medicine Should Not Be Used: This medicine is not right for everyone  Do not use it if you had an allergic reaction to ondansetron  How to Use This Medicine: Thin Sheet, Liquid, Tablet, Dissolving Tablet  · Your doctor will tell you how much medicine to use  Do not use more than directed  · Measure the oral liquid medicine with a marked measuring spoon, oral syringe, or medicine cup  · To use the disintegrating tablet:   ¨ Do not open the blister pack that contains the tablet until you are ready to take it  ¨ Make sure your hands are dry  Peel back the foil, then remove the tablet from the blister pack  Do not push the tablet through the foil  ¨ Place the tablet on top of your tongue where it will dissolve in seconds   After the tablet has melted, swallow or take a sip of water  · To use the soluble film:   ¨ Make sure your hands are clean and dry  ¨ Fold the pouch along the dotted line  ¨ While still folded, tear the pouch carefully along the edge  Remove the film from the pouch  ¨ Place the film on top of your tongue  It will dissolve in 4 to 20 seconds  Do not chew or swallow the film whole  ¨ After the film has dissolved, you may swallow with or without water  · Read and follow the patient instructions that come with this medicine  Talk to your doctor or pharmacist if you have any questions  · Missed dose: Take a dose as soon as you remember  If it is almost time for your next dose, wait until then and take a regular dose  Do not take extra medicine to make up for a missed dose  · Store the medicine in a closed container at room temperature, away from heat, moisture, and direct light  Keep the soluble film in the foil pouch until you ready to use it  Drugs and Foods to Avoid:   Ask your doctor or pharmacist before using any other medicine, including over-the-counter medicines, vitamins, and herbal products  · Do not use this medicine together with apomorphine  · Some medicines may affect how ondansetron works  Tell your doctor if you are using tramadol, diuretics (water pills), or any other medicine for nausea and vomiting  Warnings While Using This Medicine:   · Tell your doctor if you are pregnant or breastfeeding, or if you have liver disease, congestive heart failure, heart rhythm problems (such as prolonged QT interval, slow heartbeat), low magnesium or potassium levels, stomach or bowel problems, or phenylketonuria (PKU)  · This medicine may cause heart rhythm problems (such as QT prolongation)  · This medicine may make you dizzy  Do not drive or do anything else that could be dangerous until you know how this medicine affects you  · Keep all medicine out of the reach of children   Never share your medicine with anyone  Possible Side Effects While Using This Medicine:   Call your doctor right away if you notice any of these side effects:  · Allergic reaction: Itching or hives, swelling in your face or hands, swelling or tingling in your mouth or throat, chest tightness, trouble breathing  · Fainting, dizziness, or lightheadedness  · Fast, pounding, or uneven heartbeat  · Trouble breathing  If you notice these less serious side effects, talk with your doctor:   · Constipation or diarrhea  · Headache  · Tiredness or weakness  If you notice other side effects that you think are caused by this medicine, tell your doctor  Call your doctor for medical advice about side effects  You may report side effects to FDA at 7-819-FDA-9823  © 2017 [address] [employer] Information is for End User's use only and may not be sold, redistributed or otherwise used for commercial purposes  The above information is an  only  It is not intended as medical advice for individual conditions or treatments  Talk to your doctor, nurse or pharmacist before following any medical regimen to see if it is safe and effective for you  Diazepam (By mouth)   Diazepam (dye-AZ-e-tricia)  Treats anxiety, alcohol withdrawal, muscle spasms, and seizures  Brand Name(s): Valium, diazePAM Intensol   There may be other brand names for this medicine  When This Medicine Should Not Be Used: This medicine is not right for everyone  Do not use it if you had an allergic reaction to diazepam, or if you are pregnant or breastfeeding, or you have narrow-angle glaucoma, myasthenia gravis, sleep apnea, or severe breathing problems  Do not give this medicine to any child 10months of age or younger  How to Use This Medicine:   Liquid, Tablet  · Your doctor will tell you how much medicine to use  Do not use more than directed  · Oral liquid: Measure the oral liquid medicine with a marked measuring spoon, oral syringe, or medicine cup    · This medicine should come with a Medication Guide  Ask your pharmacist for a copy if you do not have one  · Missed dose: Take a dose as soon as you remember  If it is almost time for your next dose, wait until then and take a regular dose  Do not take extra medicine to make up for a missed dose  · Store the medicine in a closed container at room temperature, away from heat, moisture, and direct light  Do not freeze the oral liquid  Drugs and Foods to Avoid:   Ask your doctor or pharmacist before using any other medicine, including over-the-counter medicines, vitamins, and herbal products  · Some medicines can affect how diazepam works  Tell your doctor if you are using any of the following:   ¨ Cimetidine, fluoxetine, fluvoxamine, ketoconazole, omeprazole, phenytoin, or theophylline  ¨ MAO inhibitor  ¨ Medicine to treat depression or mental health problem  ¨ Medicine to treat seizures  ¨ Phenothiazine medicine  · Do not drink alcohol while you are using this medicine  · Tell your doctor if you use anything else that makes you sleepy  Some examples are allergy medicine, narcotic pain medicine, and alcohol  Warnings While Using This Medicine:   · It is not safe to take this medicine during pregnancy  It could harm an unborn baby  Tell your doctor right away if you become pregnant  · Tell your doctor if you have kidney disease, liver disease, glaucoma, lung or breathing problems, or a history of drug or alcohol abuse, depression, mental health problems, or seizures  · This medicine may cause the following problems:  ¨ Respiratory depression (serious breathing problem that can be life-threatening), when used with narcotic pain medicines  · This medicine can increase thoughts of suicide  Tell your doctor right away if you start to feel depressed and have thoughts about hurting yourself  · This medicine may make you dizzy or drowsy   Do not drive or do anything else that could be dangerous until you know how this medicine affects you  · This medicine can be habit-forming  Do not use more than your prescribed dose  Call your doctor if you think your medicine is not working  · Do not stop using this medicine suddenly  Your doctor will need to slowly decrease your dose before you stop it completely  · Your doctor will do lab tests at regular visits to check on the effects of this medicine  Keep all appointments  · Keep all medicine out of the reach of children  Never share your medicine with anyone  Possible Side Effects While Using This Medicine:   Call your doctor right away if you notice any of these side effects:  · Allergic reaction: Itching or hives, swelling in your face or hands, swelling or tingling in your mouth or throat, chest tightness, trouble breathing  · Blue lips, fingernails, or skin  · Confusion, lightheadedness, dizziness, problems with muscle control or coordination  · Extreme drowsiness or weakness, slow heartbeat, trouble breathing  · Seizures or tremors  · Unusual mood or behavior, worsening depression, thoughts about hurting yourself, trouble sleeping  If you notice these less serious side effects, talk with your doctor:   · Blurred vision, changes in vision  · Headache  · Nausea, vomiting, constipation, diarrhea, stomach pain  · Tiredness  If you notice other side effects that you think are caused by this medicine, tell your doctor  Call your doctor for medical advice about side effects  You may report side effects to FDA at 8-267-FDA-7751  © 2017 [address] Information is for End User's use only and may not be sold, redistributed or otherwise used for commercial purposes  The above information is an  only  It is not intended as medical advice for individual conditions or treatments  Talk to your doctor, nurse or pharmacist before following any medical regimen to see if it is safe and effective for you

## 2018-04-15 NOTE — ASSESSMENT & PLAN NOTE
/120, likely exacerbated by intractable vomiting and migraine  BP improved with control of nausea, vomiting and migraine  Presently 158/100  Will discontinue IVF  -Will need to follow-up with her PCP for a repeat BP check

## 2018-04-15 NOTE — DISCHARGE SUMMARY
Discharge- Freddy Wood 1975, 44 y o  female MRN: [id number]    Unit/Bed#: [address] [address] Encounter: [id number]    Primary Care Provider: Sneha Vasquez MD   Date and time admitted to hospital: 4/14/2018  3:07 AM        * Intractable migraine   Assessment & Plan    Pain located in right frontal region  + photosensitivity, associated with nausea and vomiting  Recently discharged for intractable headache 12 days ago with Rx for Valium and Ibuprofen and recommendation to f/u with neurology if no improvement  Presently, pain 2/10 with migraine cocktail: Benadry, Reglan, and Toradol IV Q 8hrs  -Valium 5 mg po daily  -S/p Mag Sulfate 2 gm IV x1  -IVF    Will discharge with Ibuprofen 600 mg q8h prn, valium 2 mg po q daily prn   Follow-up with neurology and PCP  Remain well hydrated         Intractable vomiting with nausea   Assessment & Plan    Likely due to intractable migraine  ? Cyclic vomiting from daily marijuana use    -Resolved with Zofran and Reglan IV   -Discharge with po Zofran           Hypertensive urgency   Assessment & Plan    /120, likely exacerbated by intractable vomiting and migraine  BP improved with control of nausea, vomiting and migraine  Presently 158/100  Will discontinue IVF  -Will need to follow-up with her PCP for a repeat BP check  Hypothyroidism   Assessment & Plan    TSH 3 995, improved from prior level  Pt was not taking Synthroid on an empty stomach until recommendations from previous discharge 12 days ago      -Follow-up TSH 4-6 weeks with PCP        Marijuana abuse   Assessment & Plan    Pt states marijuana use 2-3x/daily for years as it helps with nausea and appetite         Anxiety   Assessment & Plan    -Continue with Paxil daily               Discharging Physician / Practitioner: MARIA DEL ROSARIO Steven  PCP: Sneha Vasqeuz MD  Admission Date:   Admission Orders     Ordered        04/14/18 0625  Place in Observation (expected length of stay for this patient is less than two midnights)  Once             Discharge Date: 04/15/18    Resolved Problems  Date Reviewed: 4/15/2018    None          Consultations During Hospital Stay:  · None    Procedures Performed:     · CT head: No acute intracranial abnormality  Stable compared to prior  Significant Findings / Test Results:     · None    Incidental Findings:   · None     Test Results Pending at Discharge (will require follow up): · None     Outpatient Tests Requested:  · None    Complications:  None    Reason for Admission: Intractable migraine, N/V, hypertensive urgency     Hospital Course: Pedro Pablo Mccord is a 44 y o  female patient with a PMH including HTN, hypothyroidism, anxiety, migraines, and daily marijuana use who originally presented to the hospital on 4/14/2018 due to an intractable migraine  Noted to also have nausea, vomiting, and hypertensive urgency during workup in the emergency department  Patient was admitted for further evaluation and treatment  Her headache improved on migraine cocktail with IV Reglan + Toradol + Benadryl every 8 hours  She was given Mag sulfate IV x1  She was treated with IV fluids until appetite improved  Her electrolytes were repleted accordingly  Her hypertensive urgency was likely due to her intractable migraine with vomiting  Blood pressures improved with relief of vomiting and migraine pain  On morning of discharge, patient is alert and ambulating around her room  She is tolerating a regular diet  She is requesting to be discharged home  However, her morning blood pressure began to elevate with a manual blood pressure of 152/100  Prior blood pressures include,134/81 and 137/75  She is asymptomatic  She will follow up with her PCP for blood pressure check in 1 week  She will be discharged with ibuprofen 600 mg q 8h prn and valium 2 mg po daily prn for recurrent migraines  She is to follow up with Neurology in 1 week      Please see above list of diagnoses and related plan for additional information  Condition at Discharge: stable     Discharge Day Visit / Exam:     Subjective:  Overall, patient's migraine has greatly improved  States her pain is a 2/10 in the right frontal region which is better johny 7-10/10 from yesterday  Able to tolerated liquids and small meals  No further nausea or vomiting  Ambulated to the bathroom today without dizziness or diarrhea  Requesting to be discharged home to relax in her own bed  Denies CP or SOB  Vitals: Blood Pressure: 152/100 (04/15/18 1046)  Pulse: 56 (04/15/18 0842)  Temperature: 98 4 °F (36 9 °C) (04/15/18 0842)  Temp Source: Temporal (04/15/18 0842)  Respirations: 18 (04/15/18 0842)  Height: 5' 4" (162 6 cm) (04/14/18 0900)  Weight - Scale: 104 kg (229 lb 0 9 oz) (04/14/18 0900)  SpO2: 94 % (04/15/18 0842)  Exam:   Physical Exam   Constitutional: She is oriented to person, place, and time  She appears well-developed  No distress  HENT:   Head: Normocephalic  Neck: Normal range of motion  Cardiovascular: Normal rate and regular rhythm  Pulmonary/Chest: Effort normal and breath sounds normal  No respiratory distress  She has no wheezes  She has no rhonchi  She has no rales  Abdominal: Soft  Bowel sounds are normal  She exhibits no distension  There is no tenderness  Musculoskeletal: Normal range of motion  She exhibits no edema  Neurological: She is alert and oriented to person, place, and time  Skin: Skin is warm and dry  She is not diaphoretic  Psychiatric: She has a normal mood and affect  Judgment normal    Nursing note and vitals reviewed  Discussion with Family:     Discharge instructions/Information to patient and family:   See after visit summary for information provided to patient and family  Provisions for Follow-Up Care:  See after visit summary for information related to follow-up care and any pertinent home health orders        Disposition:     Home    For Discharges to [facility] SNF:   · Not Applicable to this Patient - Not Applicable to this Patient    Planned Readmission: None     Discharge Statement:  I spent 35 minutes discharging the patient  This time was spent on the day of discharge  I had direct contact with the patient on the day of discharge  Greater than 50% of the total time was spent examining patient, answering all patient questions, arranging and discussing plan of care with patient as well as directly providing post-discharge instructions  Additional time then spent on discharge activities  Discharge Medications:  See after visit summary for reconciled discharge medications provided to patient and family        ** Please Note: This note has been constructed using a voice recognition system **

## 2018-04-15 NOTE — ASSESSMENT & PLAN NOTE
Pain located in right frontal region  + photosensitivity, associated with nausea and vomiting  Recently discharged for intractable headache 12 days ago with Rx for Valium and Ibuprofen and recommendation to f/u with neurology if no improvement       Presently, pain 2/10 with migraine cocktail: Benadry, Reglan, and Toradol IV Q 8hrs  -Valium 5 mg po daily  -S/p Mag Sulfate 2 gm IV x1  -IVF    Will discharge with Ibuprofen 600 mg q8h prn, valium 2 mg po q daily prn   Follow-up with neurology and PCP  Remain well hydrated

## 2018-04-15 NOTE — ASSESSMENT & PLAN NOTE
Likely due to intractable migraine  ?  Cyclic vomiting from daily marijuana use    -Resolved with Zofran and Reglan IV   -Discharge with po Zofran

## 2018-04-15 NOTE — CASE MANAGEMENT
Initial Clinical Review    Admission: Date/Time/Statement:   04/14/18 0616      Orders Placed This Encounter   Procedures    Place in Observation (expected length of stay for this patient is less than two midnights)     Standing Status:   Standing     Number of Occurrences:   1     Order Specific Question:   Admitting Physician     Answer:   Nicholas Silva [id number]     Order Specific Question:   Level of Care     Answer:   Med Surg [16]         ED: Date/Time/Mode of Arrival:   ED Arrival Information     Expected Arrival Acuity Means of Arrival Escorted By Service Admission Type    - 4/14/2018 03:01 Urgent Walk-In Family Member General Medicine Urgent    Arrival Complaint    [address] [facility]          Chief Complaint:   Chief Complaint   Patient presents with    Migraine     pt has had a migraine since this afternoon, vomiting for 1 hour and now complaining of tingling in her left fingertips       History of Illness: Julianna Ang is a 44 y o  female with a PMH anxiety, hypothyroidism, HTN, daily marijuana abuse, and headache who presents with intractable vomiting and migraine  Patient noted photophobia and a bright 'light' in the periphery of her left eye followed by an intractable headache when she awoke yesterday morning  She took Motrin 600 mg po x1 which eased her headache temporarily, but then headache worsened throughout the day  Patient later developed nausea and vomiting, stating she vomiting 15-20 times  She could not stop vomiting and her migraine worsened, therefore, she sought medical attention in [facility] ED  Patient describes her pain as constant, throbbing, localized to the right frontal region, 10/10  Her pain was alleviated in the ED with Migraine cocktail  Her nausea subsided with IV Reglan, Zofran, and Phenergan  She denies double vision or blurry vision         ED Vital Signs:   ED Triage Vitals [04/14/18 0310]   Temperature Pulse Respirations Blood Pressure SpO2   (!) 96 2 °F (35 7 °C) 69 20 Kelley Gamboa ) 232/120 98 %      Pain Score       Worst Possible Pain        Wt Readings from Last 1 Encounters:   04/14/18 104 kg (229 lb 0 9 oz)       Vital Signs (abnormal):    04/14/18 0740  99 1 °F (37 3 °C)  64  18   171/79  94 %  None (Room air)  Lying   04/14/18 0537  --  65  20   138/47  98 %  None (Room air)  Lying   04/14/18 0515  --  --  --   181/106  --  --       Pain Score  Worst   Worst   5 6 6  7 5 7 6 6       Abnormal Labs/Diagnostic Test Results   CRP 4 2 (H)      TSH 3RD GENERATON 3 955 (H)      Sed Rate 67 (H)           ED Treatment:   Medication Administration from 04/14/2018 0301 to 04/14/2018 0739       Date/Time Order Dose Route     04/14/2018 0324 sodium chloride 0 9 % bolus 1,000 mL 1,000 mL Intravenous     04/14/2018 0323 ondansetron (ZOFRAN) injection 4 mg 4 mg Intravenous     04/14/2018 0324 metoclopramide (REGLAN) injection 10 mg 10 mg Intravenous     04/14/2018 0324 magnesium sulfate 2 g/50 mL IVPB (premix) 2 g 2 g Intravenous     04/14/2018 0324 ketorolac (TORADOL) injection 30 mg 30 mg Intravenous     04/14/2018 0324 diphenhydrAMINE (BENADRYL) injection 25 mg 25 mg Intravenous     04/14/2018 0413 promethazine (PHENERGAN) injection 25 mg 25 mg Intravenous     04/14/2018 0414 SUMAtriptan (IMITREX) subcutaneous injection 6 mg 6 mg Subcutaneous     04/14/2018 0558 ondansetron (ZOFRAN) injection 4 mg 4 mg Intravenous     04/14/2018 0558 sodium chloride 0 9 % bolus 1,000 mL 1,000 mL Intravenous       Past Medical/Surgical History:    Active Ambulatory Problems     Diagnosis Date Noted    Intractable migraine 04/01/2018    Anxiety     HTN (hypertension)     Status post laparoscopic sleeve gastrectomy 04/01/2018    Hypothyroidism 04/01/2018    Marijuana abuse 04/02/2018    Hyperlipemia, mixed 12/11/2014    Hypokalemia 01/18/2017    Incomplete RBBB 12/10/2015    Psoriasis 12/11/2014    Sleep apnea 02/04/2015    Vitamin D deficiency 02/10/2015     Resolved Ambulatory Problems     Diagnosis Date Noted    DOMO III (cervical intraepithelial neoplasia III) 03/02/2017     Past Medical History:   Diagnosis Date    Abnormal ECG Dec 2015    Abnormal Pap smear of cervix Jan 2015    Anesthesia complication     Anxiety     Arthritis     Disease of thyroid gland     HPV (human papilloma virus) infection Jan 2015    HTN (hypertension)     Nausea 12/2015    Prediabetes     Urinary tract infection     Varicella March 1985       Admitting Diagnosis: Migraine [G43 909]  Intractable vomiting [R11 10]  Intractable headache [R51]    Age/Sex: 44 y o  female    Assessment/Plan:   Intractable migraine   Assessment & Plan     Pain located in right frontal region  + photosensitivity, associated with nausea and vomiting  Recently discharged for intractable headache 12 days ago with Rx for Valium and Ibuprofen and recommendation to f/u with neurology if no improvement       -Migraine cocktail with: Benadry, Reglan, and Toradol IV Q 8hrs  -Valium 5 mg po daily  -S/p Mag Sulfate 2 gm IV x1  -Consult neurology  -IVF          Intractable vomiting with nausea   Assessment & Plan     Likely due to intractable migraine  ? Cyclic vomiting from daily marijuana use     -Continued with Zofran and Reglan IV   -Diet as tolerated  -IVF until appetite improves           Hypertensive urgency   Assessment & Plan     /120, likely exacerbated by intractable vomiting and migraine      -BP improved with control of nausea, vomiting and migraine  -Continue Migraine cocktail and Zofran IV  -Monitor           Hypothyroidism   Assessment & Plan     TSH 3 995, improved from prior level   Pt was not taking Synthroid on an empty stomach until recommendations from previous discharge 12 days ago      -Follow-up TSH 4-6 weeks with PCP          Marijuana abuse   Assessment & Plan     Pt states marijuana use 2-3x/daily for years as it helps with nausea and appetite      -Monitor           Anxiety   Assessment & Plan     -Continue with Paxil daily          Admission Orders:    CT HEAD  NO ACUTE FINDING   CONSULT NEUROLOGY  SEQUENTIAL COMPRESSION DEVICE       Scheduled Meds:   Current Facility-Administered Medications:  acetaminophen 650 mg Oral Q6H PRN   diazepam 5 mg Oral Daily   diphenhydrAMINE 12 5 mg Intravenous Q8H   diphenhydrAMINE 25 mg Intravenous Q8H PRN   famotidine 20 mg Oral Daily   ketorolac 30 mg Intravenous Q8H   levothyroxine 125 mcg Oral Early Morning   metoclopramide 10 mg Intravenous Q8H   ondansetron 4 mg Intravenous Q6H PRN   PARoxetine 30 mg Oral QAM   potassium chloride 40 mEq Oral Q4H   sodium chloride 50 mL/hr Intravenous Continuous     Continuous Infusions:   sodium chloride 50 mL/hr     PRN Meds:   acetaminophen    diphenhydrAMINE    ondansetron

## 2018-04-15 NOTE — NURSING NOTE
Reviewed AVS with patient and , they expressed verbal understanding  Patient left with her belongings, escorted by   Patient refused a wheel chair and ambulated well

## 2018-05-30 ENCOUNTER — ANNUAL EXAM (OUTPATIENT)
Dept: OBGYN CLINIC | Facility: CLINIC | Age: 43
End: 2018-05-30
Payer: COMMERCIAL

## 2018-05-30 VITALS — DIASTOLIC BLOOD PRESSURE: 82 MMHG | SYSTOLIC BLOOD PRESSURE: 134 MMHG | WEIGHT: 228 LBS | BODY MASS INDEX: 39.14 KG/M2

## 2018-05-30 DIAGNOSIS — Z30.430 ENCOUNTER FOR IUD INSERTION: Primary | ICD-10-CM

## 2018-05-30 DIAGNOSIS — Z30.09 ENCOUNTER FOR COUNSELING REGARDING CONTRACEPTION: ICD-10-CM

## 2018-05-30 LAB — SL AMB POCT URINE HCG: NEGATIVE

## 2018-05-30 PROCEDURE — 81025 URINE PREGNANCY TEST: CPT | Performed by: OBSTETRICS & GYNECOLOGY

## 2018-05-30 PROCEDURE — 99213 OFFICE O/P EST LOW 20 MIN: CPT | Performed by: OBSTETRICS & GYNECOLOGY

## 2018-05-30 NOTE — PROGRESS NOTES
Assessment/Plan:    Patient today is unsure whether she would like IUD or not  By the end of our conversation, patient states that she is 100% positive that she would like a tubal ligation  Options have been discussed, and patient will return to office for annual   She can think about what she would like in that time, and then we can schedule her surgery at that time if she would still like it  Had a LEEP, and needs a repeat pap  Counseling / Coordination of Care  Total time spent today20 minutes  minutes  Greater than 50% of total time was spent with the patient and / or family counseling and / or coordination of care  Donald Santiago is a 44 y o  female here for IUD discussion  Patient would like the paragard IUD  She states that her periods are now occurring every 60 days  Patient does not want an IUD with hormones because she has read online, and is scared of the weight gain and mood swings  She is positive that she does not want to get pregnant  Patient joined a facebook group that told her what could happen with the IUD, and now she is considering a tubal ligation  She is not positive today if she wants the IUD or not  Patient Active Problem List   Diagnosis    Intractable migraine    Anxiety    HTN (hypertension)    Status post laparoscopic sleeve gastrectomy    Hypothyroidism    Marijuana abuse    Hyperlipemia, mixed    Hypokalemia    Incomplete RBBB    Psoriasis    Sleep apnea    Vitamin D deficiency    Intractable vomiting with nausea    Hypertensive urgency       Gynecologic History  Patient's last menstrual period was 04/22/2018 (exact date)  Contraception: none    The following portions of the patient's history were reviewed and updated as appropriate: allergies, current medications, past family history, past medical history, past social history, past surgical history and problem list     Review of Systems  Pertinent items are noted in HPI  Objective    /82   Wt 103 kg (228 lb)   LMP 04/22/2018 (Exact Date)   Breastfeeding? No   BMI 39 14 kg/m²    GEN: The patient was alert and oriented x3, pleasant well-appearing female in no acute distress

## 2018-07-30 ENCOUNTER — ANNUAL EXAM (OUTPATIENT)
Dept: OBGYN CLINIC | Facility: CLINIC | Age: 43
End: 2018-07-30
Payer: COMMERCIAL

## 2018-07-30 VITALS
SYSTOLIC BLOOD PRESSURE: 126 MMHG | HEIGHT: 64 IN | DIASTOLIC BLOOD PRESSURE: 78 MMHG | BODY MASS INDEX: 37.9 KG/M2 | WEIGHT: 222 LBS

## 2018-07-30 DIAGNOSIS — Z01.419 WELL FEMALE EXAM WITH ROUTINE GYNECOLOGICAL EXAM: Primary | ICD-10-CM

## 2018-07-30 DIAGNOSIS — Z12.31 ENCOUNTER FOR SCREENING MAMMOGRAM FOR MALIGNANT NEOPLASM OF BREAST: ICD-10-CM

## 2018-07-30 DIAGNOSIS — Z12.4 SCREENING FOR MALIGNANT NEOPLASM OF CERVIX: ICD-10-CM

## 2018-07-30 DIAGNOSIS — Z11.3 SCREENING EXAMINATION FOR STD (SEXUALLY TRANSMITTED DISEASE): ICD-10-CM

## 2018-07-30 PROCEDURE — 99396 PREV VISIT EST AGE 40-64: CPT | Performed by: OBSTETRICS & GYNECOLOGY

## 2018-07-30 NOTE — PROGRESS NOTES
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     1   Routine well woman exam done today  2  Pap and HPV:  The patient's last pap was 2017(LSIL with DOMO 3 on colpo  LEEP was DOMO 1  Pap and cotesting was done today  Current ASCCP Guidelines reviewed  3   Mammogram ordered  4  The following were reviewed in today's visit: breast self exam, mammography screening ordered, family planning choices, adequate intake of calcium and vitamin D, exercise and healthy diet  5  Contraception: Desires bilateral salpingectomy  Counseled today  Would like to schedule anytime after September  CC:  Annual Gynecologic Examination    HPI: 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  She has the following concerns:  none    Health Maintenance:    She exercises 1 days per week with cardio  She wears her seatbelt routinely  She does perform regular monthly self breast exams  She feels safe at home  Patients does not follow a particular diet        Her last pap was LSIL in 2017  Last mammogram: none    Past Medical History:   Diagnosis Date    Abnormal ECG Dec 2015    Interventricular conduction relay disorder    Abnormal Pap smear of cervix Jan 2015    See medical records from Dr Vonnie Bonds Anesthesia complication     Anxiety     Arthritis     hands    Disease of thyroid gland     hypo    HPV (human papilloma virus) infection Jan 2015    See medical records from Dr Vonnie Bonds HTN (hypertension)     Nausea 12/2015    since gastric sleeve    Prediabetes     Urinary tract infection     chronic issue    Varicella March 1985       Past Surgical History:   Procedure Laterality Date    BARIATRIC SURGERY  Dec 2015    Gastric Sleeve    CERVICAL BIOPSY N/A 3/2/2017    Procedure: BIOPSY LEEP CERVIX;  Surgeon: Anai Murillo MD;  Location: [address] [place] [address];  Service:    Vito Dumas CERVICAL BIOPSY  W/ LOOP ELECTRODE EXCISION  Mar 2017    See medical records from Dr Ash Ramos   [address] [facility]  2015    sleeve at age 45    TONSILLECTOMY      adenoids-age 35    WISDOM TOOTH EXTRACTION      x4-age 18       Past OB/Gyn History:  OB History      Para Term  AB Living    0 0 0 0 0 0    SAB TAB Ectopic Multiple Live Births    0 0 0 0 0           Patient's last menstrual period was 2018 (exact date)  Period Cycle (Days): 45 (45-60 days)  Period Duration (Days): 5 days   Period Pattern: (!) Irregular  Menstrual Flow: Heavy  Menstrual Control: Tampon, Maxi pad, Hospital pad  Dysmenorrhea: (!) Severe  Dysmenorrhea Symptoms: Cramping, Nausea, Diarrhea, Headache (stabbing , sharp pains, facial acne, smith/crying episodes  )   History of sexually transmitted infection trichomonas twice, HPV  History of abnormal pap smears  Yes     Patient is currently sexually active  heterosexual Birth control: condoms  Family History   Problem Relation Age of Onset    Diabetes Mother         diet controlled    Diabetes Father         insulin    Other Father         agent orange       Social History:  Social History     Social History    Marital status: /Civil Union     Spouse name: N/A    Number of children: N/A    Years of education: N/A     Occupational History    Not on file  Social History Main Topics    Smoking status: Former Smoker     Packs/day: 0 25     Years: 10 00     Quit date:     Smokeless tobacco: Never Used    Alcohol use 3 6 oz/week     4 Standard drinks or equivalent per week    Drug use: Yes     Frequency: 7 0 times per week     Types: Marijuana      Comment: last smoked today    Sexual activity: Yes     Partners: Male     Birth control/ protection: Condom Male     Other Topics Concern    Not on file     Social History Narrative    No narrative on file     Presently lives with   Patient is     Patient is currently unemployed     Allergies   Allergen Reactions    [place] (Diagnostic) Anaphylaxis     All stone nuts  Lips/tongue swell-Birch Tree allergy    Bee Venom Anaphylaxis     All over swelling/pain/redness    Soy Lecithin [Lecithin] Anaphylaxis     Togue/throat swelling-dyspnea       Current Outpatient Prescriptions:     BIOTIN PO, Take 30,000 mcg by mouth every morning, Disp: , Rfl:     Cetirizine HCl (ZYRTEC ALLERGY PO), Take 20 mg by mouth every morning, Disp: , Rfl:     levothyroxine 100 mcg tablet, Take 125 mcg by mouth every morning  , Disp: , Rfl:     ondansetron (ZOFRAN) 4 mg tablet, Take 1 tablet (4 mg total) by mouth every 8 (eight) hours as needed for nausea or vomiting May take 2 tabs if needed, Disp: 20 tablet, Rfl: 0    PARoxetine (PAXIL) 30 mg tablet, Take 1 tablet (30 mg total) by mouth every morning, Disp: 30 tablet, Rfl: 3    Review of Systems:  A complete review of systems was performed and was negative, except as listed  none    Physical Exam:  /78   Ht 5' 4" (1 626 m)   Wt 101 kg (222 lb)   LMP 07/11/2018 (Exact Date)   Breastfeeding? No   BMI 38 11 kg/m²    GEN: The patient was alert and oriented x3, pleasant well-appearing female in no acute distress  HEENT:  Unremarkable, no anterior or posterior lymphadenopathy, no thyromegaly  CV:  RRR, no murmurs  RESP:  Clear to auscultation bilaterally  BREAST:  Symmetric breasts with no palpable breast masses or obvious breast lesions  She has no retractions or nipple discharge  She has no axillary abnormalities or palpable masses  Self breast exam is taught  ABD:  Soft, nontender, nondistended, normoactive bowel sounds,   EXT:  WWP, nontender, no edema  BACK:  No CVA tenderness, no tenderness to palpation along spine  PELVIC:  Normal appearing external female genitalia, normal vaginal epithelium, No discharge  Cervix present   Bimanual: absent CMT,  normal uterus, non-tender  No palpable adnexal masses

## 2018-08-07 LAB
C TRACH RRNA CVX QL NAA+PROBE: NEGATIVE
CYTOLOGIST CVX/VAG CYTO: NORMAL
DX ICD CODE: NORMAL
HPV I/H RISK 1 DNA CVX QL PROBE+SIG AMP: NEGATIVE
N GONORRHOEA RRNA CVX QL NAA+PROBE: NEGATIVE
OTHER STN SPEC: NORMAL
PATH REPORT.FINAL DX SPEC: NORMAL
SL AMB NOTE:: NORMAL
SL AMB SPECIMEN ADEQUACY: NORMAL
SL AMB TEST METHODOLOGY: NORMAL

## 2018-08-10 DIAGNOSIS — F41.9 ANXIETY: ICD-10-CM

## 2018-08-12 RX ORDER — PAROXETINE 30 MG/1
30 TABLET, FILM COATED ORAL EVERY MORNING
Qty: 30 TABLET | Refills: 5 | Status: SHIPPED | OUTPATIENT
Start: 2018-08-12 | End: 2019-02-28 | Stop reason: SDUPTHER

## 2018-09-17 DIAGNOSIS — E03.9 HYPOTHYROIDISM, UNSPECIFIED TYPE: Primary | ICD-10-CM

## 2018-09-18 RX ORDER — LEVOTHYROXINE SODIUM 112 UG/1
112 TABLET ORAL EVERY MORNING
Qty: 90 TABLET | Refills: 3 | Status: SHIPPED | OUTPATIENT
Start: 2018-09-18 | End: 2019-10-12 | Stop reason: SDUPTHER

## 2018-09-24 ENCOUNTER — TELEPHONE (OUTPATIENT)
Dept: OBGYN CLINIC | Facility: CLINIC | Age: 43
End: 2018-09-24

## 2018-09-24 NOTE — TELEPHONE ENCOUNTER
Left vm for pt to call office regarding if she was aware of her recent pap smear results  Neg pap and neg hpv  Pt does have Mychart not sure if pt was already given results

## 2018-09-27 ENCOUNTER — OFFICE VISIT (OUTPATIENT)
Dept: OBGYN CLINIC | Facility: CLINIC | Age: 43
End: 2018-09-27

## 2018-09-27 VITALS
SYSTOLIC BLOOD PRESSURE: 150 MMHG | BODY MASS INDEX: 37.73 KG/M2 | HEIGHT: 64 IN | DIASTOLIC BLOOD PRESSURE: 94 MMHG | WEIGHT: 221 LBS

## 2018-09-27 DIAGNOSIS — Z01.818 PREOP EXAMINATION: Primary | ICD-10-CM

## 2018-09-27 PROCEDURE — 99024 POSTOP FOLLOW-UP VISIT: CPT | Performed by: OBSTETRICS & GYNECOLOGY

## 2018-09-27 RX ORDER — ASCORBIC ACID 125 MG
5000 TABLET,CHEWABLE ORAL DAILY
COMMUNITY

## 2018-10-01 ENCOUNTER — PREP FOR PROCEDURE (OUTPATIENT)
Dept: OBGYN CLINIC | Facility: CLINIC | Age: 43
End: 2018-10-01

## 2018-10-01 DIAGNOSIS — Z30.2 ADMISSION FOR STERILIZATION: Primary | ICD-10-CM

## 2018-10-01 RX ORDER — SODIUM CHLORIDE, SODIUM LACTATE, POTASSIUM CHLORIDE, CALCIUM CHLORIDE 600; 310; 30; 20 MG/100ML; MG/100ML; MG/100ML; MG/100ML
125 INJECTION, SOLUTION INTRAVENOUS CONTINUOUS
Status: CANCELLED | OUTPATIENT
Start: 2018-10-01

## 2018-10-01 NOTE — PROGRESS NOTES
Assessment /Plan:     44 y o  [place] Grise with Patient's last menstrual period was 09/26/2018  with desire for permanent sterilization  for bilateral salpingectomy   The risks (infection, bleeding, transfusion, damage to adjacent organs requiring immediate and/or delayed repair, clots inside blood vessels, need to complete procedure using alternative approach, procedural failure, worsening of pre-exisiting medical condition, and death) and alternatives  have been discussed and patient desires to proceed with bilateral salpingectomy at [facility] on 10/23/18  Consent was reviewed in detail and signed today  Preoperative testing and antibiotics were discussed  Patient will visit her Medicine doctor prior to surgery because of her hypertension  Postoperative course discussed and post op medications were reviewed       HPI:  Pt is a 44 y o  [place] Grise with Patient's last menstrual period was 09/26/2018  using nothing for contraception presents c/o desires permanent sterilization  Patient declines any other forms of birth control  Was initially thinking of the IUD, but then decided that she would like permanent sterilization  She is aware that this will not affect her periods        PMHx:   Past Medical History:   Diagnosis Date    Abnormal ECG Dec 2015    Interventricular conduction relay disorder    Abnormal Pap smear of cervix Jan 2015    See medical records from Dr Zara Frey Anesthesia complication     Anxiety     Arthritis     hands    Disease of thyroid gland     hypo    HPV (human papilloma virus) infection Jan 2015    See medical records from Dr Zara Frey HTN (hypertension)     Nausea 12/2015    since gastric sleeve    Prediabetes     Urinary tract infection     chronic issue    Varicella March 1985       PSHx:   Past Surgical History:   Procedure Laterality Date    BARIATRIC SURGERY  Dec 2015    Gastric Sleeve    CERVICAL BIOPSY N/A 3/2/2017    Procedure: BIOPSY LEEP CERVIX;  Surgeon: Thalia Aquino June Raymond MD;  Location: [address] [place] [address];  Service:     CERVICAL BIOPSY  W/ LOOP ELECTRODE EXCISION  Mar 2017    See medical records from Dr Agustin Bryant ESOPHAGOGASTRODUODENOSCOPY     [address]  2015    sleeve at age 45    TONSILLECTOMY      adenoids-age 35    WISDOM TOOTH EXTRACTION      x4-age 18       Meds:   (Not in a hospital admission)    Allergies: Allergies   Allergen Reactions    [place] (Diagnostic) Anaphylaxis     All stone nuts  Lips/tongue swell-Birch Tree allergy    Bee Venom Anaphylaxis     All over swelling/pain/redness    Soy Lecithin [Lecithin] Anaphylaxis     Togue/throat swelling-dyspnea       Social Hx:    Social History     Social History    Marital status: /Civil Union     Spouse name: N/A    Number of children: N/A    Years of education: N/A     Social History Main Topics    Smoking status: Former Smoker     Packs/day: 0 25     Years: 10 00     Quit date:     Smokeless tobacco: Never Used    Alcohol use 3 6 oz/week     4 Standard drinks or equivalent per week    Drug use: Yes     Frequency: 7 0 times per week     Types: Marijuana      Comment: last smoked today    Sexual activity: Yes     Partners: Male     Birth control/ protection: Condom Male     Other Topics Concern    None     Social History Narrative    None       Ob Hx:   OB History    Para Term  AB Living   0 0 0 0 0 0   SAB TAB Ectopic Multiple Live Births   0 0 0 0 0             Gyn HX:  abnormal pap    Fm Hx:   Family History   Problem Relation Age of Onset    Diabetes Mother         diet controlled    Diabetes Father         insulin    Other Father         agent orange       ROS:  Review of Systems    VS:  /94   Ht 5' 4" (1 626 m)   Wt 100 kg (221 lb)   LMP 2018   BMI 37 93 kg/m²       Exam:    Physical Exam   Constitutional: She is oriented to person, place, and time  She appears well-developed and well-nourished     HENT:   Head: Normocephalic and atraumatic  Neck: Normal range of motion  Neck supple  Cardiovascular: Normal rate, regular rhythm and normal heart sounds  Pulmonary/Chest: Effort normal and breath sounds normal    Abdominal: Soft  Bowel sounds are normal  She exhibits no distension and no mass  There is no tenderness  There is no rebound and no guarding  Genitourinary: Vagina normal and uterus normal    Musculoskeletal: Normal range of motion  Neurological: She is alert and oriented to person, place, and time  Skin: Skin is warm and dry  Psychiatric: She has a normal mood and affect

## 2018-10-03 ENCOUNTER — APPOINTMENT (EMERGENCY)
Dept: RADIOLOGY | Facility: HOSPITAL | Age: 43
End: 2018-10-03
Payer: COMMERCIAL

## 2018-10-03 ENCOUNTER — HOSPITAL ENCOUNTER (EMERGENCY)
Facility: HOSPITAL | Age: 43
Discharge: HOME | End: 2018-10-04
Attending: EMERGENCY MEDICINE | Admitting: EMERGENCY MEDICINE
Payer: COMMERCIAL

## 2018-10-03 VITALS
DIASTOLIC BLOOD PRESSURE: 106 MMHG | RESPIRATION RATE: 28 BRPM | SYSTOLIC BLOOD PRESSURE: 189 MMHG | HEART RATE: 94 BPM | TEMPERATURE: 98.6 F | OXYGEN SATURATION: 96 %

## 2018-10-03 DIAGNOSIS — M54.50 ACUTE BILATERAL LOW BACK PAIN WITHOUT SCIATICA: Primary | ICD-10-CM

## 2018-10-03 PROCEDURE — 99283 EMERGENCY DEPT VISIT LOW MDM: CPT

## 2018-10-03 PROCEDURE — 96372 THER/PROPH/DIAG INJ SC/IM: CPT

## 2018-10-03 PROCEDURE — 72100 X-RAY EXAM L-S SPINE 2/3 VWS: CPT

## 2018-10-03 RX ORDER — OXYCODONE HYDROCHLORIDE AND ACETAMINOPHEN 5; 325 MG/1; MG/1
1 TABLET ORAL ONCE
Status: COMPLETED | OUTPATIENT
Start: 2018-10-03 | End: 2018-10-03

## 2018-10-03 RX ORDER — KETOROLAC TROMETHAMINE 30 MG/ML
30 INJECTION, SOLUTION INTRAMUSCULAR; INTRAVENOUS ONCE
Status: COMPLETED | OUTPATIENT
Start: 2018-10-03 | End: 2018-10-03

## 2018-10-03 RX ORDER — PREDNISONE 20 MG/1
20 TABLET ORAL ONCE
Status: COMPLETED | OUTPATIENT
Start: 2018-10-03 | End: 2018-10-03

## 2018-10-03 RX ORDER — PREDNISONE 10 MG/1
20 TABLET ORAL DAILY
Qty: 10 TABLET | Refills: 0 | Status: SHIPPED | OUTPATIENT
Start: 2018-10-03 | End: 2018-10-08

## 2018-10-03 RX ORDER — CYCLOBENZAPRINE HCL 10 MG
10 TABLET ORAL 2 TIMES DAILY PRN
Qty: 10 TABLET | Refills: 0 | Status: SHIPPED | OUTPATIENT
Start: 2018-10-03 | End: 2018-10-17

## 2018-10-03 RX ADMIN — PREDNISONE 20 MG: 20 TABLET ORAL at 22:37

## 2018-10-03 RX ADMIN — OXYCODONE HYDROCHLORIDE AND ACETAMINOPHEN 1 TABLET: 5; 325 TABLET ORAL at 22:37

## 2018-10-03 RX ADMIN — KETOROLAC TROMETHAMINE 30 MG: 30 INJECTION, SOLUTION INTRAMUSCULAR at 22:38

## 2018-10-04 ENCOUNTER — TELEPHONE (OUTPATIENT)
Dept: FAMILY MEDICINE CLINIC | Facility: CLINIC | Age: 43
End: 2018-10-04

## 2018-10-04 DIAGNOSIS — M54.5 ACUTE LOW BACK PAIN, UNSPECIFIED BACK PAIN LATERALITY, WITH SCIATICA PRESENCE UNSPECIFIED: Primary | ICD-10-CM

## 2018-10-04 PROBLEM — M54.50 ACUTE LOW BACK PAIN: Status: ACTIVE | Noted: 2018-10-04

## 2018-10-04 RX ORDER — IBUPROFEN 800 MG/1
800 TABLET ORAL EVERY 6 HOURS PRN
Qty: 20 TABLET | Refills: 0 | Status: SHIPPED | OUTPATIENT
Start: 2018-10-04 | End: 2022-03-31

## 2018-10-04 NOTE — ED PROVIDER NOTES
History  Chief Complaint   Patient presents with    Back Pain     states a couple of days ago pulled a muscle in her back painting and sanding  states it started in her back and hip  now feels out of balance when walking     45-year-old female states she was painting in his spare room at home and pulled muscles in her right lower back  She states since that time it has progressively gotten worse and now other muscles along paravertebral spine up and down her back a causing her to have spasm and pain  Patient states she is unable to walk well and keep her balance when this happens and she ends up falling down  Patient does not complain of any other neurological deficits  No bowel or bladder dysfunction        History provided by:  Patient   used: No        Prior to Admission Medications   Prescriptions Last Dose Informant Patient Reported? Taking?    BIOTIN PO  Self Yes No   Sig: Take 30,000 mcg by mouth every morning   Cetirizine HCl (ZYRTEC ALLERGY PO)  Self Yes No   Sig: Take 20 mg by mouth every morning   Cyanocobalamin (B-12) 5000 MCG CAPS   Yes No   Sig: Take 5,000 mcg by mouth daily   PARoxetine (PAXIL) 30 mg tablet   No No   Sig: Take 1 tablet (30 mg total) by mouth every morning   levothyroxine 112 mcg tablet   No No   Sig: Take 1 tablet (112 mcg total) by mouth every morning   ondansetron (ZOFRAN) 4 mg tablet  Self No No   Sig: Take 1 tablet (4 mg total) by mouth every 8 (eight) hours as needed for nausea or vomiting May take 2 tabs if needed      Facility-Administered Medications: None       Past Medical History:   Diagnosis Date    Abnormal ECG Dec 2015    Interventricular conduction relay disorder    Abnormal Pap smear of cervix Jan 2015    See medical records from Dr Libra Kelly Anesthesia complication     Anxiety     Arthritis     hands    Disease of thyroid gland     hypo    HPV (human papilloma virus) infection Jan 2015    See medical records from Dr Libra Kelly HTN (hypertension)     Nausea 12/2015    since gastric sleeve    Prediabetes     Urinary tract infection     chronic issue    Varicella March 1985       Past Surgical History:   Procedure Laterality Date    BARIATRIC SURGERY  Dec 2015    Gastric Sleeve    CERVICAL BIOPSY N/A 3/2/2017    Procedure: BIOPSY LEEP CERVIX;  Surgeon: Edward Prakash MD;  Location: [facility];  Service:     CERVICAL BIOPSY  W/ LOOP ELECTRODE EXCISION  Mar 2017    See medical records from Dr Angle Kebede ESOPHAGOGASTRODUODENOSCOPY     [address]  12/2015    sleeve at age 45    TONSILLECTOMY      adenoids-age 35    WISDOM TOOTH EXTRACTION      x4-age 18       Family History   Problem Relation Age of Onset    Diabetes Mother         diet controlled    Diabetes Father         insulin    Other Father         agent orange     I have reviewed and agree with the history as documented  Social History   Substance Use Topics    Smoking status: Former Smoker     Packs/day: 0 25     Years: 10 00     Quit date: 2004    Smokeless tobacco: Never Used    Alcohol use 3 6 oz/week     4 Standard drinks or equivalent per week        Review of Systems   Constitutional: Negative for activity change, chills, diaphoresis and fever  HENT: Negative for congestion, ear pain, nosebleeds, sore throat, trouble swallowing and voice change  Eyes: Negative for pain, discharge and redness  Respiratory: Negative for apnea, cough, choking, shortness of breath, wheezing and stridor  Cardiovascular: Negative for chest pain and palpitations  Gastrointestinal: Negative for abdominal distention, abdominal pain, constipation, diarrhea, nausea and vomiting  Endocrine: Negative for polydipsia  Genitourinary: Negative for difficulty urinating, dysuria, flank pain, frequency, hematuria and urgency  Musculoskeletal: Positive for back pain and myalgias  Negative for gait problem, joint swelling, neck pain and neck stiffness     Skin: Negative for pallor and rash  Neurological: Negative for dizziness, tremors, syncope, speech difficulty, weakness, numbness and headaches  Hematological: Negative for adenopathy  Psychiatric/Behavioral: Negative for confusion, hallucinations, self-injury and suicidal ideas  The patient is not nervous/anxious  Physical Exam  Physical Exam   Constitutional: She is oriented to person, place, and time  Vital signs are normal  She appears well-developed and well-nourished  HENT:   Head: Normocephalic and atraumatic  Right Ear: External ear normal    Left Ear: External ear normal    Nose: Nose normal    Mouth/Throat: Oropharynx is clear and moist    Eyes: Pupils are equal, round, and reactive to light  Conjunctivae and EOM are normal    Neck: Normal range of motion  Neck supple  Cardiovascular: Normal rate, regular rhythm, normal heart sounds and intact distal pulses  Pulmonary/Chest: Effort normal and breath sounds normal    Abdominal: Soft  Bowel sounds are normal    Musculoskeletal: She exhibits tenderness  Patient has tenderness in the lumbar paravertebral musculature particularly on the right side  Neurological: She is alert and oriented to person, place, and time  Skin: Skin is warm  Psychiatric: She has a normal mood and affect  Nursing note and vitals reviewed        Vital Signs  ED Triage Vitals [10/03/18 2136]   Temperature Pulse Respirations Blood Pressure SpO2   98 6 °F (37 °C) 94 (!) 28 (!) 189/106 96 %      Temp Source Heart Rate Source Patient Position - Orthostatic VS BP Location FiO2 (%)   Tympanic Monitor Sitting Right arm --      Pain Score       Worst Possible Pain           Vitals:    10/03/18 2136   BP: (!) 189/106   Pulse: 94   Patient Position - Orthostatic VS: Sitting       Visual Acuity      ED Medications  Medications   oxyCODONE-acetaminophen (PERCOCET) 5-325 mg per tablet 1 tablet (1 tablet Oral Given 10/3/18 2237)   ketorolac (TORADOL) injection 30 mg (30 mg Intramuscular Given 10/3/18 2238)   predniSONE tablet 20 mg (20 mg Oral Given 10/3/18 2237)       Diagnostic Studies  Results Reviewed     None                 XR lumbar spine 2 or 3 views    (Results Pending)              Procedures  Procedures       Phone Contacts  ED Phone Contact    ED Course                               [facility]  CritCare Time    Disposition  Final diagnoses:   Acute bilateral low back pain without sciatica     Time reflects when diagnosis was documented in both MDM as applicable and the Disposition within this note     Time User Action Codes Description Comment    10/3/2018 11:46 PM Maryclare Rash Add [M54 5] Acute bilateral low back pain without sciatica       ED Disposition     ED Disposition Condition Comment    Discharge  Aminta Moran discharge to home/self care  Condition at discharge: Stable        Follow-up Information     Follow up With Specialties Details Why Αμαλίας MD [facility], Family Medicine Schedule an appointment as soon as possible for a visit As needed [address]  [address] [facility]  [phone]            Patient's Medications   Discharge Prescriptions    CYCLOBENZAPRINE (FLEXERIL) 10 MG TABLET    Take 1 tablet (10 mg total) by mouth 2 (two) times a day as needed for muscle spasms       Start Date: 10/3/2018 End Date: --       Order Dose: 10 mg       Quantity: 10 tablet    Refills: 0    PREDNISONE 10 MG TABLET    Take 2 tablets (20 mg total) by mouth daily for 5 days       Start Date: 10/3/2018 End Date: 10/8/2018       Order Dose: 20 mg       Quantity: 10 tablet    Refills: 0     No discharge procedures on file      ED Provider  Electronically Signed by           Ilana Cramer DO  10/03/18 4570

## 2018-10-04 NOTE — TELEPHONE ENCOUNTER
I sent in some 800 mg ibuprofen for her   If she doesn't improve will need follow up in the office with Dr Rosendo Schlatter, DO

## 2018-10-04 NOTE — TELEPHONE ENCOUNTER
Patient was in the Er last night for back pain  She typically sees dr Guerrero Blend  She is asking for something for pain  NOT an opiate, but an ibuphrophen 800? Please advise   Shop [facility]

## 2018-10-04 NOTE — TELEPHONE ENCOUNTER
Pt aware and was very happy, will call if she does not improve for a follow up  No further action required

## 2018-10-04 NOTE — DISCHARGE INSTRUCTIONS
Acute Low Back Pain, Ambulatory Care   GENERAL INFORMATION:   Acute low back pain  is discomfort in your lower back area that lasts for less than 12 weeks  The word acute is used to describe pain that starts suddenly, worsens quickly, and lasts for a short time  Common symptoms include the following:   · Back stiffness or spasms    · Pain down the back or side of one leg    · Holding yourself in an unusual position or posture to decrease your back pain    · Not being able to find a sitting position that is comfortable    · Slow increase in your pain for 24 to 48 hours after you stress your back    · Tenderness on your lower back or severe pain when you move your back  Seek immediate care for the following symptoms:   · Severe pain    · Sudden stiffness and heaviness in both buttocks down to both legs    · Numbness or weakness in one leg, or pain in both legs    · Numbness in your genital area or across your lower back    · Unable to control your urine or bowel movements  Treatment for acute low back pain  may include any of the following:  · Medicines:      ¨ NSAIDs  help decrease swelling and pain or fever  This medicine is available with or without a doctor's order  NSAIDs can cause stomach bleeding or kidney problems in certain people  If you take blood thinner medicine, always ask your healthcare provider if NSAIDs are safe for you  Always read the medicine label and follow directions  ¨ Muscle relaxers  help decrease muscle spasms pain  ¨ Prescription pain medicine  may be given  Ask how to take this medicine safely  · Surgery  may be needed if your pain is severe and other treatments do not work  Surgery may be needed for conditions of the lumbar spine, such as herniated disc or spinal stenosis  Manage your symptoms:   · Sleep on a firm mattress  If you do not have a firm mattress, have someone move your mattress to the floor for a few days   A piece of plywood under your mattress can also help make it firmer  · Apply ice  on your lower back for 15 to 20 minutes every hour or as directed  Use an ice pack, or put crushed ice in a plastic bag  Cover it with a towel  Ice helps prevent tissue damage and decreases swelling and pain  You can alternate ice and heat  · Apply heat  on your lower back for 20 to 30 minutes every 2 hours for as many days as directed  Heat helps decrease pain and muscle spasms  · Go to physical therapy  A physical therapist teaches you exercises to help improve movement and strength, and to decrease pain  Prevent acute low back pain:   · Use proper body mechanics  ¨ Bend at the hips and knees when you  objects  Do not bend from the waist  Use your leg muscles as you lift the load  Do not use your back  Keep the object close to your chest as you lift it  Try not to twist or lift anything above your waist     ¨ Change your position often when you stand for long periods of time  Rest one foot on a small box or footrest, and then switch to the other foot often  ¨ Try not to sit for long periods of time  When you do, sit in a straight-backed chair with your feet flat on the floor  Never reach, pull, or push while you are sitting  · Exercise regularly  Warm up before you exercise  Do exercises that strengthen your back muscles  Ask about the best exercise plan for you  · Maintain a healthy weight  Ask your healthcare provider how much you should weigh  Ask him to help you create a weight loss plan if you are overweight  Follow up with your healthcare provider as directed:  Return for a follow-up visit if you still have pain after 1 to 3 weeks of treatment  You may need to visit an orthopedist if your back pain lasts more than 6 to 12 weeks  Write down your questions so you remember to ask them during your visits  CARE AGREEMENT:   You have the right to help plan your care  Learn about your health condition and how it may be treated   Discuss treatment options with your caregivers to decide what care you want to receive  You always have the right to refuse treatment  The above information is an  only  It is not intended as medical advice for individual conditions or treatments  Talk to your doctor, nurse or pharmacist before following any medical regimen to see if it is safe and effective for you  © 2014 [address] is for End User's use only and may not be sold, redistributed or otherwise used for commercial purposes  All illustrations and images included in CareNotes® are the copyrighted property of A D A M , Inc  or Osmar Salazar

## 2018-10-10 ENCOUNTER — ANESTHESIA EVENT (OUTPATIENT)
Dept: PERIOP | Facility: AMBULARY SURGERY CENTER | Age: 43
End: 2018-10-10

## 2018-10-23 ENCOUNTER — ANESTHESIA (OUTPATIENT)
Dept: PERIOP | Facility: AMBULARY SURGERY CENTER | Age: 43
End: 2018-10-23

## 2018-10-23 ENCOUNTER — TELEPHONE (OUTPATIENT)
Dept: FAMILY MEDICINE CLINIC | Facility: CLINIC | Age: 43
End: 2018-10-23

## 2018-10-23 ENCOUNTER — OFFICE VISIT (OUTPATIENT)
Dept: FAMILY MEDICINE CLINIC | Facility: CLINIC | Age: 43
End: 2018-10-23
Payer: COMMERCIAL

## 2018-10-23 VITALS
WEIGHT: 220.8 LBS | BODY MASS INDEX: 37.69 KG/M2 | HEIGHT: 64 IN | RESPIRATION RATE: 20 BRPM | TEMPERATURE: 98.3 F | DIASTOLIC BLOOD PRESSURE: 124 MMHG | SYSTOLIC BLOOD PRESSURE: 198 MMHG | HEART RATE: 78 BPM

## 2018-10-23 DIAGNOSIS — R73.01 IMPAIRED FASTING GLUCOSE: ICD-10-CM

## 2018-10-23 DIAGNOSIS — E78.2 HYPERLIPEMIA, MIXED: ICD-10-CM

## 2018-10-23 DIAGNOSIS — E03.9 HYPOTHYROIDISM, UNSPECIFIED TYPE: ICD-10-CM

## 2018-10-23 DIAGNOSIS — I10 ESSENTIAL HYPERTENSION: Primary | ICD-10-CM

## 2018-10-23 PROCEDURE — 99214 OFFICE O/P EST MOD 30 MIN: CPT | Performed by: NURSE PRACTITIONER

## 2018-10-23 RX ORDER — OLMESARTAN MEDOXOMIL, AMLODIPINE AND HYDROCHLOROTHIAZIDE TABLET 20/5/12.5 MG 20; 5; 12.5 MG/1; MG/1; MG/1
1 TABLET ORAL DAILY
Qty: 30 TABLET | Refills: 1 | Status: SHIPPED | OUTPATIENT
Start: 2018-10-23 | End: 2018-12-21 | Stop reason: SDUPTHER

## 2018-10-23 NOTE — ASSESSMENT & PLAN NOTE
Uncontrolled  Pt reports having taken medications approx 5-6 years ago and has been off since  Reviewed previous VS, BP has consistently been 150s/100s for the past couple of years  Will start on Tribenzor and labs ordered  Pt refused ER

## 2018-10-23 NOTE — ANESTHESIA PREPROCEDURE EVALUATION
Review of Systems/Medical History  Patient summary reviewed  Chart reviewed  History of anesthetic complications     Cardiovascular  Hyperlipidemia, Hypertension controlled, Dysrhythmias ,   Comment: Incomplete RBBB,  Pulmonary  Smoker ex-smoker  , Sleep apnea ,   Comment: Marijuana use     GI/Hepatic    Bariatric surgery,        Negative  ROS        Endo/Other  History of thyroid disease , hypothyroidism,   Obesity    GYN  Negative gynecology ROS          Hematology   Musculoskeletal    Arthritis     Neurology    Headaches,    Psychology   Anxiety,              Physical Exam    Airway    Mallampati score: II  TM Distance: >3 FB  Neck ROM: full     Dental   No notable dental hx     Cardiovascular  Rhythm: regular, Rate: normal, Cardiovascular exam normal    Pulmonary  Pulmonary exam normal Breath sounds clear to auscultation,     Other Findings        Anesthesia Plan  ASA Score- 2     Anesthesia Type- general with ASA Monitors  Additional Monitors:   Airway Plan: LMA  Plan Factors-    Induction- intravenous  Postoperative Plan- Plan for postoperative opioid use  Informed Consent- Anesthetic plan and risks discussed with patient  I personally reviewed this patient with the CRNA  Discussed and agreed on the Anesthesia Plan with the CRNA  Gerhardt Sep Recent labs personally reviewed:  Lab Results   Component Value Date    WBC 6 50 04/15/2018    HGB 12 0 04/15/2018     04/15/2018     Lab Results   Component Value Date     04/15/2018    K 3 0 (L) 04/15/2018    BUN 6 04/15/2018    CREATININE 0 67 04/15/2018    GLUCOSE 114 (H) 12/29/2017     Lab Results   Component Value Date    PTT 25 04/01/2018      Lab Results   Component Value Date    INR 0 98 04/14/2018     Lab Results   Component Value Date    HGBA1C 6 0 (H) 01/13/2017       I, Halle Sin MD, have personally seen and evaluated the patient prior to anesthetic care    I have reviewed the pre-anesthetic record, and other medical records if appropriate to the anesthetic care  If a CRNA is involved in the case, I have reviewed the CRNA assessment, if present, and agree  Risks/benefits and alternatives discussed with patient including possible PONV, sore throat, and possibility of rare anesthetic and surgical emergencies

## 2018-10-23 NOTE — TELEPHONE ENCOUNTER
Spoke with pt  Regarding high BP, pt states has many issues going on at home, is very stressed and  Also nervous about surgery    Coming in for appointment with Shar Darden today 10/23/2018   Heather Moses

## 2018-10-23 NOTE — PROGRESS NOTES
Assessment/Plan:    Refused ER for severe hypertension  Advised on risk for CVA with uncontrolled HTN  Will start on Tribenzor and labs ordered  RTO in 2 days for BP check  Pt to self monitor at home and was advised to go to the ER if her BP climbs any higher    Problem List Items Addressed This Visit        Endocrine    Hypothyroidism     Last TSH borderline high  Repeat labs ordered         Relevant Orders    TSH, 3rd generation    T4, free    Impaired fasting glucose     Fasting glucose in July was in the 170s  Labs ordered         Relevant Orders    Hemoglobin A1C       Cardiovascular and Mediastinum    Essential hypertension - Primary     Uncontrolled  Pt reports having taken medications approx 5-6 years ago and has been off since  Reviewed previous VS, BP has consistently been 150s/100s for the past couple of years  Will start on Tribenzor and labs ordered  Pt refused ER  Relevant Medications    Olmesartan-Amlodipine-HCTZ 20-5-12 5 MG TABS    Other Relevant Orders    CBC and differential    Comprehensive metabolic panel       Other    Hyperlipemia, mixed    Relevant Orders    Lipid panel          There are no Patient Instructions on file for this visit  Return in about 2 days (around 10/25/2018)  Subjective:      Patient ID: Winnie Mc is a 44 y o  female  Chief Complaint   Patient presents with    Hypertension     blood pressure was [facility] at [facility] before surgery  [facility] lpn    Insomnia       She was scheduled for an elective tubal ligation this morning and her BP was high, so surgery was cancelled  She doesn't understand why it is so high  She has been under a lot of stress and didn't sleep at all last night  She was on Lisinopril about 7-8 years ago and was on the lowest dose possible, which caused her to pass out  States her BP has been well controlled since then      Denies any headaches, visual disturbance, chest pain, SOB, or LE edema  Significant family history for heart disease on both sides of her family  Lost 100 pounds a few years ago and eats between 700-1000 calories per day  Does not exercise regularly  The following portions of the patient's history were reviewed and updated as appropriate: allergies, current medications, past family history, past medical history, past social history, past surgical history and problem list     Review of Systems   Constitutional: Negative for chills, fatigue and fever  Respiratory: Negative for cough, shortness of breath and wheezing  Cardiovascular: Negative for chest pain, palpitations and leg swelling  Gastrointestinal: Negative for abdominal pain, diarrhea, nausea and vomiting  Skin: Negative for rash  Neurological: Negative for dizziness and headaches  Psychiatric/Behavioral: Positive for agitation  The patient is nervous/anxious  Current Outpatient Prescriptions   Medication Sig Dispense Refill    BIOTIN PO Take 30,000 mcg by mouth every morning      Cetirizine HCl (ZYRTEC ALLERGY PO) Take 20 mg by mouth every morning      Cyanocobalamin (B-12) 5000 MCG CAPS Take 5,000 mcg by mouth daily      ibuprofen (MOTRIN) 800 mg tablet Take 1 tablet (800 mg total) by mouth every 6 (six) hours as needed for mild pain (take with food) 20 tablet 0    levothyroxine 112 mcg tablet Take 1 tablet (112 mcg total) by mouth every morning 90 tablet 3    ondansetron (ZOFRAN) 4 mg tablet Take 1 tablet (4 mg total) by mouth every 8 (eight) hours as needed for nausea or vomiting May take 2 tabs if needed 20 tablet 0    PARoxetine (PAXIL) 30 mg tablet Take 1 tablet (30 mg total) by mouth every morning 30 tablet 5    Olmesartan-Amlodipine-HCTZ 20-5-12 5 MG TABS Take 1 tablet by mouth daily 30 tablet 1     No current facility-administered medications for this visit        Facility-Administered Medications Ordered in Other Visits   Medication Dose Route Frequency Provider Last Rate Last Dose    lactated ringers infusion  125 mL/hr Intravenous Continuous Diandra Yu MD        lactated ringers infusion  125 mL/hr Intravenous Continuous Gustavo Atwood MD           Objective:    BP (!) 198/124   Pulse 78   Temp 98 3 °F (36 8 °C)   Resp 20   Ht 5' 4" (1 626 m)   Wt 100 kg (220 lb 12 8 oz)   LMP 09/26/2018   BMI 37 90 kg/m²        Physical Exam   Constitutional: She appears well-developed and well-nourished  HENT:   Head: Normocephalic and atraumatic  Right Ear: Tympanic membrane, external ear and ear canal normal    Left Ear: Tympanic membrane, external ear and ear canal normal    Nose: No mucosal edema or rhinorrhea  Mouth/Throat: Uvula is midline, oropharynx is clear and moist and mucous membranes are normal    Eyes: Conjunctivae are normal    Neck: Neck supple  No edema present  No thyromegaly present  Cardiovascular: Normal rate, regular rhythm, normal heart sounds and intact distal pulses  No murmur heard  Pulmonary/Chest: Effort normal and breath sounds normal    Abdominal: Bowel sounds are normal  She exhibits no distension  There is no splenomegaly or hepatomegaly  There is no tenderness  Lymphadenopathy:        Right cervical: No superficial cervical adenopathy present  Left cervical: No superficial cervical adenopathy present  Skin: Skin is warm, dry and intact  No rash noted  Psychiatric: She has a normal mood and affect  Nursing note and vitals reviewed               Regan Bernard

## 2018-10-24 ENCOUNTER — TELEPHONE (OUTPATIENT)
Dept: FAMILY MEDICINE CLINIC | Facility: CLINIC | Age: 43
End: 2018-10-24

## 2018-10-24 ENCOUNTER — OFFICE VISIT (OUTPATIENT)
Dept: FAMILY MEDICINE CLINIC | Facility: CLINIC | Age: 43
End: 2018-10-24
Payer: COMMERCIAL

## 2018-10-24 ENCOUNTER — TELEPHONE (OUTPATIENT)
Dept: OBGYN CLINIC | Facility: CLINIC | Age: 43
End: 2018-10-24

## 2018-10-24 VITALS
SYSTOLIC BLOOD PRESSURE: 138 MMHG | HEART RATE: 102 BPM | DIASTOLIC BLOOD PRESSURE: 100 MMHG | TEMPERATURE: 96.7 F | BODY MASS INDEX: 37.9 KG/M2 | RESPIRATION RATE: 20 BRPM | HEIGHT: 64 IN

## 2018-10-24 DIAGNOSIS — I10 ESSENTIAL HYPERTENSION: Primary | ICD-10-CM

## 2018-10-24 DIAGNOSIS — F41.9 ANXIETY: ICD-10-CM

## 2018-10-24 PROCEDURE — 99213 OFFICE O/P EST LOW 20 MIN: CPT | Performed by: NURSE PRACTITIONER

## 2018-10-24 RX ORDER — LORAZEPAM 0.5 MG/1
0.5 TABLET ORAL 2 TIMES DAILY
Qty: 30 TABLET | Refills: 0 | Status: SHIPPED | OUTPATIENT
Start: 2018-10-24 | End: 2019-03-28 | Stop reason: SDUPTHER

## 2018-10-24 RX ORDER — CLOBETASOL PROPIONATE 0.46 MG/ML
SOLUTION TOPICAL
Refills: 1 | COMMUNITY
Start: 2018-10-15 | End: 2019-10-28 | Stop reason: SDUPTHER

## 2018-10-24 RX ORDER — CLINDAMYCIN PHOSPHATE 10 UG/ML
LOTION TOPICAL
Refills: 1 | COMMUNITY
Start: 2018-10-15 | End: 2022-03-31

## 2018-10-24 RX ORDER — DOXYCYCLINE HYCLATE 100 MG/1
CAPSULE ORAL
Refills: 1 | COMMUNITY
Start: 2018-10-15 | End: 2021-03-30

## 2018-10-24 NOTE — TELEPHONE ENCOUNTER
TRIAGE  Pt stating has headache, light headed and dizzy  BP is 179/123  Pt made appointment for today at 2:45 with Savanah Pagan  Pt aware if becomes more symptomatic sob, chest pain nausea to go to ED immediately   Roger Ann MA

## 2018-10-24 NOTE — TELEPHONE ENCOUNTER
Saw Matheus Loza yesterday for high blood pressure    Been taking medication and blood pressure is 179-123    Triage to Joelle Holland

## 2018-10-24 NOTE — TELEPHONE ENCOUNTER
Spoke with patient advised once her BP is stable she can reschedule surgery  We need PCP to fax over a medical clearance  Verbalized understanding

## 2018-10-24 NOTE — PROGRESS NOTES
Assessment/Plan:    Problem List Items Addressed This Visit        Cardiovascular and Mediastinum    Essential hypertension - Primary     Her BP has significantly improved from yesterday  She will continue the Tribenzor and follow up in 1 week for recheck  Other    Anxiety     Will start on Lorazepam prn for anxiety as she is visibly anxious today and this is most likely contributing to her significantly elevated blood pressure readings  Relevant Medications    LORazepam (ATIVAN) 0 5 mg tablet          Patient Instructions   Call tomorrow with blood pressure readings  Follow up in the office in 1 week      Return in about 1 week (around 10/31/2018), or if symptoms worsen or fail to improve  Subjective:      Patient ID: Marcial Reis is a 44 y o  female  Chief Complaint   Patient presents with    Blood Pressure Check       Here today for BP check  Was seen in the office yesterday after her surgery was cancelled secondary to elevated blood pressure  She took 1 dose of Brendalyn Nichole yesterday around 5pm and then again around 6 am today  Her BP around 10 am today with 170s/120s  She is feeling anxious and she reports headache and nausea  She is concerned about her elevated BP  She has been under a lot of stress with recent home renovations  Feels like things haven't been going according to plan for the past month  She is anxious to get her BP under control  Going for labs tomorrow  The following portions of the patient's history were reviewed and updated as appropriate: allergies, current medications, past family history, past medical history, past social history, past surgical history and problem list     Review of Systems   Constitutional: Negative for chills, fatigue and fever  Eyes: Negative for visual disturbance  Respiratory: Negative for cough, shortness of breath and wheezing  Cardiovascular: Negative for chest pain, palpitations and leg swelling  Gastrointestinal: Positive for nausea  Negative for abdominal pain, diarrhea and vomiting  Skin: Negative for rash  Neurological: Positive for headaches  Negative for dizziness  Psychiatric/Behavioral: The patient is nervous/anxious  Current Outpatient Prescriptions   Medication Sig Dispense Refill    BIOTIN PO Take 30,000 mcg by mouth every morning      Cetirizine HCl (ZYRTEC ALLERGY PO) Take 20 mg by mouth every morning      Cyanocobalamin (B-12) 5000 MCG CAPS Take 5,000 mcg by mouth daily      ibuprofen (MOTRIN) 800 mg tablet Take 1 tablet (800 mg total) by mouth every 6 (six) hours as needed for mild pain (take with food) 20 tablet 0    levothyroxine 112 mcg tablet Take 1 tablet (112 mcg total) by mouth every morning 90 tablet 3    Olmesartan-Amlodipine-HCTZ 20-5-12 5 MG TABS Take 1 tablet by mouth daily 30 tablet 1    ondansetron (ZOFRAN) 4 mg tablet Take 1 tablet (4 mg total) by mouth every 8 (eight) hours as needed for nausea or vomiting May take 2 tabs if needed 20 tablet 0    PARoxetine (PAXIL) 30 mg tablet Take 1 tablet (30 mg total) by mouth every morning 30 tablet 5    clindamycin (CLEOCIN T) 1 % lotion   1    clobetasol (TEMOVATE) 0 05 % external solution   1    doxycycline hyclate (VIBRAMYCIN) 100 mg capsule   1    LORazepam (ATIVAN) 0 5 mg tablet Take 1 tablet (0 5 mg total) by mouth 2 (two) times a day 30 tablet 0     No current facility-administered medications for this visit  Objective:    /100   Pulse 102   Temp (!) 96 7 °F (35 9 °C)   Resp 20   Ht 5' 4" (1 626 m)   LMP 09/26/2018   BMI 37 90 kg/m²        Physical Exam   Constitutional: She appears well-developed and well-nourished  HENT:   Head: Normocephalic and atraumatic  Right Ear: Tympanic membrane, external ear and ear canal normal    Left Ear: Tympanic membrane, external ear and ear canal normal    Nose: No mucosal edema or rhinorrhea     Mouth/Throat: Uvula is midline, oropharynx is clear and moist and mucous membranes are normal    Eyes: Conjunctivae are normal    Neck: Neck supple  No edema present  No thyromegaly present  Cardiovascular: Normal rate, regular rhythm, normal heart sounds and intact distal pulses  No murmur heard  Pulmonary/Chest: Effort normal and breath sounds normal    Abdominal: Bowel sounds are normal  She exhibits no distension  There is no splenomegaly or hepatomegaly  There is no tenderness  Lymphadenopathy:        Right cervical: No superficial cervical adenopathy present  Left cervical: No superficial cervical adenopathy present  Skin: Skin is warm, dry and intact  No rash noted  Psychiatric: She has a normal mood and affect  Nursing note and vitals reviewed               Macel Hedge

## 2018-10-24 NOTE — TELEPHONE ENCOUNTER
Spoke with patient states she did she PCP yesterday and was given BP medication  She has a follow up appointment with them on Thursday  Would like to know when someone will call to reschedule surgery  Routing to provider for advise

## 2018-10-25 NOTE — ASSESSMENT & PLAN NOTE
Her BP has significantly improved from yesterday  She will continue the Tribenzor and follow up in 1 week for recheck

## 2018-10-25 NOTE — ASSESSMENT & PLAN NOTE
Will start on Lorazepam prn for anxiety as she is visibly anxious today and this is most likely contributing to her significantly elevated blood pressure readings

## 2018-10-31 ENCOUNTER — OFFICE VISIT (OUTPATIENT)
Dept: FAMILY MEDICINE CLINIC | Facility: CLINIC | Age: 43
End: 2018-10-31
Payer: COMMERCIAL

## 2018-10-31 VITALS
HEIGHT: 64 IN | WEIGHT: 218 LBS | RESPIRATION RATE: 16 BRPM | HEART RATE: 72 BPM | SYSTOLIC BLOOD PRESSURE: 158 MMHG | BODY MASS INDEX: 37.22 KG/M2 | TEMPERATURE: 96.1 F | DIASTOLIC BLOOD PRESSURE: 106 MMHG

## 2018-10-31 DIAGNOSIS — I16.0 HYPERTENSIVE URGENCY: ICD-10-CM

## 2018-10-31 DIAGNOSIS — I10 RESISTANT HYPERTENSION: Primary | ICD-10-CM

## 2018-10-31 PROCEDURE — 99213 OFFICE O/P EST LOW 20 MIN: CPT | Performed by: NURSE PRACTITIONER

## 2018-10-31 PROCEDURE — 1036F TOBACCO NON-USER: CPT | Performed by: NURSE PRACTITIONER

## 2018-10-31 PROCEDURE — 3008F BODY MASS INDEX DOCD: CPT | Performed by: NURSE PRACTITIONER

## 2018-10-31 RX ORDER — LOSARTAN POTASSIUM 25 MG/1
25 TABLET ORAL DAILY
Qty: 30 TABLET | Refills: 3 | Status: SHIPPED | OUTPATIENT
Start: 2018-10-31 | End: 2019-10-28 | Stop reason: SINTOL

## 2018-10-31 NOTE — PROGRESS NOTES
Assessment/Plan:    Problem List Items Addressed This Visit        Cardiovascular and Mediastinum    Essential hypertension - Primary     Not controlled  Will increase Losartan to total of 50 mg daily and continue current dose of Tribenzor  Referred to cardiology  Relevant Medications    losartan (COZAAR) 25 mg tablet    Hypertensive urgency    Relevant Medications    losartan (COZAAR) 25 mg tablet          Patient Instructions   Continue Tribenzor  Email me updated blood pressure readings  No Follow-up on file  Subjective:      Patient ID: Lázaro Mclaughlin is a 44 y o  female  Chief Complaint   Patient presents with    Follow-up     blood pressure check         Here today for BP check  Her BP was 131/90 last night  Still getting occasional SBP 160s and DBP 100s  She feels cold all the time, otherwise tolerating well  Taking her medication in the morning  Has lost a few pounds since starting her medication  The following portions of the patient's history were reviewed and updated as appropriate: allergies, current medications, past family history, past medical history, past social history, past surgical history and problem list     Review of Systems   Constitutional: Negative for chills, fatigue and fever  Respiratory: Negative for cough, shortness of breath and wheezing  Cardiovascular: Negative for chest pain, palpitations and leg swelling  Gastrointestinal: Negative for abdominal pain, diarrhea, nausea and vomiting  Endocrine: Positive for cold intolerance  Skin: Negative for rash  Neurological: Negative for dizziness and headaches           Current Outpatient Prescriptions   Medication Sig Dispense Refill    BIOTIN PO Take 30,000 mcg by mouth every morning      Cetirizine HCl (ZYRTEC ALLERGY PO) Take 20 mg by mouth every morning      clindamycin (CLEOCIN T) 1 % lotion   1    clobetasol (TEMOVATE) 0 05 % external solution   1    Cyanocobalamin (B-12) 5000 MCG CAPS Take 5,000 mcg by mouth daily      doxycycline hyclate (VIBRAMYCIN) 100 mg capsule   1    ibuprofen (MOTRIN) 800 mg tablet Take 1 tablet (800 mg total) by mouth every 6 (six) hours as needed for mild pain (take with food) 20 tablet 0    levothyroxine 112 mcg tablet Take 1 tablet (112 mcg total) by mouth every morning 90 tablet 3    LORazepam (ATIVAN) 0 5 mg tablet Take 1 tablet (0 5 mg total) by mouth 2 (two) times a day 30 tablet 0    Olmesartan-Amlodipine-HCTZ 20-5-12 5 MG TABS Take 1 tablet by mouth daily 30 tablet 1    ondansetron (ZOFRAN) 4 mg tablet Take 1 tablet (4 mg total) by mouth every 8 (eight) hours as needed for nausea or vomiting May take 2 tabs if needed 20 tablet 0    PARoxetine (PAXIL) 30 mg tablet Take 1 tablet (30 mg total) by mouth every morning 30 tablet 5    losartan (COZAAR) 25 mg tablet Take 1 tablet (25 mg total) by mouth daily 30 tablet 3     No current facility-administered medications for this visit  Objective:    BP (!) 158/106 (BP Location: Right arm)   Pulse 72   Temp (!) 96 1 °F (35 6 °C)   Resp 16   Ht 5' 4" (1 626 m)   Wt 98 9 kg (218 lb)   LMP 09/26/2018   BMI 37 42 kg/m²        Physical Exam   Constitutional: She appears well-developed and well-nourished  HENT:   Head: Normocephalic and atraumatic  Right Ear: Tympanic membrane, external ear and ear canal normal    Left Ear: Tympanic membrane, external ear and ear canal normal    Nose: No mucosal edema or rhinorrhea  Mouth/Throat: Uvula is midline, oropharynx is clear and moist and mucous membranes are normal    Eyes: Conjunctivae are normal    Neck: Neck supple  No edema present  No thyromegaly present  Cardiovascular: Normal rate, regular rhythm, normal heart sounds and intact distal pulses  No murmur heard  Pulmonary/Chest: Effort normal and breath sounds normal    Abdominal: Bowel sounds are normal  She exhibits no distension  There is no splenomegaly or hepatomegaly   There is no tenderness  Lymphadenopathy:        Right cervical: No superficial cervical adenopathy present  Left cervical: No superficial cervical adenopathy present  Skin: Skin is warm, dry and intact  No rash noted  Psychiatric: She has a normal mood and affect  Nursing note and vitals reviewed               Macel Hedge

## 2018-10-31 NOTE — ASSESSMENT & PLAN NOTE
Not controlled  Will increase Losartan to total of 50 mg daily and continue current dose of Tribenzor  Referred to cardiology

## 2018-12-21 DIAGNOSIS — I10 ESSENTIAL HYPERTENSION: ICD-10-CM

## 2018-12-21 RX ORDER — OLMESARTAN MEDOXOMIL, AMLODIPINE AND HYDROCHLOROTHIAZIDE TABLET 20/5/12.5 MG 20; 5; 12.5 MG/1; MG/1; MG/1
TABLET ORAL
Qty: 30 TABLET | Refills: 1 | Status: SHIPPED | OUTPATIENT
Start: 2018-12-21 | End: 2019-02-28 | Stop reason: SDUPTHER

## 2019-02-28 DIAGNOSIS — F41.9 ANXIETY: ICD-10-CM

## 2019-02-28 DIAGNOSIS — I10 ESSENTIAL HYPERTENSION: ICD-10-CM

## 2019-02-28 RX ORDER — OLMESARTAN MEDOXOMIL, AMLODIPINE AND HYDROCHLOROTHIAZIDE TABLET 20/5/12.5 MG 20; 5; 12.5 MG/1; MG/1; MG/1
1 TABLET ORAL DAILY
Qty: 30 TABLET | Refills: 0 | Status: SHIPPED | OUTPATIENT
Start: 2019-02-28 | End: 2019-04-03 | Stop reason: SDUPTHER

## 2019-02-28 RX ORDER — PAROXETINE 30 MG/1
30 TABLET, FILM COATED ORAL EVERY MORNING
Qty: 30 TABLET | Refills: 0 | Status: SHIPPED | OUTPATIENT
Start: 2019-02-28 | End: 2019-04-02 | Stop reason: SDUPTHER

## 2019-02-28 NOTE — TELEPHONE ENCOUNTER
30 day supply sent to pharmacy  She needs to be seen for BP check and f/u with her PCP     Raúl Martinez

## 2019-03-01 ENCOUNTER — TELEPHONE (OUTPATIENT)
Dept: FAMILY MEDICINE CLINIC | Facility: CLINIC | Age: 44
End: 2019-03-01

## 2019-03-01 DIAGNOSIS — E03.9 HYPOTHYROIDISM, UNSPECIFIED TYPE: Primary | ICD-10-CM

## 2019-03-01 DIAGNOSIS — E78.2 HYPERLIPEMIA, MIXED: ICD-10-CM

## 2019-03-01 DIAGNOSIS — I16.0 HYPERTENSIVE URGENCY: ICD-10-CM

## 2019-03-01 DIAGNOSIS — E55.9 VITAMIN D DEFICIENCY: ICD-10-CM

## 2019-03-01 NOTE — TELEPHONE ENCOUNTER
Pt has an apt for an upcoming physical in March and would like BW prior, please call when order is ready for

## 2019-03-01 NOTE — TELEPHONE ENCOUNTER
Can you please order for pt  Dr Pedro Pablo Thomas is not in the office for another week   Albin Spivey, [place]

## 2019-03-22 LAB
25(OH)D3+25(OH)D2 SERPL-MCNC: 22.9 NG/ML (ref 30–100)
ALBUMIN SERPL-MCNC: 4 G/DL (ref 3.5–5.5)
ALBUMIN/GLOB SERPL: 1.4 {RATIO} (ref 1.2–2.2)
ALP SERPL-CCNC: 102 IU/L (ref 39–117)
ALT SERPL-CCNC: 42 IU/L (ref 0–32)
AST SERPL-CCNC: 40 IU/L (ref 0–40)
BILIRUB SERPL-MCNC: 0.4 MG/DL (ref 0–1.2)
BUN SERPL-MCNC: 9 MG/DL (ref 6–24)
BUN/CREAT SERPL: 12 (ref 9–23)
CALCIUM SERPL-MCNC: 9.2 MG/DL (ref 8.7–10.2)
CHLORIDE SERPL-SCNC: 98 MMOL/L (ref 96–106)
CHOLEST SERPL-MCNC: 251 MG/DL (ref 100–199)
CO2 SERPL-SCNC: 27 MMOL/L (ref 20–29)
CREAT SERPL-MCNC: 0.77 MG/DL (ref 0.57–1)
ERYTHROCYTE [DISTWIDTH] IN BLOOD BY AUTOMATED COUNT: 13.8 % (ref 12.3–15.4)
GLOBULIN SER-MCNC: 2.9 G/DL (ref 1.5–4.5)
GLUCOSE SERPL-MCNC: 144 MG/DL (ref 65–99)
HCT VFR BLD AUTO: 40.7 % (ref 34–46.6)
HDLC SERPL-MCNC: 47 MG/DL
HGB BLD-MCNC: 13.9 G/DL (ref 11.1–15.9)
LDLC SERPL CALC-MCNC: 167 MG/DL (ref 0–99)
LDLC/HDLC SERPL: 3.6 RATIO (ref 0–3.2)
MCH RBC QN AUTO: 32.7 PG (ref 26.6–33)
MCHC RBC AUTO-ENTMCNC: 34.2 G/DL (ref 31.5–35.7)
MCV RBC AUTO: 96 FL (ref 79–97)
MICRODELETION SYND BLD/T FISH: NORMAL
PLATELET # BLD AUTO: 214 X10E3/UL (ref 150–379)
POTASSIUM SERPL-SCNC: 3.8 MMOL/L (ref 3.5–5.2)
PROT SERPL-MCNC: 6.9 G/DL (ref 6–8.5)
RBC # BLD AUTO: 4.25 X10E6/UL (ref 3.77–5.28)
SL AMB EGFR AFRICAN AMERICAN: 109 ML/MIN/1.73
SL AMB EGFR NON AFRICAN AMERICAN: 95 ML/MIN/1.73
SL AMB VLDL CHOLESTEROL CALC: 37 MG/DL (ref 5–40)
SODIUM SERPL-SCNC: 142 MMOL/L (ref 134–144)
T4 FREE SERPL-MCNC: 1.3 NG/DL (ref 0.82–1.77)
TRIGL SERPL-MCNC: 186 MG/DL (ref 0–149)
TSH SERPL DL<=0.005 MIU/L-ACNC: 5.99 UIU/ML (ref 0.45–4.5)
WBC # BLD AUTO: 6.9 X10E3/UL (ref 3.4–10.8)

## 2019-03-27 NOTE — PROGRESS NOTES
[facility] HEALTH MAINTENANCE OFFICE VISIT  [facility] - [address]    NAME: Astrid Sullivan  AGE: 38 y o  SEX: female  : 1975     DATE: 3/28/2019    Assessment and Plan     Problem List Items Addressed This Visit        Endocrine    Impaired fasting glucose    Relevant Orders    POCT hemoglobin A1c    Type 2 diabetes mellitus without complication, without long-term current use of insulin (HCC)     Lab Results   Component Value Date    HGBA1C 6 0 (H) 2017       No results for input(s): POCGLU in the last 72 hours  Blood Sugar Average: Last 72 hrs: This is a new diagnosis and she is not interested in medication at this time  A1C in the office is 7 3  She will see diabetic educator and will try to change her diet and increase exercise  Advised on good foot and eye care  Return in 3 months after labs  Relevant Orders    Ambulatory referral to Diabetic Education    CBC and differential    Comprehensive metabolic panel    Lipid panel    HEMOGLOBIN A1C W/ EAG ESTIMATION       Other    Anxiety    Relevant Medications    LORazepam (ATIVAN) 0 5 mg tablet    Hyperlipemia, mixed     This has worsened and she does not want medication despite recommendations  She will work on diet and exercise and will recheck in 3 months, at which time we can readdress issue of possible medication           Relevant Orders    CBC and differential    Comprehensive metabolic panel    Lipid panel    BMI 38 0-38 9,adult    Relevant Orders    Ambulatory referral to Pulmonology      Other Visit Diagnoses     Well adult exam    -  Primary    Other fatigue        Referred for sleep study at her request     Relevant Orders    Ambulatory referral to Pulmonology            · Patient Counseling:   · Nutrition: Stressed importance of a well balanced diet, moderation of sodium/saturated fat, caloric balance and sufficient intake of fiber  · Exercise: Stressed the importance of regular exercise with a goal of 150 minutes per week  · Dental Health: Discussed daily flossing and brushing and regular dental visits     · Immunizations reviewed, adacel up to date  · Discussed benefits of screening, sees gyn for pap   BMI Counseling: Body mass index is 38 76 kg/m²  Discussed with patient's BMI with her  The BMI is above average  BMI counseling and education was provided to the patient  Nutrition recommendations include reducing portion sizes and decreasing overall calorie intake  Exercise recommendations include moderate aerobic physical activity for 150 minutes/week  Return in about 3 months (around 6/28/2019)  Chief Complaint     Chief Complaint   Patient presents with    Physical Exam     [facility]       History of Present Illness     Here for CPE  Would like sleep study as she is always fatigued and does not sleep well  Well Adult Physical   Patient here for a comprehensive physical exam       Diet and Physical Activity  Diet: well balanced diet  Exercise: frequently      Depression Screen  PHQ-9 Depression Screening    PHQ-9:    Frequency of the following problems over the past two weeks:               General Health  Hearing: Normal:  bilateral  Vision: no vision problems  Dental: regular dental visits          The following portions of the patient's history were reviewed and updated as appropriate: allergies, current medications, past family history, past medical history, past social history, past surgical history and problem list     Review of Systems     Review of Systems   Constitutional: Positive for fatigue  HENT: Negative  Eyes: Negative  Respiratory: Negative  Cardiovascular: Negative  Gastrointestinal: Negative  Endocrine: Negative  Genitourinary: Negative  Musculoskeletal: Negative  Skin: Negative  Allergic/Immunologic: Negative  Neurological: Negative  Hematological: Negative  Psychiatric/Behavioral: Negative          Past Medical History     Past Medical History:   Diagnosis Date    Abnormal ECG 12/2015    Interventricular conduction relay disorder    Abnormal Pap smear of cervix Jan 2015    See medical records from Dr Maxwell Ramesh Disease of thyroid gland     hypo    HPV (human papilloma virus) infection Jan 2015    See medical records from Dr Arleen Faria HTN (hypertension)     Irregular heart beat     interventricular  conduction relay disorder    Nausea 12/2015    since gastric sleeve    Ovarian cyst     Urinary tract infection     chronic issue    Varicella March 1985       Past Surgical History     Past Surgical History:   Procedure Laterality Date    BARIATRIC SURGERY  Dec 2015    Gastric Sleeve    CERVICAL BIOPSY N/A 3/2/2017    Procedure: BIOPSY LEEP CERVIX;  Surgeon: Rick Charles MD;  Location: [address] [place] [address];  Service:     CERVICAL BIOPSY  W/ LOOP ELECTRODE EXCISION  Mar 2017    See medical records from Dr Arleen Faria ESOPHAGOGASTRODUODENOSCOPY     [address]  12/2015    sleeve at age 45    TONSILLECTOMY      adenoids-age 35    WISDOM TOOTH EXTRACTION      x4-age 18       Social History     Social History     Socioeconomic History    Marital status: /Civil Union     Spouse name: None    Number of children: None    Years of education: None    Highest education level: None   Occupational History    None   Social Needs    Financial resource strain: None    Food insecurity:     Worry: None     Inability: None    Transportation needs:     Medical: None     Non-medical: None   Tobacco Use    Smoking status: Former Smoker     Packs/day: 0 25     Years: 10 00     Pack years: 2 50     Last attempt to quit: 2004     Years since quitting: 15 1    Smokeless tobacco: Never Used   Substance and Sexual Activity    Alcohol use:  Yes     Alcohol/week: 3 6 oz     Types: 4 Standard drinks or equivalent per week     Comment: occasional    Drug use: Yes     Frequency: 7 0 times per week     Types: Marijuana     Comment: occasionally    Sexual activity: Yes     Partners: Male     Birth control/protection: Condom Male   Lifestyle    Physical activity:     Days per week: None     Minutes per session: None    Stress: None   Relationships    Social connections:     Talks on phone: None     Gets together: None     Attends Zoroastrianism service: None     Active member of club or organization: None     Attends meetings of clubs or organizations: None     Relationship status: None    Intimate partner violence:     Fear of current or ex partner: None     Emotionally abused: None     Physically abused: None     Forced sexual activity: None   Other Topics Concern    None   Social History Narrative    Caffeine use        Family History     Family History   Problem Relation Age of Onset    Diabetes Mother         diet controlled    Diabetes Father         insulin    Other Father         agent orange       Current Medications       Current Outpatient Medications:     BIOTIN PO, Take 30,000 mcg by mouth every morning, Disp: , Rfl:     Cetirizine HCl (ZYRTEC ALLERGY PO), Take 20 mg by mouth every morning, Disp: , Rfl:     clindamycin (CLEOCIN T) 1 % lotion, , Disp: , Rfl: 1    clobetasol (TEMOVATE) 0 05 % external solution, , Disp: , Rfl: 1    Cyanocobalamin (B-12) 5000 MCG CAPS, Take 5,000 mcg by mouth daily, Disp: , Rfl:     ibuprofen (MOTRIN) 800 mg tablet, Take 1 tablet (800 mg total) by mouth every 6 (six) hours as needed for mild pain (take with food), Disp: 20 tablet, Rfl: 0    levothyroxine 112 mcg tablet, Take 1 tablet (112 mcg total) by mouth every morning, Disp: 90 tablet, Rfl: 3    LORazepam (ATIVAN) 0 5 mg tablet, Take 1 tablet (0 5 mg total) by mouth 2 (two) times a day as needed for anxiety, Disp: 30 tablet, Rfl: 0    Olmesartan-Amlodipine-HCTZ 20-5-12 5 MG TABS, Take 1 tablet by mouth daily, Disp: 30 tablet, Rfl: 0    ondansetron (ZOFRAN) 4 mg tablet, Take 1 tablet (4 mg total) by mouth every 8 (eight) hours as needed for nausea or vomiting May take 2 tabs if needed, Disp: 20 tablet, Rfl: 0    PARoxetine (PAXIL) 30 mg tablet, Take 1 tablet (30 mg total) by mouth every morning, Disp: 30 tablet, Rfl: 0    doxycycline hyclate (VIBRAMYCIN) 100 mg capsule, , Disp: , Rfl: 1    losartan (COZAAR) 25 mg tablet, Take 1 tablet (25 mg total) by mouth daily (Patient not taking: Reported on 3/28/2019), Disp: 30 tablet, Rfl: 3     Allergies     Allergies   Allergen Reactions    [place] (Diagnostic) Anaphylaxis     All stone nuts  Lips/tongue swell-Birch Tree allergy    Bee Venom Anaphylaxis     All over swelling/pain/redness    Soy Lecithin [Lecithin] Anaphylaxis     [place]/throat swelling-dyspnea  Pt is only allergic to SOY       Objective     /74   Pulse 68   Temp 97 5 °F (36 4 °C)   Resp 16   Ht 5' 4" (1 626 m)   Wt 102 kg (225 lb 12 8 oz)   LMP 01/17/2019 (Exact Date)   BMI 38 76 kg/m²      Physical Exam   Constitutional: She is oriented to person, place, and time  She appears well-developed and well-nourished  No distress  HENT:   Head: Normocephalic and atraumatic  Right Ear: External ear normal    Left Ear: External ear normal    Mouth/Throat: Oropharynx is clear and moist    Eyes: EOM are normal    Neck: Normal range of motion  Neck supple  No thyromegaly present  Cardiovascular: Normal rate, regular rhythm, normal heart sounds and intact distal pulses  Exam reveals no gallop and no friction rub  No murmur heard  Pulmonary/Chest: Effort normal and breath sounds normal  She has no wheezes  She has no rales  Abdominal: Soft  Bowel sounds are normal  She exhibits no mass  There is no tenderness  There is no rebound  Musculoskeletal: Normal range of motion  She exhibits no deformity  Lymphadenopathy:     She has no cervical adenopathy  Neurological: She is alert and oriented to person, place, and time  She has normal reflexes  She displays normal reflexes  No cranial nerve deficit  She exhibits normal muscle tone  Coordination normal    Skin: Skin is warm and dry  No rash noted  She is not diaphoretic  Psychiatric: She has a normal mood and affect   Her behavior is normal  Judgment and thought content normal           Visual Acuity Screening    Right eye Left eye Both eyes   Without correction:      With correction: 20/25 20/25 20/20           Yasmine Alcocer, [address]

## 2019-03-28 ENCOUNTER — OFFICE VISIT (OUTPATIENT)
Dept: FAMILY MEDICINE CLINIC | Facility: CLINIC | Age: 44
End: 2019-03-28
Payer: COMMERCIAL

## 2019-03-28 VITALS
BODY MASS INDEX: 38.55 KG/M2 | SYSTOLIC BLOOD PRESSURE: 122 MMHG | RESPIRATION RATE: 16 BRPM | HEART RATE: 68 BPM | HEIGHT: 64 IN | TEMPERATURE: 97.5 F | DIASTOLIC BLOOD PRESSURE: 74 MMHG | WEIGHT: 225.8 LBS

## 2019-03-28 DIAGNOSIS — R53.83 OTHER FATIGUE: ICD-10-CM

## 2019-03-28 DIAGNOSIS — F41.9 ANXIETY: ICD-10-CM

## 2019-03-28 DIAGNOSIS — Z00.00 WELL ADULT EXAM: Primary | ICD-10-CM

## 2019-03-28 DIAGNOSIS — E78.2 HYPERLIPEMIA, MIXED: ICD-10-CM

## 2019-03-28 DIAGNOSIS — R73.01 IMPAIRED FASTING GLUCOSE: ICD-10-CM

## 2019-03-28 DIAGNOSIS — E11.9 TYPE 2 DIABETES MELLITUS WITHOUT COMPLICATION, WITHOUT LONG-TERM CURRENT USE OF INSULIN (HCC): ICD-10-CM

## 2019-03-28 LAB — SL AMB POCT HEMOGLOBIN AIC: 7.3 (ref ?–6.5)

## 2019-03-28 PROCEDURE — 99396 PREV VISIT EST AGE 40-64: CPT | Performed by: INTERNAL MEDICINE

## 2019-03-28 PROCEDURE — 83036 HEMOGLOBIN GLYCOSYLATED A1C: CPT | Performed by: INTERNAL MEDICINE

## 2019-03-28 RX ORDER — LORAZEPAM 0.5 MG/1
0.5 TABLET ORAL 2 TIMES DAILY PRN
Qty: 30 TABLET | Refills: 0 | Status: SHIPPED | OUTPATIENT
Start: 2019-03-28 | End: 2021-03-30

## 2019-03-28 NOTE — ASSESSMENT & PLAN NOTE
Lab Results   Component Value Date    HGBA1C 6 0 (H) 01/13/2017       No results for input(s): POCGLU in the last 72 hours  Blood Sugar Average: Last 72 hrs: This is a new diagnosis and she is not interested in medication at this time  A1C in the office is 7 3  She will see diabetic educator and will try to change her diet and increase exercise  Advised on good foot and eye care  Return in 3 months after labs

## 2019-03-28 NOTE — ASSESSMENT & PLAN NOTE
This has worsened and she does not want medication despite recommendations  She will work on diet and exercise and will recheck in 3 months, at which time we can readdress issue of possible medication

## 2019-04-02 DIAGNOSIS — F41.9 ANXIETY: ICD-10-CM

## 2019-04-02 RX ORDER — PAROXETINE 30 MG/1
30 TABLET, FILM COATED ORAL EVERY MORNING
Qty: 30 TABLET | Refills: 5 | Status: SHIPPED | OUTPATIENT
Start: 2019-04-02 | End: 2019-10-11 | Stop reason: SDUPTHER

## 2019-04-03 DIAGNOSIS — I10 ESSENTIAL HYPERTENSION: ICD-10-CM

## 2019-04-03 RX ORDER — OLMESARTAN MEDOXOMIL, AMLODIPINE AND HYDROCHLOROTHIAZIDE TABLET 20/5/12.5 MG 20; 5; 12.5 MG/1; MG/1; MG/1
TABLET ORAL
Qty: 30 TABLET | Refills: 2 | Status: SHIPPED | OUTPATIENT
Start: 2019-04-03 | End: 2019-07-08 | Stop reason: SDUPTHER

## 2019-06-24 PROBLEM — E66.812 CLASS 2 SEVERE OBESITY WITH SERIOUS COMORBIDITY AND BODY MASS INDEX (BMI) OF 38.0 TO 38.9 IN ADULT (HCC): Status: ACTIVE | Noted: 2019-06-24

## 2019-06-24 PROBLEM — E66.01 CLASS 2 SEVERE OBESITY WITH SERIOUS COMORBIDITY AND BODY MASS INDEX (BMI) OF 38.0 TO 38.9 IN ADULT (HCC): Status: ACTIVE | Noted: 2019-06-24

## 2019-07-08 DIAGNOSIS — I10 ESSENTIAL HYPERTENSION: ICD-10-CM

## 2019-07-08 RX ORDER — OLMESARTAN MEDOXOMIL, AMLODIPINE AND HYDROCHLOROTHIAZIDE TABLET 20/5/12.5 MG 20; 5; 12.5 MG/1; MG/1; MG/1
TABLET ORAL
Qty: 30 TABLET | Refills: 5 | Status: SHIPPED | OUTPATIENT
Start: 2019-07-08 | End: 2019-10-28 | Stop reason: SDUPTHER

## 2019-07-28 ENCOUNTER — HOSPITAL ENCOUNTER (EMERGENCY)
Facility: HOSPITAL | Age: 44
Discharge: HOME | End: 2019-07-28
Attending: EMERGENCY MEDICINE | Admitting: EMERGENCY MEDICINE
Payer: COMMERCIAL

## 2019-07-28 ENCOUNTER — APPOINTMENT (EMERGENCY)
Dept: RADIOLOGY | Facility: HOSPITAL | Age: 44
End: 2019-07-28
Payer: COMMERCIAL

## 2019-07-28 VITALS
DIASTOLIC BLOOD PRESSURE: 94 MMHG | HEART RATE: 74 BPM | WEIGHT: 219.8 LBS | BODY MASS INDEX: 37.73 KG/M2 | TEMPERATURE: 97.1 F | RESPIRATION RATE: 16 BRPM | SYSTOLIC BLOOD PRESSURE: 140 MMHG | OXYGEN SATURATION: 98 %

## 2019-07-28 DIAGNOSIS — M79.672 FOOT PAIN, LEFT: Primary | ICD-10-CM

## 2019-07-28 PROCEDURE — 96372 THER/PROPH/DIAG INJ SC/IM: CPT

## 2019-07-28 PROCEDURE — 99283 EMERGENCY DEPT VISIT LOW MDM: CPT

## 2019-07-28 PROCEDURE — 73630 X-RAY EXAM OF FOOT: CPT

## 2019-07-28 RX ORDER — KETOROLAC TROMETHAMINE 30 MG/ML
15 INJECTION, SOLUTION INTRAMUSCULAR; INTRAVENOUS ONCE
Status: COMPLETED | OUTPATIENT
Start: 2019-07-28 | End: 2019-07-28

## 2019-07-28 RX ORDER — NAPROXEN 500 MG/1
500 TABLET ORAL 2 TIMES DAILY WITH MEALS
Qty: 30 TABLET | Refills: 0 | Status: SHIPPED | OUTPATIENT
Start: 2019-07-28 | End: 2022-03-31

## 2019-07-28 RX ADMIN — KETOROLAC TROMETHAMINE 15 MG: 30 INJECTION, SOLUTION INTRAMUSCULAR at 18:12

## 2019-07-28 NOTE — ED PROVIDER NOTES
History  Chief Complaint   Patient presents with    Foot Pain     last night struck a shop vac with left top of left foot, c/o pain in same     HPI    38 year female that presents with foot pain  Patient states she checked a shot with the left foot and since then been having pain  States there is some mild swelling on the foot  Denies any ankle pain  No numbness or tingling  No calf tenderness no lacerations  44 year female that presents with foot pain will get an x-ray    Prior to Admission Medications   Prescriptions Last Dose Informant Patient Reported? Taking?    BIOTIN PO 7/28/2019 at 1540 Self Yes Yes   Sig: Take 30,000 mcg by mouth every morning   Cetirizine HCl (ZYRTEC ALLERGY PO) 7/28/2019 at 1540 Self Yes Yes   Sig: Take 20 mg by mouth every morning   Cyanocobalamin (B-12) 5000 MCG CAPS 7/28/2019 at 1540  Yes Yes   Sig: Take 5,000 mcg by mouth daily   LORazepam (ATIVAN) 0 5 mg tablet Past Week at Unknown time  No Yes   Sig: Take 1 tablet (0 5 mg total) by mouth 2 (two) times a day as needed for anxiety   Olmesartan-amLODIPine-HCTZ 20-5-12 5 MG TABS 7/28/2019 at 1540  No Yes   Sig: TAKE ONE TABLET BY MOUTH EVERY DAY   PARoxetine (PAXIL) 30 mg tablet 7/28/2019 at 1540  No Yes   Sig: Take 1 tablet (30 mg total) by mouth every morning   clindamycin (CLEOCIN T) 1 % lotion Not Taking at Unknown time  Yes No   clobetasol (TEMOVATE) 0 05 % external solution   Yes No   doxycycline hyclate (VIBRAMYCIN) 100 mg capsule Not Taking at Unknown time  Yes No   ibuprofen (MOTRIN) 800 mg tablet Unknown at Unknown time  No No   Sig: Take 1 tablet (800 mg total) by mouth every 6 (six) hours as needed for mild pain (take with food)   levothyroxine 112 mcg tablet 7/28/2019 at 1540  No Yes   Sig: Take 1 tablet (112 mcg total) by mouth every morning   losartan (COZAAR) 25 mg tablet Not Taking at Unknown time  No No   Sig: Take 1 tablet (25 mg total) by mouth daily   Patient not taking: Reported on 3/28/2019   ondansetron (ZOFRAN) 4 mg tablet Past Week at Unknown time Self No Yes   Sig: Take 1 tablet (4 mg total) by mouth every 8 (eight) hours as needed for nausea or vomiting May take 2 tabs if needed      Facility-Administered Medications: None       Past Medical History:   Diagnosis Date    Abnormal ECG 12/2015    Interventricular conduction relay disorder    Abnormal Pap smear of cervix Jan 2015    See medical records from Dr Anne Tpiton Disease of thyroid gland     hypo    HPV (human papilloma virus) infection Jan 2015    See medical records from Dr Echeverria Daily HTN (hypertension)     Irregular heart beat     interventricular  conduction relay disorder    Nausea 12/2015    since gastric sleeve    Ovarian cyst     Urinary tract infection     chronic issue    Varicella March 1985       Past Surgical History:   Procedure Laterality Date    BARIATRIC SURGERY  Dec 2015    Gastric Sleeve    CERVICAL BIOPSY N/A 3/2/2017    Procedure: BIOPSY LEEP CERVIX;  Surgeon: Annia Cole MD;  Location: [address] [place] [address];  Service:    [facility] CERVICAL BIOPSY  W/ LOOP ELECTRODE EXCISION  Mar 2017    See medical records from Dr Echeverria Daily ESOPHAGOGASTRODUODENOSCOPY     [address]  12/2015    sleeve at age 45    TONSILLECTOMY      adenoids-age 35    WISDOM TOOTH EXTRACTION      x4-age 18       Family History   Problem Relation Age of Onset    Diabetes Mother         diet controlled    Diabetes Father         insulin    Other Father         agent orange     I have reviewed and agree with the history as documented  Social History     Tobacco Use    Smoking status: Former Smoker     Packs/day: 0 25     Years: 10 00     Pack years: 2 50     Last attempt to quit: 2004     Years since quitting: 15 4    Smokeless tobacco: Never Used   Substance Use Topics    Alcohol use:  Yes     Alcohol/week: 6 0 standard drinks     Types: 4 Standard drinks or equivalent per week     Comment: occasional    Drug use: Yes     Frequency: 7 0 times per week     Types: Marijuana     Comment: occasionally        Review of Systems   Constitutional: Negative  Negative for diaphoresis and fever  HENT: Negative  Respiratory: Negative  Negative for cough, shortness of breath and wheezing  Cardiovascular: Negative  Negative for chest pain, palpitations and leg swelling  Gastrointestinal: Negative for abdominal distention, abdominal pain, nausea and vomiting  Genitourinary: Negative  Musculoskeletal:        Left foot pain   Skin: Negative  Neurological: Negative  Psychiatric/Behavioral: Negative  All other systems reviewed and are negative  Physical Exam  Physical Exam   Constitutional: She is oriented to person, place, and time  She appears well-developed  HENT:   Head: Normocephalic and atraumatic  Eyes: Pupils are equal, round, and reactive to light  EOM are normal    Neck: Normal range of motion  Neck supple  Cardiovascular: Normal rate, regular rhythm and normal heart sounds  No murmur heard  Pulmonary/Chest: Effort normal and breath sounds normal    Abdominal: Soft  Bowel sounds are normal  She exhibits no distension  There is no tenderness  There is no rebound and no guarding  Musculoskeletal: Normal range of motion  Left foot very mild edema  No abrasions no bruises appreciated  2+ DP pulses  Soft compartments  No ankle tenderness   Neurological: She is alert and oriented to person, place, and time  Skin: Skin is warm and dry  Psychiatric: She has a normal mood and affect         Vital Signs  ED Triage Vitals [07/28/19 1744]   Temperature Pulse Respirations Blood Pressure SpO2   (!) 97 1 °F (36 2 °C) 74 16 140/94 98 %      Temp Source Heart Rate Source Patient Position - Orthostatic VS BP Location FiO2 (%)   Tympanic Monitor Sitting Right arm --      Pain Score       7           Vitals:    07/28/19 1744   BP: 140/94   Pulse: 74   Patient Position - Orthostatic VS: Sitting Visual Acuity      ED Medications  Medications   ketorolac (TORADOL) injection 15 mg (15 mg Intramuscular Given 7/28/19 1812)       Diagnostic Studies  Results Reviewed     None                 XR foot 3+ views LEFT   Final Result by Ky Fair MD (07/29 0631)      No acute osseous abnormality  Workstation performed: [id number]                    Procedures  Procedures       ED Course                               MDM    Disposition  Final diagnoses: Foot pain, left     Time reflects when diagnosis was documented in both MDM as applicable and the Disposition within this note     Time User Action Codes Description Comment    7/28/2019  6:26 PM Noemi Paci Add [S01 436] Foot pain, left       ED Disposition     ED Disposition Condition Date/Time Comment    Discharge Stable Sun Jul 28, 2019  6:26 PM [place] Lunch discharge to home/self care              Follow-up Information     Follow up With Specialties Details Why Contact Info    Jenelle Guevara MD Orthopedic Surgery  As needed [address] [employer] [address]  [phone]            Discharge Medication List as of 7/28/2019  6:27 PM      START taking these medications    Details   naproxen (NAPROSYN) 500 mg tablet Take 1 tablet (500 mg total) by mouth 2 (two) times a day with meals, Starting Sun 7/28/2019, Print         CONTINUE these medications which have NOT CHANGED    Details   BIOTIN PO Take 30,000 mcg by mouth every morning, Historical Med      Cetirizine HCl (ZYRTEC ALLERGY PO) Take 20 mg by mouth every morning, Historical Med      clindamycin (CLEOCIN T) 1 % lotion Starting Mon 10/15/2018, Historical Med      clobetasol (TEMOVATE) 0 05 % external solution Starting Mon 10/15/2018, Historical Med      Cyanocobalamin (B-12) 5000 MCG CAPS Take 5,000 mcg by mouth daily, Historical Med      doxycycline hyclate (VIBRAMYCIN) 100 mg capsule Starting Mon 10/15/2018, Historical Med      ibuprofen (MOTRIN) 800 mg tablet Take 1 tablet (800 mg total) by mouth every 6 (six) hours as needed for mild pain (take with food), Starting Thu 10/4/2018, Normal      levothyroxine 112 mcg tablet Take 1 tablet (112 mcg total) by mouth every morning, Starting Tue 9/18/2018, Normal      LORazepam (ATIVAN) 0 5 mg tablet Take 1 tablet (0 5 mg total) by mouth 2 (two) times a day as needed for anxiety, Starting Thu 3/28/2019, Normal      losartan (COZAAR) 25 mg tablet Take 1 tablet (25 mg total) by mouth daily, Starting Wed 10/31/2018, Normal      Olmesartan-amLODIPine-HCTZ 20-5-12 5 MG TABS TAKE ONE TABLET BY MOUTH EVERY DAY, Normal      ondansetron (ZOFRAN) 4 mg tablet Take 1 tablet (4 mg total) by mouth every 8 (eight) hours as needed for nausea or vomiting May take 2 tabs if needed, Starting Sun 4/15/2018, Normal      PARoxetine (PAXIL) 30 mg tablet Take 1 tablet (30 mg total) by mouth every morning, Starting Tue 4/2/2019, Normal           No discharge procedures on file      ED Provider  Electronically Signed by           Dedrick Car MD  07/29/19 7523

## 2019-07-29 ENCOUNTER — VBI (OUTPATIENT)
Dept: FAMILY MEDICINE CLINIC | Facility: CLINIC | Age: 44
End: 2019-07-29

## 2019-07-29 NOTE — TELEPHONE ENCOUNTER
Peace Acuña    ED Visit Information     Ed visit date: 7/28/19  Diagnosis Description: Foot pain L  In Network? Yes [id number]  Discharge status: Home  Discharged with meds ? Yes  Number of ED visits to date: 2  ED Severity:N/A     Outreach Information    Outreach successful: Yes 1  Date letter mailed:N/A  Date Finalized:7/29/19    Care Coordination    Follow up appointment with pcp: no no  Transportation issues ? No    Value Consolidated [place]    I spoke with pt who states her foot is feeling better and did not want to schedule a follow up appt  I made pt aware she can call office 24/7

## 2019-10-11 DIAGNOSIS — F41.9 ANXIETY: ICD-10-CM

## 2019-10-12 DIAGNOSIS — F41.9 ANXIETY: ICD-10-CM

## 2019-10-12 DIAGNOSIS — E03.9 HYPOTHYROIDISM, UNSPECIFIED TYPE: ICD-10-CM

## 2019-10-12 RX ORDER — LEVOTHYROXINE SODIUM 112 UG/1
TABLET ORAL
Qty: 30 TABLET | Refills: 0 | Status: SHIPPED | OUTPATIENT
Start: 2019-10-12 | End: 2019-12-12 | Stop reason: SDUPTHER

## 2019-10-12 RX ORDER — PAROXETINE 30 MG/1
30 TABLET, FILM COATED ORAL EVERY MORNING
Qty: 30 TABLET | Refills: 5 | Status: SHIPPED | OUTPATIENT
Start: 2019-10-12 | End: 2019-10-28 | Stop reason: SDUPTHER

## 2019-10-12 RX ORDER — PAROXETINE 30 MG/1
TABLET, FILM COATED ORAL
Qty: 30 TABLET | Refills: 0 | Status: SHIPPED | OUTPATIENT
Start: 2019-10-12 | End: 2019-10-28 | Stop reason: ALTCHOICE

## 2019-10-23 NOTE — PROGRESS NOTES
Subjective:      Patient ID: Nita Chen is a 38 y o  female  Chief Complaint   Patient presents with    [facility]     [facility]       Here for follow up of DM, hypertension  Has been walking 2 miles per day and has not been eating sugar at all  Is disappointed because her weight has not changed  Has not had any recent bloodwork  Denies polyuria, polydipsia  Has not seen the eye doctor in quite some time  Anxiety symptoms seem to be well controlled  She is taking her lorazepam regularly  The following portions of the patient's history were reviewed and updated as appropriate: allergies, current medications, past family history, past medical history, past social history, past surgical history and problem list     Review of Systems   Constitutional: Negative  Respiratory: Negative  Cardiovascular: Negative            Current Outpatient Medications   Medication Sig Dispense Refill    BIOTIN PO Take 30,000 mcg by mouth every morning      Cetirizine HCl (ZYRTEC ALLERGY PO) Take 20 mg by mouth every morning      Cyanocobalamin (B-12) 5000 MCG CAPS Take 5,000 mcg by mouth daily      ibuprofen (MOTRIN) 800 mg tablet Take 1 tablet (800 mg total) by mouth every 6 (six) hours as needed for mild pain (take with food) 20 tablet 0    levothyroxine 112 mcg tablet TAKE ONE TABLET BY MOUTH EVERY DAY IN THE MORNING 30 tablet 0    LORazepam (ATIVAN) 0 5 mg tablet Take 1 tablet (0 5 mg total) by mouth 2 (two) times a day as needed for anxiety 30 tablet 0    naproxen (NAPROSYN) 500 mg tablet Take 1 tablet (500 mg total) by mouth 2 (two) times a day with meals 30 tablet 0    Olmesartan-amLODIPine-HCTZ 20-5-12 5 MG TABS Take 1 tablet by mouth daily 30 tablet 5    ondansetron (ZOFRAN) 4 mg tablet Take 1 tablet (4 mg total) by mouth every 8 (eight) hours as needed for nausea or vomiting May take 2 tabs if needed 20 tablet 0    PARoxetine (PAXIL) 30 mg tablet Take 1 tablet (30 mg total) by mouth every morning 30 tablet 5    clindamycin (CLEOCIN T) 1 % lotion   1    clobetasol (TEMOVATE) 0 05 % external solution Apply topically 2 (two) times a day To scalp 50 mL 1    doxycycline hyclate (VIBRAMYCIN) 100 mg capsule   1    sitaGLIPtin (JANUVIA) 100 mg tablet Take 1 tablet (100 mg total) by mouth daily 30 tablet 5     No current facility-administered medications for this visit  Objective:    /80   Pulse 90   Temp 98 2 °F (36 8 °C)   Resp 18   Ht 5' 4" (1 626 m)   Wt 102 kg (225 lb 3 2 oz)   SpO2 98%   BMI 38 66 kg/m²        Physical Exam   Constitutional: She appears well-developed and well-nourished  Eyes: Conjunctivae are normal    Neck: Neck supple  No JVD present  No thyromegaly present  Cardiovascular: Normal rate, regular rhythm, normal heart sounds and intact distal pulses  Exam reveals no gallop and no friction rub  No murmur heard  Pulmonary/Chest: Effort normal and breath sounds normal  She has no wheezes  She has no rales  Abdominal: Soft  Bowel sounds are normal  She exhibits no distension  There is no tenderness  Musculoskeletal: She exhibits no edema  Assessment/Plan:    Anxiety  Well controlled on paxil with PRN lorazepam      Essential hypertension  Well controlled on olmesartan/hct  Hypothyroidism  She is not able to lose weight and we will check TSH, continue levothyroxine at current dose for now  Hyperlipemia, mixed  Will check labs, advised on continued diet and exercise  Type 2 diabetes mellitus without complication, without long-term current use of insulin (HCC)    Lab Results   Component Value Date    HGBA1C 7 9 (A) 10/28/2019     This is worsening  She has been on metformin in the past, prior to her bariatric surgery, and had a lot of GI side effects  She is unwilling to try this again  Will start [employer]  Advised again on low fat diet, exercise, weight loss  Advised on need for good foot and eye care    Follow up with A1C in the office in 3 months  Diagnoses and all orders for this visit:    Screening breast examination  -     Mammo screening bilateral w cad; Future    Essential hypertension  -     Olmesartan-amLODIPine-HCTZ 20-5-12 5 MG TABS; Take 1 tablet by mouth daily    Anxiety  -     PARoxetine (PAXIL) 30 mg tablet; Take 1 tablet (30 mg total) by mouth every morning    Hyperlipemia, mixed  -     CBC and differential; Future  -     Comprehensive metabolic panel; Future  -     Lipid panel; Future  -     CBC and differential  -     Comprehensive metabolic panel  -     Lipid panel    Hypothyroidism, unspecified type  -     TSH, 3rd generation with Free T4 reflex; Future  -     TSH, 3rd generation with Free T4 reflex    Type 2 diabetes mellitus without complication, without long-term current use of insulin (HCC)  -     CBC and differential; Future  -     Comprehensive metabolic panel; Future  -     Lipid panel; Future  -     IRIS Diabetic eye exam  -     POCT hemoglobin A1c  -     CBC and differential  -     Comprehensive metabolic panel  -     Lipid panel  -     sitaGLIPtin (JANUVIA) 100 mg tablet; Take 1 tablet (100 mg total) by mouth daily    Psoriasis  -     clobetasol (TEMOVATE) 0 05 % external solution; Apply topically 2 (two) times a day To scalp          Return in about 3 months (around 1/28/2020)         Carter Catalan MD

## 2019-10-28 ENCOUNTER — OFFICE VISIT (OUTPATIENT)
Dept: FAMILY MEDICINE CLINIC | Facility: CLINIC | Age: 44
End: 2019-10-28
Payer: COMMERCIAL

## 2019-10-28 VITALS
DIASTOLIC BLOOD PRESSURE: 80 MMHG | BODY MASS INDEX: 38.45 KG/M2 | HEART RATE: 90 BPM | OXYGEN SATURATION: 98 % | HEIGHT: 64 IN | RESPIRATION RATE: 18 BRPM | SYSTOLIC BLOOD PRESSURE: 122 MMHG | TEMPERATURE: 98.2 F | WEIGHT: 225.2 LBS

## 2019-10-28 DIAGNOSIS — Z12.39 SCREENING BREAST EXAMINATION: Primary | ICD-10-CM

## 2019-10-28 DIAGNOSIS — I10 ESSENTIAL HYPERTENSION: ICD-10-CM

## 2019-10-28 DIAGNOSIS — E78.2 HYPERLIPEMIA, MIXED: ICD-10-CM

## 2019-10-28 DIAGNOSIS — E03.9 HYPOTHYROIDISM, UNSPECIFIED TYPE: ICD-10-CM

## 2019-10-28 DIAGNOSIS — L40.9 PSORIASIS: ICD-10-CM

## 2019-10-28 DIAGNOSIS — E11.9 TYPE 2 DIABETES MELLITUS WITHOUT COMPLICATION, WITHOUT LONG-TERM CURRENT USE OF INSULIN (HCC): ICD-10-CM

## 2019-10-28 DIAGNOSIS — F41.9 ANXIETY: ICD-10-CM

## 2019-10-28 LAB
LEFT EYE DIABETIC RETINOPATHY: NORMAL
LEFT EYE IMAGE QUALITY: NORMAL
LEFT EYE MACULAR EDEMA: NORMAL
LEFT EYE OTHER RETINOPATHY: NORMAL
RIGHT EYE DIABETIC RETINOPATHY: NORMAL
RIGHT EYE IMAGE QUALITY: NORMAL
RIGHT EYE MACULAR EDEMA: NORMAL
RIGHT EYE OTHER RETINOPATHY: NORMAL
SEVERITY (EYE EXAM): NORMAL
SL AMB POCT HEMOGLOBIN AIC: 7.9 (ref ?–6.5)

## 2019-10-28 PROCEDURE — 3079F DIAST BP 80-89 MM HG: CPT | Performed by: INTERNAL MEDICINE

## 2019-10-28 PROCEDURE — 99214 OFFICE O/P EST MOD 30 MIN: CPT | Performed by: INTERNAL MEDICINE

## 2019-10-28 PROCEDURE — 3074F SYST BP LT 130 MM HG: CPT | Performed by: INTERNAL MEDICINE

## 2019-10-28 PROCEDURE — 36415 COLL VENOUS BLD VENIPUNCTURE: CPT | Performed by: INTERNAL MEDICINE

## 2019-10-28 PROCEDURE — 83036 HEMOGLOBIN GLYCOSYLATED A1C: CPT | Performed by: INTERNAL MEDICINE

## 2019-10-28 PROCEDURE — 3008F BODY MASS INDEX DOCD: CPT | Performed by: INTERNAL MEDICINE

## 2019-10-28 RX ORDER — PAROXETINE 30 MG/1
30 TABLET, FILM COATED ORAL EVERY MORNING
Qty: 30 TABLET | Refills: 5 | Status: SHIPPED | OUTPATIENT
Start: 2019-10-28 | End: 2020-03-30 | Stop reason: SDUPTHER

## 2019-10-28 RX ORDER — CLOBETASOL PROPIONATE 0.46 MG/ML
SOLUTION TOPICAL 2 TIMES DAILY
Qty: 50 ML | Refills: 1 | Status: SHIPPED | OUTPATIENT
Start: 2019-10-28 | End: 2020-03-30 | Stop reason: SDUPTHER

## 2019-10-28 RX ORDER — OLMESARTAN MEDOXOMIL, AMLODIPINE AND HYDROCHLOROTHIAZIDE TABLET 20/5/12.5 MG 20; 5; 12.5 MG/1; MG/1; MG/1
1 TABLET ORAL DAILY
Qty: 30 TABLET | Refills: 5 | Status: SHIPPED | OUTPATIENT
Start: 2019-10-28 | End: 2020-03-30 | Stop reason: SDUPTHER

## 2019-10-28 NOTE — ASSESSMENT & PLAN NOTE
"  Preventive Care at the 4 Month Visit  Growth Measurements & Percentiles  Head Circumference: 43.2 cm (17\") (91 %, Source: WHO (Boys, 0-2 years)) 91 %ile based on WHO (Boys, 0-2 years) head circumference-for-age based on Head Circumference recorded on 2019.   Weight: 13 lbs 11.8 oz / 6.23 kg (actual weight) 16 %ile based on WHO (Boys, 0-2 years) weight-for-age data based on Weight recorded on 2019.   Length: 2' 1\" / 63.5 cm 45 %ile based on WHO (Boys, 0-2 years) Length-for-age data based on Length recorded on 2019.   Weight for length: 10 %ile based on WHO (Boys, 0-2 years) weight-for-recumbent length based on body measurements available as of 2019.    Your baby s next Preventive Check-up will be at 6 months of age      Development    At this age, your baby may:    Raise his head high when lying on his stomach.    Raise his body on his hands when lying on his stomach.    Roll from his stomach to his back.    Play with his hands and hold a rattle.    Look at a mobile and move his hands.    Start social contact by smiling, cooing, laughing and squealing.    Cry when a parent moves out of sight.    Understand when a bottle is being prepared or getting ready to breastfeed and be able to wait for it for a short time.      Feeding Tips  Breast Milk    Nurse on demand     Check out the handout on Employed Breastfeeding Mother. https://www.lactationtraining.com/resources/educational-materials/handouts-parents/employed-breastfeeding-mother/download    Formula     Many babies feed 4 to 6 times per day, 6 to 8 oz at each feeding.    Don't prop the bottle.      Use a pacifier if the baby wants to suck.      Foods    It is often between 4-6 months that your baby will start watching you eat intently and then mouthing or grabbing for food. Follow her cues to start and stop eating.  Many people start by mixing rice cereal with breast milk or formula. Do not put cereal into a bottle.    To reduce your child's chance " Lab Results   Component Value Date    HGBA1C 7 9 (A) 10/28/2019     This is worsening  She has been on metformin in the past, prior to her bariatric surgery, and had a lot of GI side effects  She is unwilling to try this again  Will start [employer] [place]  Advised again on low fat diet, exercise, weight loss  Advised on need for good foot and eye care  Follow up with A1C in the office in 3 months  of developing peanut allergy, you can start introducing peanut-containing foods in small amounts around 6 months of age.  If your child has severe eczema, egg allergy or both, consult with your doctor first about possible allergy-testing and introduction of small amounts of peanut-containing foods at 4-6 months old.   Stools    If you give your baby pureéd foods, his stools may be less firm, occur less often, have a strong odor or become a different color.      Sleep    About 80 percent of 4-month-old babies sleep at least five to six hours in a row at night.  If your baby doesn t, try putting him to bed while drowsy/tired but awake.  Give your baby the same safe toy or blanket.  This is called a  transition object.   Do not play with or have a lot of contact with your baby at nighttime.    Your baby does not need to be fed if he wakes up during the night more frequently than every 5-6 hours.        Safety    The car seat should be in the rear seat facing backwards until your child weighs more than 20 pounds and turns 2 years old.    Do not let anyone smoke around your baby (or in your house or car) at any time.    Never leave your baby alone, even for a few seconds.  Your baby may be able to roll over.  Take any safety precautions.    Keep baby powders,  and small objects out of the baby s reach at all times.    Do not use infant walkers.  They can cause serious accidents and serve no useful purpose.  A better choice is an stationary exersaucer.      What Your Baby Needs    Give your baby toys that he can shake or bang.  A toy that makes noise as it s moved increases your baby s awareness.  He will repeat that activity.    Sing rhythmic songs or nursery rhymes.    Your baby may drool a lot or put objects into his mouth.  Make sure your baby is safe from small or sharp objects.    Read to your baby every night.

## 2019-10-28 NOTE — ASSESSMENT & PLAN NOTE
She is not able to lose weight and we will check TSH, continue levothyroxine at current dose for now

## 2019-11-03 LAB
ALBUMIN SERPL-MCNC: 4.1 G/DL (ref 3.5–5.5)
ALBUMIN/GLOB SERPL: 1.6 {RATIO} (ref 1.2–2.2)
ALP SERPL-CCNC: 99 IU/L (ref 39–117)
ALT SERPL-CCNC: 48 IU/L (ref 0–32)
AST SERPL-CCNC: 65 IU/L (ref 0–40)
BASOPHILS # BLD AUTO: 0 X10E3/UL (ref 0–0.2)
BASOPHILS NFR BLD AUTO: 1 %
BILIRUB SERPL-MCNC: 0.5 MG/DL (ref 0–1.2)
BUN SERPL-MCNC: 12 MG/DL (ref 6–24)
BUN/CREAT SERPL: 15 (ref 9–23)
CALCIUM SERPL-MCNC: 9.1 MG/DL (ref 8.7–10.2)
CHLORIDE SERPL-SCNC: 103 MMOL/L (ref 96–106)
CHOLEST SERPL-MCNC: 243 MG/DL (ref 100–199)
CHOLEST/HDLC SERPL: 4.9 RATIO (ref 0–4.4)
CO2 SERPL-SCNC: 24 MMOL/L (ref 20–29)
CREAT SERPL-MCNC: 0.78 MG/DL (ref 0.57–1)
EOSINOPHIL # BLD AUTO: 0.2 X10E3/UL (ref 0–0.4)
EOSINOPHIL NFR BLD AUTO: 3 %
ERYTHROCYTE [DISTWIDTH] IN BLOOD BY AUTOMATED COUNT: 13.4 % (ref 12.3–15.4)
GLOBULIN SER-MCNC: 2.6 G/DL (ref 1.5–4.5)
GLUCOSE SERPL-MCNC: 192 MG/DL (ref 65–99)
HCT VFR BLD AUTO: 40.2 % (ref 34–46.6)
HDLC SERPL-MCNC: 50 MG/DL
HGB BLD-MCNC: 13.9 G/DL (ref 11.1–15.9)
IMM GRANULOCYTES # BLD: 0 X10E3/UL (ref 0–0.1)
IMM GRANULOCYTES NFR BLD: 0 %
LDLC SERPL CALC-MCNC: 172 MG/DL (ref 0–99)
LYMPHOCYTES # BLD AUTO: 1.9 X10E3/UL (ref 0.7–3.1)
LYMPHOCYTES NFR BLD AUTO: 34 %
MCH RBC QN AUTO: 33.2 PG (ref 26.6–33)
MCHC RBC AUTO-ENTMCNC: 34.6 G/DL (ref 31.5–35.7)
MCV RBC AUTO: 96 FL (ref 79–97)
MICRODELETION SYND BLD/T FISH: NORMAL
MONOCYTES # BLD AUTO: 0.4 X10E3/UL (ref 0.1–0.9)
MONOCYTES NFR BLD AUTO: 6 %
NEUTROPHILS # BLD AUTO: 3.2 X10E3/UL (ref 1.4–7)
NEUTROPHILS NFR BLD AUTO: 56 %
PLATELET # BLD AUTO: 233 X10E3/UL (ref 150–450)
POTASSIUM SERPL-SCNC: 4 MMOL/L (ref 3.5–5.2)
PROT SERPL-MCNC: 6.7 G/DL (ref 6–8.5)
RBC # BLD AUTO: 4.19 X10E6/UL (ref 3.77–5.28)
SL AMB EGFR AFRICAN AMERICAN: 108 ML/MIN/1.73
SL AMB EGFR NON AFRICAN AMERICAN: 93 ML/MIN/1.73
SL AMB VLDL CHOLESTEROL CALC: 21 MG/DL (ref 5–40)
SODIUM SERPL-SCNC: 140 MMOL/L (ref 134–144)
TRIGL SERPL-MCNC: 106 MG/DL (ref 0–149)
TSH SERPL DL<=0.005 MIU/L-ACNC: 1.48 UIU/ML (ref 0.45–4.5)
WBC # BLD AUTO: 5.7 X10E3/UL (ref 3.4–10.8)

## 2019-12-12 DIAGNOSIS — E03.9 HYPOTHYROIDISM, UNSPECIFIED TYPE: ICD-10-CM

## 2019-12-12 RX ORDER — LEVOTHYROXINE SODIUM 112 UG/1
TABLET ORAL
Qty: 30 TABLET | Refills: 5 | Status: SHIPPED | OUTPATIENT
Start: 2019-12-12 | End: 2020-03-30 | Stop reason: SDUPTHER

## 2020-03-30 ENCOUNTER — TELEMEDICINE (OUTPATIENT)
Dept: FAMILY MEDICINE CLINIC | Facility: CLINIC | Age: 45
End: 2020-03-30

## 2020-03-30 VITALS — BODY MASS INDEX: 38.41 KG/M2 | HEIGHT: 64 IN | WEIGHT: 225 LBS

## 2020-03-30 DIAGNOSIS — E11.9 TYPE 2 DIABETES MELLITUS WITHOUT COMPLICATION, WITHOUT LONG-TERM CURRENT USE OF INSULIN (HCC): ICD-10-CM

## 2020-03-30 DIAGNOSIS — F41.9 ANXIETY: ICD-10-CM

## 2020-03-30 DIAGNOSIS — I10 ESSENTIAL HYPERTENSION: Primary | ICD-10-CM

## 2020-03-30 DIAGNOSIS — E03.9 HYPOTHYROIDISM, UNSPECIFIED TYPE: ICD-10-CM

## 2020-03-30 DIAGNOSIS — L40.9 PSORIASIS: ICD-10-CM

## 2020-03-30 DIAGNOSIS — E78.2 HYPERLIPEMIA, MIXED: ICD-10-CM

## 2020-03-30 PROCEDURE — G2012 BRIEF CHECK IN BY MD/QHP: HCPCS | Performed by: INTERNAL MEDICINE

## 2020-03-30 RX ORDER — LEVOTHYROXINE SODIUM 112 UG/1
112 TABLET ORAL EVERY MORNING
Qty: 30 TABLET | Refills: 5 | Status: SHIPPED | OUTPATIENT
Start: 2020-03-30

## 2020-03-30 RX ORDER — CLOBETASOL PROPIONATE 0.46 MG/ML
SOLUTION TOPICAL 2 TIMES DAILY
Qty: 50 ML | Refills: 1 | Status: SHIPPED | OUTPATIENT
Start: 2020-03-30 | End: 2022-03-31

## 2020-03-30 RX ORDER — PAROXETINE 30 MG/1
30 TABLET, FILM COATED ORAL EVERY MORNING
Qty: 30 TABLET | Refills: 5 | Status: SHIPPED | OUTPATIENT
Start: 2020-03-30 | End: 2020-05-13

## 2020-03-30 RX ORDER — OLMESARTAN MEDOXOMIL, AMLODIPINE AND HYDROCHLOROTHIAZIDE TABLET 20/5/12.5 MG 20; 5; 12.5 MG/1; MG/1; MG/1
1 TABLET ORAL DAILY
Qty: 30 TABLET | Refills: 5 | Status: SHIPPED | OUTPATIENT
Start: 2020-03-30 | End: 2021-03-30

## 2020-03-30 RX ORDER — CLOBETASOL PROPIONATE 0.5 MG/G
OINTMENT TOPICAL 2 TIMES DAILY
Qty: 30 G | Refills: 1 | Status: SHIPPED | OUTPATIENT
Start: 2020-03-30

## 2020-03-30 NOTE — ASSESSMENT & PLAN NOTE
She is due for labs but is clinically euthyroid at this time  She will get her labs done as soon as she is able and follow up in office thereafter

## 2020-03-30 NOTE — ASSESSMENT & PLAN NOTE
Lab Results   Component Value Date    HGBA1C 7 9 (A) 10/28/2019     This has not been well controlled  Labwork was ordered  She will continue [employer] [place] and was advised to follow low carb diet, exercise, try to lose some weight  Advised on need for good foot and eye care

## 2020-03-30 NOTE — PROGRESS NOTES
Virtual Brief Visit    Problem List Items Addressed This Visit        Endocrine    Hypothyroidism     She is due for labs but is clinically euthyroid at this time  She will get her labs done as soon as she is able and follow up in office thereafter  Relevant Medications    levothyroxine 112 mcg tablet    Other Relevant Orders    TSH, 3rd generation with Free T4 reflex    Type 2 diabetes mellitus without complication, without long-term current use of insulin (HCC)       Lab Results   Component Value Date    HGBA1C 7 9 (A) 10/28/2019     This has not been well controlled  Labwork was ordered  She will continue [employer] [facility] and was advised to follow low carb diet, exercise, try to lose some weight  Advised on need for good foot and eye care  Relevant Medications    sitaGLIPtin (JANUVIA) 100 mg tablet    Other Relevant Orders    CBC and differential    Comprehensive metabolic panel    Lipid panel    HEMOGLOBIN A1C W/ EAG ESTIMATION       Cardiovascular and Mediastinum    Essential hypertension - Primary     This has been well controlled in the office and she will continue her olmesartan HCT  Relevant Medications    Olmesartan-amLODIPine-HCTZ 20-5-12 5 MG TABS    Other Relevant Orders    CBC and differential    Comprehensive metabolic panel    Lipid panel       Musculoskeletal and Integument    Psoriasis     Currently flaring and I ordered her clobetasol  Relevant Medications    clobetasol (TEMOVATE) 0 05 % external solution    clobetasol (TEMOVATE) 0 05 % ointment       Other    Anxiety    Relevant Medications    PARoxetine (PAXIL) 30 mg tablet    Hyperlipemia, mixed     Will check lipids, advised again on need for diet, exercise, weight loss  Relevant Orders    CBC and differential    Comprehensive metabolic panel    Lipid panel                Reason for visit is follow up of DM and multiple medical conditions      Encounter provider Fred Dewitt MD    Provider located at [facility]  [facility]  [address]  [address]  [place] [address]      Recent Visits  No visits were found meeting these conditions  Showing recent visits within past 7 days and meeting all other requirements     Today's Visits  Date Type Provider Dept   03/30/20 [id number] [facility], [address] [place] [address] today's visits and meeting all other requirements     Future Appointments  No visits were found meeting these conditions  Showing future appointments within next 150 days and meeting all other requirements        After connecting through telephone, the patient was identified by name and date of birth  Lili Cuba was informed that this is a telemedicine visit and that the visit is being conducted through telephone  My office door was closed  No one else was in the room  She acknowledged consent and understanding of privacy and security of the video platform  The patient has agreed to participate and understands they can discontinue the visit at any time  Patient is aware this is a billable service  Subjective  Lili Cuba is a 41 y o  female with a history of DM2, hypertension, hyperlipidemia, among others  She is feeling well  She continues to work at [employer]  She wears an apple watch and has been getting 11,000 steps per day  She does not check her sugar or her bp  She is trying to follow a very low carb diet  She is overdue for labs  There are no foot or eye issues and she is up to date with eye check  Her psoriasis is acting up a bit which she blames on stress and she does need refills on her clobetasol      Past Medical History:   Diagnosis Date    Abnormal ECG 12/2015    Interventricular conduction relay disorder    Abnormal Pap smear of cervix Jan 2015    See medical records from Dr Stephie Osorio Disease of thyroid gland     hypo    HPV (human papilloma virus) infection Jan 2015    See medical records from Dr Elia Jefferson HTN (hypertension)     Irregular heart beat     interventricular   conduction relay disorder    Nausea 12/2015    since gastric sleeve    Ovarian cyst     Urinary tract infection     chronic issue    Varicella March 1985       Past Surgical History:   Procedure Laterality Date    BARIATRIC SURGERY  Dec 2015    Gastric Sleeve    CERVICAL BIOPSY N/A 3/2/2017    Procedure: BIOPSY LEEP CERVIX;  Surgeon: Brenda Mckinney MD;  Location: [address] [place] [address];  Service:    [facility] CERVICAL BIOPSY  W/ LOOP ELECTRODE EXCISION  Mar 2017    See medical records from Dr Elia Jefferson ESOPHAGOGASTRODUODENOSCOPY     [address]  12/2015    sleeve at age 45    TONSILLECTOMY      adenoids-age 35    WISDOM TOOTH EXTRACTION      x4-age 18       Current Outpatient Medications   Medication Sig Dispense Refill    BIOTIN PO Take 30,000 mcg by mouth every morning      Cetirizine HCl (ZYRTEC ALLERGY PO) Take 20 mg by mouth every morning      clobetasol (TEMOVATE) 0 05 % external solution Apply topically 2 (two) times a day To scalp 50 mL 1    Cyanocobalamin (B-12) 5000 MCG CAPS Take 5,000 mcg by mouth daily      ibuprofen (MOTRIN) 800 mg tablet Take 1 tablet (800 mg total) by mouth every 6 (six) hours as needed for mild pain (take with food) 20 tablet 0    levothyroxine 112 mcg tablet Take 1 tablet (112 mcg total) by mouth every morning 30 tablet 5    LORazepam (ATIVAN) 0 5 mg tablet Take 1 tablet (0 5 mg total) by mouth 2 (two) times a day as needed for anxiety 30 tablet 0    naproxen (NAPROSYN) 500 mg tablet Take 1 tablet (500 mg total) by mouth 2 (two) times a day with meals 30 tablet 0    Olmesartan-amLODIPine-HCTZ 20-5-12 5 MG TABS Take 1 tablet by mouth daily 30 tablet 5    PARoxetine (PAXIL) 30 mg tablet Take 1 tablet (30 mg total) by mouth every morning 30 tablet 5    sitaGLIPtin (JANUVIA) 100 mg tablet Take 1 tablet (100 mg total) by mouth daily 30 tablet 5    clindamycin (CLEOCIN T) 1 % lotion   1    clobetasol (TEMOVATE) 0 05 % ointment Apply topically 2 (two) times a day 30 g 1    doxycycline hyclate (VIBRAMYCIN) 100 mg capsule   1     No current facility-administered medications for this visit  Allergies   Allergen Reactions    [place] (Diagnostic) Anaphylaxis     All stone nuts  Lips/tongue swell-[place] allergy    Bee Venom Anaphylaxis     All over swelling/pain/redness    Soy Lecithin [Lecithin] Anaphylaxis     [place]/throat swelling-dyspnea  Pt is only allergic to SOY       Review of Systems   Constitutional: Negative  Respiratory: Negative  Cardiovascular: Negative  Gastrointestinal: Negative  Endocrine: Negative  I spent 10 minutes with the patient during this visit

## 2020-05-13 DIAGNOSIS — F41.9 ANXIETY: ICD-10-CM

## 2020-05-13 RX ORDER — PAROXETINE 30 MG/1
TABLET, FILM COATED ORAL
Qty: 30 TABLET | Refills: 3 | Status: SHIPPED | OUTPATIENT
Start: 2020-05-13

## 2021-03-30 ENCOUNTER — APPOINTMENT (EMERGENCY)
Dept: RADIOLOGY | Facility: HOSPITAL | Age: 46
End: 2021-03-30
Payer: COMMERCIAL

## 2021-03-30 ENCOUNTER — HOSPITAL ENCOUNTER (EMERGENCY)
Facility: HOSPITAL | Age: 46
Discharge: HOME | End: 2021-03-30
Attending: EMERGENCY MEDICINE
Payer: COMMERCIAL

## 2021-03-30 VITALS
RESPIRATION RATE: 20 BRPM | OXYGEN SATURATION: 97 % | WEIGHT: 233.8 LBS | DIASTOLIC BLOOD PRESSURE: 93 MMHG | HEART RATE: 94 BPM | SYSTOLIC BLOOD PRESSURE: 138 MMHG | TEMPERATURE: 97.1 F | BODY MASS INDEX: 40.13 KG/M2

## 2021-03-30 DIAGNOSIS — M25.511 RIGHT SHOULDER PAIN: Primary | ICD-10-CM

## 2021-03-30 PROCEDURE — 73030 X-RAY EXAM OF SHOULDER: CPT

## 2021-03-30 PROCEDURE — 99284 EMERGENCY DEPT VISIT MOD MDM: CPT | Performed by: EMERGENCY MEDICINE

## 2021-03-30 PROCEDURE — 99283 EMERGENCY DEPT VISIT LOW MDM: CPT

## 2021-03-30 RX ORDER — METHOCARBAMOL 500 MG/1
500 TABLET, FILM COATED ORAL 2 TIMES DAILY
Qty: 14 TABLET | Refills: 0 | Status: SHIPPED | OUTPATIENT
Start: 2021-03-30 | End: 2022-03-31

## 2021-03-30 RX ORDER — NAPROXEN 500 MG/1
500 TABLET ORAL ONCE
Status: COMPLETED | OUTPATIENT
Start: 2021-03-30 | End: 2021-03-30

## 2021-03-30 RX ORDER — METHOCARBAMOL 500 MG/1
500 TABLET, FILM COATED ORAL ONCE
Status: COMPLETED | OUTPATIENT
Start: 2021-03-30 | End: 2021-03-30

## 2021-03-30 RX ORDER — NAPROXEN 500 MG/1
500 TABLET ORAL 2 TIMES DAILY WITH MEALS
Qty: 14 TABLET | Refills: 0 | Status: SHIPPED | OUTPATIENT
Start: 2021-03-30 | End: 2022-03-31

## 2021-03-30 RX ADMIN — NAPROXEN 500 MG: 500 TABLET ORAL at 18:26

## 2021-03-30 RX ADMIN — METHOCARBAMOL 500 MG: 500 TABLET ORAL at 18:26

## 2021-03-31 NOTE — ED PROVIDER NOTES
History  Chief Complaint   Patient presents with    Shoulder Injury     States right shoulder problem years ago  States a antique heavy mirror was falling and she reached overhead to catch last week  Burning/searing pain right shoulder into neck  Patient presents for evaluation of right shoulder pain  Patient states a few years ago she her right shoulder when she tried to catch an antique year that was falling by reaching above her head  States since then she has had pain on and off for the year  This pain is been present since last Thursday Friday  Does not recall any new injury or trauma  Pain is in the anterior shoulder as well as the posterior shoulder radiates up to the neck as well  History provided by:  Patient   used: No    Shoulder Injury  Associated symptoms: neck pain    Associated symptoms: no back pain and no fever        Prior to Admission Medications   Prescriptions Last Dose Informant Patient Reported? Taking?    AMLODIPINE BESYLATE PO  Self Yes Yes   Sig: Take by mouth Dose unknown daily   BIOTIN PO  Self Yes No   Sig: Take 30,000 mcg by mouth every morning   Cetirizine HCl (ZYRTEC ALLERGY PO)  Self Yes No   Sig: Take 20 mg by mouth every morning   Cyanocobalamin (B-12) 5000 MCG CAPS   Yes No   Sig: Take 5,000 mcg by mouth daily   PARoxetine (PAXIL) 30 mg tablet   No No   Sig: TAKE ONE TABLET BY MOUTH EVERY DAY IN THE MORNING   clindamycin (CLEOCIN T) 1 % lotion   Yes No   clobetasol (TEMOVATE) 0 05 % external solution   No No   Sig: Apply topically 2 (two) times a day To scalp   clobetasol (TEMOVATE) 0 05 % ointment   No No   Sig: Apply topically 2 (two) times a day   ibuprofen (MOTRIN) 800 mg tablet   No No   Sig: Take 1 tablet (800 mg total) by mouth every 6 (six) hours as needed for mild pain (take with food)   levothyroxine 112 mcg tablet   No No   Sig: Take 1 tablet (112 mcg total) by mouth every morning   naproxen (NAPROSYN) 500 mg tablet   No No   Sig: Take 1 tablet (500 mg total) by mouth 2 (two) times a day with meals   sitaGLIPtin (JANUVIA) 100 mg tablet   No No   Sig: Take 1 tablet (100 mg total) by mouth daily      Facility-Administered Medications: None       Past Medical History:   Diagnosis Date    Abnormal ECG 2015    Interventricular conduction relay disorder    Abnormal Pap smear of cervix 2015    See medical records from Dr Margoth Duncan Disease of thyroid gland     hypo    HPV (human papilloma virus) infection 2015    See medical records from Dr Tano Meneses HTN (hypertension)     Irregular heart beat     interventricular  conduction relay disorder    Nausea 2015    since gastric sleeve    Ovarian cyst     Urinary tract infection     chronic issue    Varicella 1985       Past Surgical History:   Procedure Laterality Date    BARIATRIC SURGERY  Dec 2015    Gastric Sleeve    CERVICAL BIOPSY N/A 3/2/2017    Procedure: BIOPSY LEEP CERVIX;  Surgeon: Harper Singleton MD;  Location: [address] [place] [address];  Service:    [facility] CERVICAL BIOPSY  W/ LOOP ELECTRODE EXCISION  Mar 2017    See medical records from Dr Tano Meneses ESOPHAGOGASTRODUODENOSCOPY     [address]  2015    sleeve at age 45    TONSILLECTOMY      adenoids-age 35    WISDOM TOOTH EXTRACTION      x4-age 18       Family History   Problem Relation Age of Onset    Diabetes Mother         diet controlled    Diabetes Father         insulin    Other Father         agent orange     I have reviewed and agree with the history as documented  E-Cigarette/Vaping    E-Cigarette Use Never User      E-Cigarette/Vaping Substances     Social History     Tobacco Use    Smoking status: Former Smoker     Packs/day: 0 25     Years: 10 00     Pack years: 2 50     Quit date:      Years since quittin 1    Smokeless tobacco: Never Used   Substance Use Topics    Alcohol use:  Yes     Alcohol/week: 6 0 standard drinks     Types: 4 Standard drinks or equivalent per week     Comment: occasional    Drug use: Yes     Frequency: 7 0 times per week     Types: Marijuana     Comment: occasionally       Review of Systems   Constitutional: Negative for chills and fever  HENT: Negative for congestion and sore throat  Respiratory: Negative for cough and shortness of breath  Cardiovascular: Negative for chest pain  Gastrointestinal: Negative for abdominal pain, nausea and vomiting  Musculoskeletal: Positive for arthralgias and neck pain  Negative for back pain  Skin: Negative for rash  Neurological: Negative for weakness, numbness and headaches  All other systems reviewed and are negative  Physical Exam  Physical Exam  Vitals signs and nursing note reviewed  Constitutional:       General: She is not in acute distress  Appearance: Normal appearance  HENT:      Head: Atraumatic  Right Ear: External ear normal       Left Ear: External ear normal       Nose: Nose normal       Mouth/Throat:      Mouth: Mucous membranes are moist       Pharynx: Oropharynx is clear  Eyes:      General: No scleral icterus  Conjunctiva/sclera: Conjunctivae normal    Cardiovascular:      Rate and Rhythm: Normal rate and regular rhythm  Pulses: Normal pulses  Pulmonary:      Effort: Pulmonary effort is normal  No respiratory distress  Breath sounds: Normal breath sounds  No wheezing, rhonchi or rales  Musculoskeletal: Normal range of motion  General: Tenderness present  No swelling or deformity  Arms:    Skin:     Capillary Refill: Capillary refill takes less than 2 seconds  Findings: No rash  Neurological:      General: No focal deficit present  Mental Status: She is alert and oriented to person, place, and time  Cranial Nerves: No cranial nerve deficit  Sensory: No sensory deficit  Motor: No weakness           Vital Signs  ED Triage Vitals [03/30/21 1741]   Temperature Pulse Respirations Blood Pressure SpO2   (!) 97 1 °F (36 2 °C) 94 20 138/93 97 %      Temp Source Heart Rate Source Patient Position - Orthostatic VS BP Location FiO2 (%)   Tympanic Monitor Sitting Left arm --      Pain Score       9           Vitals:    03/30/21 1741   BP: 138/93   Pulse: 94   Patient Position - Orthostatic VS: Sitting         Visual Acuity      ED Medications  Medications   naproxen (NAPROSYN) tablet 500 mg (500 mg Oral Given 3/30/21 1826)   methocarbamol (ROBAXIN) tablet 500 mg (500 mg Oral Given 3/30/21 1826)       Diagnostic Studies  Results Reviewed     None                 XR shoulder 2+ views RIGHT    (Results Pending)              Procedures  Procedures         ED Course                                           MDM  Number of Diagnoses or Management Options  Right shoulder pain:   Diagnosis management comments: Pulse ox 97% on room air indicating adequate oxygenation  Xray R Shoulder: no fx or dislocation as read by me    Advised NSAIDs and muscle relaxers as follow-up to Orthopedics as outpatient  Amount and/or Complexity of Data Reviewed  Tests in the radiology section of CPT®: ordered and reviewed  Decide to obtain previous medical records or to obtain history from someone other than the patient: yes  Review and summarize past medical records: yes  Independent visualization of images, tracings, or specimens: yes    Patient Progress  Patient progress: stable      Disposition  Final diagnoses:   Right shoulder pain     Time reflects when diagnosis was documented in both MDM as applicable and the Disposition within this note     Time User Action Codes Description Comment    3/30/2021  6:43 PM Hi Hawthorne Add [E69 643] Right shoulder pain       ED Disposition     ED Disposition Condition Date/Time Comment    Discharge Stable Tue Mar 30, 2021  6:43 PM India Porter discharge to home/self care              Follow-up Information     Follow up With Specialties Details Why Contact Info    Elise Thompson MD Orthopedic Surgery In 1 week  [place] [id number]  [phone]            Discharge Medication List as of 3/30/2021  6:43 PM      START taking these medications    Details   methocarbamol (ROBAXIN) 500 mg tablet Take 1 tablet (500 mg total) by mouth 2 (two) times a day for 7 days, Starting Tue 3/30/2021, Until Tue 4/6/2021, Normal      !! naproxen (NAPROSYN) 500 mg tablet Take 1 tablet (500 mg total) by mouth 2 (two) times a day with meals for 7 days, Starting Tue 3/30/2021, Until Tue 4/6/2021, Normal       !! - Potential duplicate medications found  Please discuss with provider        CONTINUE these medications which have NOT CHANGED    Details   AMLODIPINE BESYLATE PO Take by mouth Dose unknown daily, Historical Med      BIOTIN PO Take 30,000 mcg by mouth every morning, Historical Med      Cetirizine HCl (ZYRTEC ALLERGY PO) Take 20 mg by mouth every morning, Historical Med      clindamycin (CLEOCIN T) 1 % lotion Starting Mon 10/15/2018, Historical Med      clobetasol (TEMOVATE) 0 05 % external solution Apply topically 2 (two) times a day To scalp, Starting Mon 3/30/2020, Normal      clobetasol (TEMOVATE) 0 05 % ointment Apply topically 2 (two) times a day, Starting Mon 3/30/2020, Normal      Cyanocobalamin (B-12) 5000 MCG CAPS Take 5,000 mcg by mouth daily, Historical Med      ibuprofen (MOTRIN) 800 mg tablet Take 1 tablet (800 mg total) by mouth every 6 (six) hours as needed for mild pain (take with food), Starting u 10/4/2018, Normal      levothyroxine 112 mcg tablet Take 1 tablet (112 mcg total) by mouth every morning, Starting Mon 3/30/2020, Normal      !! naproxen (NAPROSYN) 500 mg tablet Take 1 tablet (500 mg total) by mouth 2 (two) times a day with meals, Starting Sun 7/28/2019, Print      PARoxetine (PAXIL) 30 mg tablet TAKE ONE TABLET BY MOUTH EVERY DAY IN THE MORNING, Normal      sitaGLIPtin (JANUVIA) 100 mg tablet Take 1 tablet (100 mg total) by mouth daily, Starting Mon 3/30/2020, Normal       !! - Potential duplicate medications found  Please discuss with provider  No discharge procedures on file      PDMP Review     None          ED Provider  Electronically Signed by           Jung Israel DO  03/30/21 2234

## 2021-04-19 ENCOUNTER — TELEPHONE (OUTPATIENT)
Dept: FAMILY MEDICINE CLINIC | Facility: CLINIC | Age: 46
End: 2021-04-19

## 2021-04-27 NOTE — TELEPHONE ENCOUNTER
04/27/21 3:57 PM     Thank you for your request  Your request has been received, reviewed, and the patient chart updated  The PCP has successfully been removed with a patient attribution note  This message will now be completed      Thank you  Qasim Alarcon

## 2022-01-07 ENCOUNTER — TELEPHONE (OUTPATIENT)
Dept: GASTROENTEROLOGY | Facility: CLINIC | Age: 47
End: 2022-01-07

## 2022-01-29 ENCOUNTER — HOSPITAL ENCOUNTER (EMERGENCY)
Facility: HOSPITAL | Age: 47
Discharge: HOME | End: 2022-01-29
Attending: EMERGENCY MEDICINE
Payer: COMMERCIAL

## 2022-01-29 VITALS
HEART RATE: 78 BPM | DIASTOLIC BLOOD PRESSURE: 89 MMHG | SYSTOLIC BLOOD PRESSURE: 166 MMHG | BODY MASS INDEX: 38.86 KG/M2 | RESPIRATION RATE: 18 BRPM | WEIGHT: 226.4 LBS | OXYGEN SATURATION: 96 % | TEMPERATURE: 98.2 F

## 2022-01-29 DIAGNOSIS — K08.89 PAIN, DENTAL: Primary | ICD-10-CM

## 2022-01-29 PROCEDURE — 64450 NJX AA&/STRD OTHER PN/BRANCH: CPT | Performed by: EMERGENCY MEDICINE

## 2022-01-29 PROCEDURE — 99283 EMERGENCY DEPT VISIT LOW MDM: CPT

## 2022-01-29 PROCEDURE — 99284 EMERGENCY DEPT VISIT MOD MDM: CPT | Performed by: EMERGENCY MEDICINE

## 2022-01-29 RX ORDER — CLINDAMYCIN HYDROCHLORIDE 300 MG/1
300 CAPSULE ORAL 3 TIMES DAILY
COMMUNITY
End: 2022-03-31

## 2022-01-29 RX ORDER — LIDOCAINE HYDROCHLORIDE AND EPINEPHRINE BITARTRATE 20; .01 MG/ML; MG/ML
5 INJECTION, SOLUTION SUBCUTANEOUS ONCE
Status: COMPLETED | OUTPATIENT
Start: 2022-01-29 | End: 2022-01-29

## 2022-01-29 RX ORDER — BUPIVACAINE HYDROCHLORIDE 5 MG/ML
10 INJECTION, SOLUTION EPIDURAL; INTRACAUDAL ONCE
Status: COMPLETED | OUTPATIENT
Start: 2022-01-29 | End: 2022-01-29

## 2022-01-29 RX ORDER — OXYCODONE HYDROCHLORIDE AND ACETAMINOPHEN 5; 325 MG/1; MG/1
1 TABLET ORAL EVERY 4 HOURS PRN
COMMUNITY
End: 2022-03-31

## 2022-01-29 RX ADMIN — LIDOCAINE HYDROCHLORIDE AND EPINEPHRINE 5 ML: 20; 10 INJECTION, SOLUTION INFILTRATION; PERINEURAL at 18:24

## 2022-01-29 RX ADMIN — BUPIVACAINE HYDROCHLORIDE 10 ML: 5 INJECTION, SOLUTION EPIDURAL; INTRACAUDAL at 18:24

## 2022-01-29 NOTE — ED PROVIDER NOTES
History  Chief Complaint   Patient presents with    Dental Pain     crying  States left upper molar cracked under the crown, seen by dentist yesterday and patient on Clindamycin and Percocet  Had Percocet and 4 Advil at 1 pm and pain remains severe     67-year-old female presents to the ED for evaluation of toothache to the left upper jaw  Patient states that she has broken molar tooth to the left upper jaw that requires a root canal   Patient saw her dentist once last week and was prescribed Percocet as well as antibiotics  Pain increased and she was seen by dentist again yesterday who refilled patient's Percocet  Patient states that Percocet is not helping with her pain and she is having difficulty eating, as well as sleeping  Patient is actively crying in the emergency department secondary to pain  History provided by:  Patient  Dental Pain  Associated symptoms: no congestion, no fever and no headaches        Prior to Admission Medications   Prescriptions Last Dose Informant Patient Reported? Taking?    AMLODIPINE BESYLATE PO  Self Yes No   Sig: Take by mouth Dose unknown daily   BIOTIN PO  Self Yes No   Sig: Take 30,000 mcg by mouth every morning   Cetirizine HCl (ZYRTEC ALLERGY PO)  Self Yes No   Sig: Take 20 mg by mouth every morning   Cyanocobalamin (B-12) 5000 MCG CAPS   Yes No   Sig: Take 5,000 mcg by mouth daily   PARoxetine (PAXIL) 30 mg tablet   No No   Sig: TAKE ONE TABLET BY MOUTH EVERY DAY IN THE MORNING   clindamycin (CLEOCIN T) 1 % lotion   Yes No   clindamycin (CLEOCIN) 300 MG capsule 1/29/2022 at 1100 Self Yes Yes   Sig: Take 300 mg by mouth 3 (three) times a day   clobetasol (TEMOVATE) 0 05 % external solution   No No   Sig: Apply topically 2 (two) times a day To scalp   clobetasol (TEMOVATE) 0 05 % ointment   No No   Sig: Apply topically 2 (two) times a day   ibuprofen (MOTRIN) 800 mg tablet   No No   Sig: Take 1 tablet (800 mg total) by mouth every 6 (six) hours as needed for mild pain (take with food)   levothyroxine 112 mcg tablet   No No   Sig: Take 1 tablet (112 mcg total) by mouth every morning   methocarbamol (ROBAXIN) 500 mg tablet   No No   Sig: Take 1 tablet (500 mg total) by mouth 2 (two) times a day for 7 days   naproxen (NAPROSYN) 500 mg tablet   No No   Sig: Take 1 tablet (500 mg total) by mouth 2 (two) times a day with meals   naproxen (NAPROSYN) 500 mg tablet   No No   Sig: Take 1 tablet (500 mg total) by mouth 2 (two) times a day with meals for 7 days   oxyCODONE-acetaminophen (PERCOCET) 5-325 mg per tablet 1/29/2022 at 1300 Self Yes Yes   Sig: Take 1 tablet by mouth every 4 (four) hours as needed for moderate pain   sitaGLIPtin (JANUVIA) 100 mg tablet   No No   Sig: Take 1 tablet (100 mg total) by mouth daily      Facility-Administered Medications: None       Past Medical History:   Diagnosis Date    Abnormal ECG 12/2015    Interventricular conduction relay disorder    Abnormal Pap smear of cervix Jan 2015    See medical records from Dr Dalia High of thyroid gland     hypo    HPV (human papilloma virus) infection Jan 2015    See medical records from Dr Brice Becker HTN (hypertension)     Irregular heart beat     interventricular   conduction relay disorder    Nausea 12/2015    since gastric sleeve    Ovarian cyst     Urinary tract infection     chronic issue    Varicella March 1985       Past Surgical History:   Procedure Laterality Date    BARIATRIC SURGERY  Dec 2015    Gastric Sleeve    CERVICAL BIOPSY N/A 3/2/2017    Procedure: BIOPSY LEEP CERVIX;  Surgeon: Eli Sterling MD;  Location: [facility];  Service:    [facility] CERVICAL BIOPSY  W/ LOOP ELECTRODE EXCISION  Mar 2017    See medical records from Dr Brice Becker ESOPHAGOGASTRODUODENOSCOPY      GASTRIC RESTRICTION SURGERY  12/2015    sleeve at age 45    TONSILLECTOMY      adenoids-age 35    WISDOM TOOTH EXTRACTION      x4-age 18       Family History   Problem Relation Age of Onset    Diabetes Mother         diet controlled    Diabetes Father         insulin    Other Father         agent orange     I have reviewed and agree with the history as documented  E-Cigarette/Vaping    E-Cigarette Use Never User      E-Cigarette/Vaping Substances     Social History     Tobacco Use    Smoking status: Former Smoker     Packs/day: 0 25     Years: 10 00     Pack years: 2 50     Quit date:      Years since quittin 0    Smokeless tobacco: Never Used   Vaping Use    Vaping Use: Never used   Substance Use Topics    Alcohol use: Yes     Alcohol/week: 6 0 standard drinks     Types: 4 Standard drinks or equivalent per week     Comment: occasional    Drug use: Yes     Frequency: 7 0 times per week     Types: Marijuana     Comment: occasionally       Review of Systems   Constitutional: Negative for activity change, appetite change, chills and fever  HENT: Positive for dental problem  Negative for congestion and ear pain  Eyes: Negative for pain and discharge  Respiratory: Negative for cough, chest tightness, shortness of breath, wheezing and stridor  Cardiovascular: Negative for chest pain and palpitations  Gastrointestinal: Negative for abdominal distention, abdominal pain, constipation, diarrhea and nausea  Endocrine: Negative for cold intolerance  Genitourinary: Negative for dysuria, frequency and urgency  Musculoskeletal: Negative for arthralgias and back pain  Skin: Negative for color change and rash  Allergic/Immunologic: Negative for environmental allergies and food allergies  Neurological: Negative for dizziness, weakness, numbness and headaches  Hematological: Negative for adenopathy  Psychiatric/Behavioral: Negative for agitation, behavioral problems and confusion  The patient is not nervous/anxious  All other systems reviewed and are negative  Physical Exam  Physical Exam  Vitals and nursing note reviewed     Constitutional:       Appearance: She is well-developed  HENT:      Head: Normocephalic and atraumatic  Mouth/Throat:      Comments: Tenderness noted to palpation of left upper 1st molar tooth  Eyes:      Conjunctiva/sclera: Conjunctivae normal    Cardiovascular:      Rate and Rhythm: Normal rate and regular rhythm  Heart sounds: Normal heart sounds  Pulmonary:      Effort: Pulmonary effort is normal       Breath sounds: Normal breath sounds  Abdominal:      General: Bowel sounds are normal  There is no distension  Palpations: Abdomen is soft  Tenderness: There is no abdominal tenderness  Musculoskeletal:         General: Normal range of motion  Cervical back: Normal range of motion and neck supple  Skin:     General: Skin is warm and dry  Neurological:      Mental Status: She is alert and oriented to person, place, and time  Psychiatric:         Behavior: Behavior normal          Thought Content:  Thought content normal          Judgment: Judgment normal          Vital Signs  ED Triage Vitals [01/29/22 1753]   Temperature Pulse Respirations Blood Pressure SpO2   98 2 °F (36 8 °C) 78 18 166/89 96 %      Temp Source Heart Rate Source Patient Position - Orthostatic VS BP Location FiO2 (%)   Tympanic Monitor Sitting Left arm --      Pain Score       10 - Worst Possible Pain           Vitals:    01/29/22 1753   BP: 166/89   Pulse: 78   Patient Position - Orthostatic VS: Sitting         Visual Acuity      ED Medications  Medications   bupivacaine (PF) (MARCAINE) 0 5 % injection 10 mL (10 mL Infiltration Given 1/29/22 1824)   lidocaine-epinephrine (XYLOCAINE/EPINEPHRINE) 2 %-1:100,000 injection 5 mL (5 mL Infiltration Given 1/29/22 1824)       Diagnostic Studies  Results Reviewed     None                 No orders to display              Procedures  Nerve block    Date/Time: 1/29/2022 6:25 PM  Performed by: Jacques Teague DO  Authorized by: Jacques Teague DO     Patient location:  ED  [place] Protocol:  Procedure performed by: (ED nurse)  Risks and benefits: risks, benefits and alternatives were discussed  Consent given by: patient  Required items: required blood products, implants, devices, and special equipment available  Patient identity confirmed: verbally with patient      Indications:     Indications:  Pain relief  Location:     Body area:  Head    Head nerve blocked: Infraorbital, supra-periosteal for dental block  Laterality:  Left (Left upper jaw)  Skin anesthesia (see MAR for exact dosages):     Skin anesthesia method:  None  Procedure details (see MAR for exact dosages): Block needle gauge:  24 G    Anesthetic injected:  Bupivacaine 0 5% w/o epi and lidocaine 2% WITH epi    Injection procedure:  Anatomic landmarks identified  Post-procedure details:     Dressing:  None    Outcome:  Anesthesia achieved    Patient tolerance of procedure: Tolerated well, no immediate complications             ED Course  ED Course as of 01/29/22 1904   Sat Jan 29, 2022   4375 Patient re-examined at bedside  Patient did well with dental block without any adverse reactions  Patient's pain is completely gone after nerve block  MDM  Number of Diagnoses or Management Options  Risk of Complications, Morbidity, and/or Mortality  General comments: Patient presented today for dental pain  Patient is already on antibiotic as well as Percocet  Dental block applied for further pain control in the ED  Dental block was successful and patient's pain resolved  I discussed with patient that dental block is temporary and will provide about 8-10 hours of pain  Pain will return after dental block wears off  Patient is encouraged to continue her medication as prescribed by her dentist and follow-up with her dentist as soon as possible  Close return instructions given to return to the ER for any worsening symptoms  Patient agrees with discharge plan    Patient well appearing at time of discharge  Please Note: Fluency Direct voice recognition software may have been used in the creation of this document  Wrong words or sound a like substitutions may have occurred due to the inherent limitations of the voice software  Patient Progress  Patient progress: improved      Disposition  Final diagnoses:   Pain, dental     Time reflects when diagnosis was documented in both MDM as applicable and the Disposition within this note     Time User Action Codes Description Comment    1/29/2022  6:56 PM Salina Joaquin Altagracia [K08 89] Pain, dental       ED Disposition     ED Disposition Condition Date/Time Comment    Discharge Stable Sat Jan 29, 2022  6:56 PM Eder Reynolds discharge to home/self care  Follow-up Information     Follow up With Specialties Details Why Contact Info    Your dendist  Schedule an appointment as soon as possible for a visit             Patient's Medications   Discharge Prescriptions    No medications on file       No discharge procedures on file      PDMP Review     None          ED Provider  Electronically Signed by           Ron Watkins DO  01/29/22 7927

## 2022-03-31 ENCOUNTER — HOSPITAL ENCOUNTER (EMERGENCY)
Facility: HOSPITAL | Age: 47
Discharge: HOME | End: 2022-03-31
Attending: EMERGENCY MEDICINE
Payer: COMMERCIAL

## 2022-03-31 VITALS
TEMPERATURE: 97 F | OXYGEN SATURATION: 96 % | BODY MASS INDEX: 37.11 KG/M2 | WEIGHT: 216.2 LBS | HEART RATE: 75 BPM | RESPIRATION RATE: 18 BRPM | DIASTOLIC BLOOD PRESSURE: 93 MMHG | SYSTOLIC BLOOD PRESSURE: 142 MMHG

## 2022-03-31 DIAGNOSIS — L03.011 PARONYCHIA OF FINGER OF RIGHT HAND: Primary | ICD-10-CM

## 2022-03-31 PROCEDURE — 99283 EMERGENCY DEPT VISIT LOW MDM: CPT

## 2022-03-31 PROCEDURE — 99284 EMERGENCY DEPT VISIT MOD MDM: CPT | Performed by: EMERGENCY MEDICINE

## 2022-03-31 PROCEDURE — 10060 I&D ABSCESS SIMPLE/SINGLE: CPT | Performed by: EMERGENCY MEDICINE

## 2022-03-31 RX ORDER — CEPHALEXIN 500 MG/1
500 CAPSULE ORAL 3 TIMES DAILY
Qty: 21 CAPSULE | Refills: 0 | Status: SHIPPED | OUTPATIENT
Start: 2022-03-31 | End: 2022-04-07

## 2022-03-31 RX ORDER — CEPHALEXIN 500 MG/1
500 CAPSULE ORAL ONCE
Status: COMPLETED | OUTPATIENT
Start: 2022-03-31 | End: 2022-03-31

## 2022-03-31 RX ADMIN — CEPHALEXIN 500 MG: 500 CAPSULE ORAL at 19:52

## 2022-04-01 NOTE — ED PROVIDER NOTES
History  Chief Complaint   Patient presents with    Finger Pain     States she is having a problem with cuticle around right 4th finger for about 9 days, states clear drainage, applying neosporin      Patient presents for evaluation of finger pain  Right 4th digit starting 9 days ago  Trying soaks in the office morning with no improvement  Pain is around the cuticle  Denies any injury or trauma  History provided by:  Patient   used: No        Prior to Admission Medications   Prescriptions Last Dose Informant Patient Reported? Taking? AMLODIPINE BESYLATE PO  Self Yes No   Sig: Take by mouth Dose unknown daily   BIOTIN PO  Self Yes No   Sig: Take 30,000 mcg by mouth every morning   Cetirizine HCl (ZYRTEC ALLERGY PO)  Self Yes No   Sig: Take 20 mg by mouth every morning   Cyanocobalamin (B-12) 5000 MCG CAPS   Yes No   Sig: Take 5,000 mcg by mouth daily   PARoxetine (PAXIL) 30 mg tablet   No No   Sig: TAKE ONE TABLET BY MOUTH EVERY DAY IN THE MORNING   clobetasol (TEMOVATE) 0 05 % ointment   No No   Sig: Apply topically 2 (two) times a day   levothyroxine 112 mcg tablet   No No   Sig: Take 1 tablet (112 mcg total) by mouth every morning   sitaGLIPtin (JANUVIA) 100 mg tablet   No No   Sig: Take 1 tablet (100 mg total) by mouth daily      Facility-Administered Medications: None       Past Medical History:   Diagnosis Date    Abnormal ECG 12/2015    Interventricular conduction relay disorder    Abnormal Pap smear of cervix Jan 2015    See medical records from Dr Mayelin Torres Arthritis     hands    Disease of thyroid gland     hypo    HPV (human papilloma virus) infection Jan 2015    See medical records from Dr Dong Elizabeth HTN (hypertension)     Irregular heart beat     interventricular   conduction relay disorder    Nausea 12/2015    since gastric sleeve    Ovarian cyst     Urinary tract infection     chronic issue    Varicella March 1985       Past Surgical History: Procedure Laterality Date    BARIATRIC SURGERY  Dec 2015    Gastric Sleeve    CERVICAL BIOPSY N/A 3/2/2017    Procedure: BIOPSY LEEP CERVIX;  Surgeon: Christiana Vitale MD;  Location: [facility];  Service:     CERVICAL BIOPSY  W/ LOOP ELECTRODE EXCISION  Mar 2017    See medical records from Dr Michael Arriaga ESOPHAGOGASTRODUODENOSCOPY     [address]  2015    sleeve at age 45    TONSILLECTOMY      adenoids-age 35    WISDOM TOOTH EXTRACTION      x4-age 18       Family History   Problem Relation Age of Onset    Diabetes Mother         diet controlled    Diabetes Father         insulin    Other Father         agent orange     I have reviewed and agree with the history as documented  E-Cigarette/Vaping    E-Cigarette Use Never User      E-Cigarette/Vaping Substances     Social History     Tobacco Use    Smoking status: Former Smoker     Packs/day: 0 25     Years: 10 00     Pack years: 2 50     Quit date:      Years since quittin 1    Smokeless tobacco: Never Used   Vaping Use    Vaping Use: Never used   Substance Use Topics    Alcohol use: Yes     Alcohol/week: 6 0 standard drinks     Types: 4 Standard drinks or equivalent per week     Comment: occasional    Drug use: Yes     Frequency: 7 0 times per week     Types: Marijuana     Comment: occasionally       Review of Systems   Constitutional: Negative for chills and fever  HENT: Negative for ear pain and sore throat  Eyes: Negative for pain and visual disturbance  Respiratory: Negative for cough and shortness of breath  Cardiovascular: Negative for chest pain and palpitations  Gastrointestinal: Negative for abdominal pain and vomiting  Genitourinary: Negative for dysuria and hematuria  Musculoskeletal: Negative for arthralgias and back pain  Skin: Negative for color change and rash  Neurological: Negative for seizures and syncope  All other systems reviewed and are negative        Physical Exam  Physical Exam  Vitals and nursing note reviewed  Constitutional:       General: She is not in acute distress  HENT:      Head: Atraumatic  Right Ear: External ear normal       Left Ear: External ear normal       Nose: Nose normal       Mouth/Throat:      Mouth: Mucous membranes are moist       Pharynx: Oropharynx is clear  Eyes:      General: No scleral icterus  Conjunctiva/sclera: Conjunctivae normal    Cardiovascular:      Rate and Rhythm: Normal rate and regular rhythm  Pulmonary:      Effort: Pulmonary effort is normal  No respiratory distress  Breath sounds: Normal breath sounds  Musculoskeletal:         General: Swelling and tenderness present  No deformity  Normal range of motion  Hands:    Skin:     Capillary Refill: Capillary refill takes less than 2 seconds  Neurological:      General: No focal deficit present  Mental Status: She is alert and oriented to person, place, and time  Vital Signs  ED Triage Vitals [03/31/22 1915]   Temperature Pulse Respirations Blood Pressure SpO2   (!) 96 3 °F (35 7 °C) 81 18 153/95 96 %      Temp Source Heart Rate Source Patient Position - Orthostatic VS BP Location FiO2 (%)   Tympanic Monitor Sitting Left arm --      Pain Score       8           Vitals:    03/31/22 1915 03/31/22 2000   BP: 153/95 142/93   Pulse: 81 75   Patient Position - Orthostatic VS: Sitting          Visual Acuity      ED Medications  Medications   cephalexin (KEFLEX) capsule 500 mg (500 mg Oral Given 3/31/22 1952)       Diagnostic Studies  Results Reviewed     None                 No orders to display              Procedures  Incision and drain    Date/Time: 3/31/2022 9:39 PM  Performed by: Benjamin Mccall DO  Authorized by: Benjamin Mccall DO   Universal Protocol:  Consent given by: patient  Patient identity confirmed: verbally with patient and arm band      Patient location:  ED  Location:     Indications for incision and drainage: Paronychia      Location:  Upper extremity    Upper extremity location:  R ring finger  Pre-procedure details:     Skin preparation:  Chloraprep  Anesthesia (see MAR for exact dosages): Anesthesia method:  None  Procedure details:     Incision types:  Stab incision    Scalpel blade:  11    Incision depth:  Subcutaneous    Drainage:  Bloody    Drainage amount:  Scant    Wound treatment:  Wound left open    Packing materials:  None  Post-procedure details:     Patient tolerance of procedure: Tolerated well, no immediate complications             ED Course                                             MDM  Number of Diagnoses or Management Options  Paronychia of finger of right hand  Diagnosis management comments: Pulse ox 96% on room air indicating adequate oxygenation  Amount and/or Complexity of Data Reviewed  Decide to obtain previous medical records or to obtain history from someone other than the patient: yes  Review and summarize past medical records: yes    Patient Progress  Patient progress: stable      Disposition  Final diagnoses:   Paronychia of finger of right hand - ring finger     Time reflects when diagnosis was documented in both MDM as applicable and the Disposition within this note     Time User Action Codes Description Comment    3/31/2022  7:48 PM Mateus Campbell Add [L03 011] Paronychia of finger of right hand     3/31/2022  7:48 PM Mateus Campbell Modify [L03 011] Paronychia of finger of right hand ring finger      ED Disposition     ED Disposition Condition Date/Time Comment    Discharge Stable u Mar 31, 2022  7:48 PM Deisy Payan discharge to home/self care              Follow-up Information     Follow up With Specialties Details Why Contact Info    [employer]  In 4 days For wound re-check [phone]            Discharge Medication List as of 3/31/2022  7:49 PM      START taking these medications    Details   cephalexin (KEFLEX) 500 mg capsule Take 1 capsule (500 mg total) by mouth 3 (three) times a day for 7 days, Starting Thu 3/31/2022, Until Thu 4/7/2022, Normal         CONTINUE these medications which have NOT CHANGED    Details   AMLODIPINE BESYLATE PO Take by mouth Dose unknown daily, Historical Med      BIOTIN PO Take 30,000 mcg by mouth every morning, Historical Med      Cetirizine HCl (ZYRTEC ALLERGY PO) Take 20 mg by mouth every morning, Historical Med      clobetasol (TEMOVATE) 0 05 % ointment Apply topically 2 (two) times a day, Starting Mon 3/30/2020, Normal      Cyanocobalamin (B-12) 5000 MCG CAPS Take 5,000 mcg by mouth daily, Historical Med      levothyroxine 112 mcg tablet Take 1 tablet (112 mcg total) by mouth every morning, Starting Mon 3/30/2020, Normal      PARoxetine (PAXIL) 30 mg tablet TAKE ONE TABLET BY MOUTH EVERY DAY IN THE MORNING, Normal      sitaGLIPtin (JANUVIA) 100 mg tablet Take 1 tablet (100 mg total) by mouth daily, Starting Mon 3/30/2020, Normal             No discharge procedures on file      PDMP Review     None          ED Provider  Electronically Signed by           Alejandra Rudd DO  03/31/22 6474

## 2022-11-19 ENCOUNTER — APPOINTMENT (OUTPATIENT)
Dept: RADIOLOGY | Facility: HOSPITAL | Age: 47
End: 2022-11-19

## 2022-11-19 ENCOUNTER — HOSPITAL ENCOUNTER (EMERGENCY)
Facility: HOSPITAL | Age: 47
Discharge: HOME | End: 2022-11-19
Attending: EMERGENCY MEDICINE

## 2022-11-19 VITALS
RESPIRATION RATE: 18 BRPM | TEMPERATURE: 98.5 F | SYSTOLIC BLOOD PRESSURE: 162 MMHG | BODY MASS INDEX: 36.84 KG/M2 | DIASTOLIC BLOOD PRESSURE: 100 MMHG | OXYGEN SATURATION: 98 % | WEIGHT: 214.6 LBS | HEART RATE: 70 BPM

## 2022-11-19 DIAGNOSIS — S89.92XA INJURY OF LEFT KNEE, INITIAL ENCOUNTER: Primary | ICD-10-CM

## 2022-11-19 NOTE — Clinical Note
Boby Tabaresjose r was seen and treated in our emergency department on 11/19/2022  Diagnosis:     Delmis Perea  may return to work on return date  She may return on this date: 11/23/2022         If you have any questions or concerns, please don't hesitate to call        Karl Parker DO    ______________________________           _______________          _______________  Hospital Representative                              Date                                Time

## 2022-11-19 NOTE — ED PROVIDER NOTES
History  Chief Complaint   Patient presents with   • Knee Injury     States last night she stepped off a step and hyperextended her left knee and experienced " searing , burning pain "  States the pain makes her nauseous and could not take Tylenol or Motrin for pain      23-year-old female presents the ED states last night she stepped off a step and when she did her foot planted in her knee hyperextended on the left side  States she experienced pain in burning has had some pain causing nausea since that time  No fevers chills vomiting or diarrhea  Patient is able to extend her leg fully however she states that is painful in his in that position  Patient is ambulatory with pain      History provided by:  Patient   used: No        Prior to Admission Medications   Prescriptions Last Dose Informant Patient Reported? Taking?    AMLODIPINE BESYLATE PO   Yes No   Sig: Take by mouth Dose unknown daily   BIOTIN PO   Yes No   Sig: Take 30,000 mcg by mouth every morning   Cetirizine HCl (ZYRTEC ALLERGY PO)   Yes No   Sig: Take 20 mg by mouth every morning   Cyanocobalamin (B-12) 5000 MCG CAPS   Yes No   Sig: Take 5,000 mcg by mouth daily   PARoxetine (PAXIL) 30 mg tablet   No No   Sig: TAKE ONE TABLET BY MOUTH EVERY DAY IN THE MORNING   clobetasol (TEMOVATE) 0 05 % ointment   No No   Sig: Apply topically 2 (two) times a day   levothyroxine 112 mcg tablet   No No   Sig: Take 1 tablet (112 mcg total) by mouth every morning   sitaGLIPtin (JANUVIA) 100 mg tablet   No No   Sig: Take 1 tablet (100 mg total) by mouth daily      Facility-Administered Medications: None       Past Medical History:   Diagnosis Date   • Abnormal ECG 12/2015    Interventricular conduction relay disorder   • Abnormal Pap smear of cervix Jan 2015    See medical records from Dr Adalberto Aldrich   • Anxiety    • Arthritis     hands   • Disease of thyroid gland     hypo   • HPV (human papilloma virus) infection Jan 2015    See medical records from Dr Hilario Ladmartha   • HTN (hypertension)    • Irregular heart beat     interventricular  conduction relay disorder   • Nausea 2015    since gastric sleeve   • Ovarian cyst    • Urinary tract infection     chronic issue   • Varicella 1985       Past Surgical History:   Procedure Laterality Date   • BARIATRIC SURGERY  Dec 2015    Gastric Sleeve   • CERVICAL BIOPSY N/A 3/2/2017    Procedure: BIOPSY LEEP CERVIX;  Surgeon: Lila Rojas MD;  Location: [facility];  Service:    • CERVICAL BIOPSY  W/ LOOP ELECTRODE EXCISION  Mar 2017    See medical records from Dr Yanelis Rogers   • ESOPHAGOGASTRODUODENOSCOPY     • GASTRIC RESTRICTION SURGERY  2015    sleeve at age 45   • TONSILLECTOMY      adenoids-age 35   • WISDOM TOOTH EXTRACTION      x4-age 17       Family History   Problem Relation Age of Onset   • Diabetes Mother         diet controlled   • Diabetes Father         insulin   • Other Father         agent orange     I have reviewed and agree with the history as documented  E-Cigarette/Vaping   • E-Cigarette Use Never User      E-Cigarette/Vaping Substances     Social History     Tobacco Use   • Smoking status: Former     Packs/day: 0 25     Years: 10 00     Pack years: 2 50     Types: Cigarettes     Quit date:      Years since quittin 8   • Smokeless tobacco: Never   Vaping Use   • Vaping Use: Never used   Substance Use Topics   • Alcohol use: Yes     Alcohol/week: 6 0 standard drinks     Types: 4 Standard drinks or equivalent per week     Comment: occasional   • Drug use: Yes     Frequency: 7 0 times per week     Types: Marijuana     Comment: occasionally       Review of Systems   Constitutional: Negative for activity change, chills, diaphoresis and fever  HENT: Negative for congestion, ear pain, nosebleeds, sore throat, trouble swallowing and voice change  Eyes: Negative for pain, discharge and redness  Respiratory: Negative for apnea, cough, choking, shortness of breath, wheezing and stridor  Cardiovascular: Negative for chest pain and palpitations  Gastrointestinal: Negative for abdominal distention, abdominal pain, constipation, diarrhea, nausea and vomiting  Endocrine: Negative for polydipsia  Genitourinary: Negative for difficulty urinating, dysuria, flank pain, frequency, hematuria and urgency  Musculoskeletal: Positive for arthralgias, gait problem and joint swelling  Negative for back pain, myalgias, neck pain and neck stiffness  Skin: Negative for pallor and rash  Neurological: Negative for dizziness, tremors, syncope, speech difficulty, weakness, numbness and headaches  Hematological: Negative for adenopathy  Psychiatric/Behavioral: Negative for confusion, hallucinations, self-injury and suicidal ideas  The patient is not nervous/anxious  Physical Exam  Physical Exam  Vitals and nursing note reviewed  Constitutional:       General: She is not in acute distress  Appearance: She is well-developed and well-nourished  She is not diaphoretic  HENT:      Head: Normocephalic and atraumatic  Right Ear: External ear normal       Left Ear: External ear normal       Nose: Nose normal       Mouth/Throat:      Mouth: Oropharynx is clear and moist    Eyes:      Conjunctiva/sclera: Conjunctivae normal       Pupils: Pupils are equal, round, and reactive to light  Cardiovascular:      Rate and Rhythm: Normal rate and regular rhythm  Pulses: Intact distal pulses  Heart sounds: Normal heart sounds  Pulmonary:      Effort: Pulmonary effort is normal       Breath sounds: Normal breath sounds  Abdominal:      General: Bowel sounds are normal       Palpations: Abdomen is soft  Musculoskeletal:         General: Swelling, tenderness and signs of injury present  Normal range of motion  Cervical back: Normal range of motion and neck supple  Comments: Patient has pain diffusely around the left knee  Skin:     General: Skin is warm and dry     Neurological: Mental Status: She is alert and oriented to person, place, and time  Deep Tendon Reflexes: Reflexes are normal and symmetric  Psychiatric:         Mood and Affect: Mood and affect normal          Vital Signs  ED Triage Vitals [11/19/22 1557]   Temperature Pulse Respirations Blood Pressure SpO2   98 5 °F (36 9 °C) 70 18 162/100 98 %      Temp Source Heart Rate Source Patient Position - Orthostatic VS BP Location FiO2 (%)   Tympanic Monitor Sitting Left arm --      Pain Score       8           Vitals:    11/19/22 1557   BP: 162/100   Pulse: 70   Patient Position - Orthostatic VS: Sitting         Visual Acuity      ED Medications  Medications - No data to display    Diagnostic Studies  Results Reviewed     None                 XR knee 4+ views LEFT    (Results Pending)              Procedures  Procedures         ED Course                                             MDM  Number of Diagnoses or Management Options  Injury of left knee, initial encounter  Diagnosis management comments: I have requested Radiology to give me a stat read on the left knee x-ray  Patient will go in knee immobilizer and crutches and follow-up with orthopedics this week  Disposition  Final diagnoses:   Injury of left knee, initial encounter     Time reflects when diagnosis was documented in both MDM as applicable and the Disposition within this note     Time User Action Codes Description Comment    11/19/2022  4:50 PM Rosy Castanon Add [[id number]] Injury of left knee, initial encounter       ED Disposition     ED Disposition   Discharge    Condition   Stable    Date/Time   Sat Nov 19, 2022  4:50 PM    Jose Luis Flores discharge to home/self care                 Follow-up Information     Follow up With Specialties Details Why Jackie [address]ca 73 , DO Orthopedic Surgery Schedule an appointment as soon as possible for a visit  As needed [address]  [phone] Patient's Medications   Discharge Prescriptions    No medications on file       No discharge procedures on file      PDMP Review     None          ED Provider  Electronically Signed by           Briseyda Lozoya DO  11/19/22 2075

## 2023-01-24 ENCOUNTER — HOSPITAL ENCOUNTER (EMERGENCY)
Facility: HOSPITAL | Age: 48
Discharge: HOME | End: 2023-01-25
Attending: EMERGENCY MEDICINE

## 2023-01-24 ENCOUNTER — APPOINTMENT (EMERGENCY)
Dept: RADIOLOGY | Facility: HOSPITAL | Age: 48
End: 2023-01-24

## 2023-01-24 VITALS
RESPIRATION RATE: 20 BRPM | SYSTOLIC BLOOD PRESSURE: 136 MMHG | DIASTOLIC BLOOD PRESSURE: 85 MMHG | TEMPERATURE: 98.7 F | HEART RATE: 77 BPM | OXYGEN SATURATION: 97 % | BODY MASS INDEX: 36.56 KG/M2 | WEIGHT: 213 LBS

## 2023-01-24 DIAGNOSIS — S92.902A MULTIPLE CLOSED FRACTURES OF LEFT FOOT, INITIAL ENCOUNTER: Primary | ICD-10-CM

## 2023-01-24 RX ORDER — LIDOCAINE 50 MG/G
1 PATCH TOPICAL ONCE
Status: DISCONTINUED | OUTPATIENT
Start: 2023-01-24 | End: 2023-01-25 | Stop reason: HOSPADM

## 2023-01-24 RX ORDER — NAPROXEN 500 MG/1
500 TABLET ORAL 2 TIMES DAILY WITH MEALS
Qty: 30 TABLET | Refills: 0 | Status: SHIPPED | OUTPATIENT
Start: 2023-01-24

## 2023-01-24 RX ORDER — MORPHINE SULFATE 15 MG/1
15 TABLET ORAL EVERY 8 HOURS
Qty: 4 TABLET | Refills: 0 | Status: SHIPPED | OUTPATIENT
Start: 2023-01-24 | End: 2023-01-26

## 2023-01-24 RX ORDER — ACETAMINOPHEN 325 MG/1
975 TABLET ORAL ONCE
Status: COMPLETED | OUTPATIENT
Start: 2023-01-24 | End: 2023-01-24

## 2023-01-24 RX ORDER — MORPHINE SULFATE 15 MG/1
15 TABLET ORAL ONCE
Status: COMPLETED | OUTPATIENT
Start: 2023-01-24 | End: 2023-01-24

## 2023-01-24 RX ADMIN — MORPHINE SULFATE 15 MG: 15 TABLET ORAL at 23:54

## 2023-01-24 RX ADMIN — LIDOCAINE 1 PATCH: 50 PATCH CUTANEOUS at 22:52

## 2023-01-24 RX ADMIN — ACETAMINOPHEN 975 MG: 325 TABLET, FILM COATED ORAL at 22:51

## 2023-01-29 NOTE — ED PROVIDER NOTES
Final Diagnosis:  1  Multiple closed fractures of left foot, initial encounter        Chief Complaint   Patient presents with   • Foot Injury     States walking in dark, stepped on items on floor and she slipped and her toes left foot went under her and she heard a crack  Occurred at 2130, denies other injuries  HPI  ruth presents withfoot pain  Left side  Slipped and landed on toes and heard a crack  No other injuries  Didn't strike head  No anticoagulation  No injuries in arms where she caught herself etc  No skin breaks in the foot  Still ROM with toes and still sensate and with good cap refil  EMS reports if relevant:  **    Chart review reveals :     Office Visit on 10/28/2019   Component Date Value Ref Range Status   • Severity 10/28/2019 NORMAL   Final   • Right Eye Diabetic Retinopathy 10/28/2019 None   Final   • Right Eye Macular Edema 10/28/2019 None   Final   • Right Eye Other Retinopathy 10/28/2019 None   Final   • Right Eye Image Quality 10/28/2019 Gradeable Image   Final   • Left Eye Diabetic Retinopathy 10/28/2019 None   Final   • Left Eye Macular Edema 10/28/2019 None   Final   • Left Eye Other Retinopathy 10/28/2019 None   Final   • Left Eye Image Quality 10/28/2019 Gradeable Image   Final   • Result 10/28/2019 Retinal Study Result for Kenya Cuevas   Final   • Result 10/28/2019 Kenya Cuevas, a 39 y/o, F (: 1975, MRN: [id number])   Final   • Result 10/28/2019 [facility] for a retinal imaging study of the left and right eyes  Final   • Result 10/28/2019 Based on the findings of the study, the following is recommended for Kenya Cuevas   Final   • Result 10/28/2019 Normal Study: Return for follow up exam in 12 months or next calendar year     Final   • Result 10/28/2019 Interpreting Provider's Comments:  No comments provided   Final   • Result 10/28/2019 Diagnoses Present: E11 9 - Type 2 diabetes mellitus without complications Final   • Result 10/28/2019 Right Eye Findings:   Final   • Result 10/28/2019 Normal Result  Negative for Diabetic Retinopathy  Final   • Result 10/28/2019 Left Eye Findings:   Final   • Result 10/28/2019 Normal Result  Negative for Diabetic Retinopathy  Final   • Result 10/28/2019 This result was electronically signed by Ajit Alvarado MD, NPI: [id number], Taxonomy: [id number] on 10- 12:30:16 [facility] time  Final   • Result 10/28/2019 NOTE:  Any pathology noted on this diabetic retinal evaluation should be confirmed by an appropriate ophthalmic examination     Final   • Hemoglobin A1C 10/28/2019 7 9 (A)  6 5 Final   • White Blood Cell Count 11/02/2019 5 7  3 4 - 10 8 x10E3/uL Final   • Red Blood Cell Count 11/02/2019 4 19  3 77 - 5 28 x10E6/uL Final   • Hemoglobin 11/02/2019 13 9  11 1 - 15 9 g/dL Final   • HCT 11/02/2019 40 2  34 0 - 46 6 % Final   • MCV 11/02/2019 96  79 - 97 fL Final   • MCH 11/02/2019 33 2 (H)  26 6 - 33 0 pg Final   • MCHC 11/02/2019 34 6  31 5 - 35 7 g/dL Final   • RDW 11/02/2019 13 4  12 3 - 15 4 % Final   • Platelet Count 88/06/1814 233  150 - 450 x10E3/uL Final   • Neutrophils 11/02/2019 56  Not Estab  % Final   • Lymphocytes 11/02/2019 34  Not Estab  % Final   • Monocytes 11/02/2019 6  Not Estab  % Final   • Eosinophils 11/02/2019 3  Not Estab  % Final   • Basophils PCT 11/02/2019 1  Not Estab  % Final   • Neutrophils (Absolute) 11/02/2019 3 2  1 4 - 7 0 x10E3/uL Final   • Lymphocytes (Absolute) 11/02/2019 1 9  0 7 - 3 1 x10E3/uL Final   • Monocytes (Absolute) 11/02/2019 0 4  0 1 - 0 9 x10E3/uL Final   • Eosinophils (Absolute) 11/02/2019 0 2  0 0 - 0 4 x10E3/uL Final   • Basophils ABS 11/02/2019 0 0  0 0 - 0 2 x10E3/uL Final   • Immature Granulocytes 11/02/2019 0  Not Estab  % Final   • Immature Granulocytes (Absolute) 11/02/2019 0 0  0 0 - 0 1 x10E3/uL Final   • Glucose, Random 11/02/2019 192 (H)  65 - 99 mg/dL Final   • BUN 11/02/2019 12  6 - 24 mg/dL Final   • Creatinine 11/02/2019 0 78  0 57 - 1 00 mg/dL Final   • eGFR Non African American 11/02/2019 93  >59 mL/min/1 73 Final   • eGFR African American 11/02/2019 108  >59 mL/min/1 73 Final   • SL AMB BUN/CREATININE RATIO 11/02/2019 15  9 - 23 Final   • Sodium 11/02/2019 140  134 - 144 mmol/L Final   • Potassium 11/02/2019 4 0  3 5 - 5 2 mmol/L Final   • Chloride 11/02/2019 103  96 - 106 mmol/L Final   • CO2 11/02/2019 24  20 - 29 mmol/L Final   • CALCIUM 11/02/2019 9 1  8 7 - 10 2 mg/dL Final   • Protein, Total 11/02/2019 6 7  6 0 - 8 5 g/dL Final   • Albumin 11/02/2019 4 1  3 5 - 5 5 g/dL Final   • Globulin, Total 11/02/2019 2 6  1 5 - 4 5 g/dL Final   • Albumin/Globulin Ratio 11/02/2019 1 6  1 2 - 2 2 Final   • TOTAL BILIRUBIN 11/02/2019 0 5  0 0 - 1 2 mg/dL Final   • Alk Phos Isoenzymes 11/02/2019 99  39 - 117 IU/L Final   • AST 11/02/2019 65 (H)  0 - 40 IU/L Final   • ALT 11/02/2019 48 (H)  0 - 32 IU/L Final   • Cholesterol, Total 11/02/2019 243 (H)  100 - 199 mg/dL Final   • Triglycerides 11/02/2019 106  0 - 149 mg/dL Final   • HDL 11/02/2019 50  >39 mg/dL Final   • VLDL Cholesterol Calculated 11/02/2019 21  5 - 40 mg/dL Final   • LDL Calculated 11/02/2019 172 (H)  0 - 99 mg/dL Final   • T  Chol/HDL Ratio 11/02/2019 4 9 (H)  0 0 - 4 4 ratio Final    Comment:                                   T  Chol/HDL Ratio                                              Men  Women                                1/2 Avg  Risk  3 4    3 3                                    Avg Risk  5 0    4 4                                 2X Avg  Risk  9 6    7 1                                 3X Avg  Risk 23 4   11 0     • TSH 11/02/2019 1 480  0 450 - 4 500 uIU/mL Final   • Interpretation 11/02/2019 Note   Final    Supplemental report is available  - No language barrier    - History obtained from patient   - There are no limitations to the history obtained    - Previous charting underwent limited review with attention to last ED visits, labs, ekgs, and prior imaging  PMH:   has a past medical history of Abnormal ECG (12/2015), Abnormal Pap smear of cervix (Jan 2015), Anxiety, Arthritis, Disease of thyroid gland, HPV (human papilloma virus) infection (Jan 2015), HTN (hypertension), Irregular heart beat, Nausea (12/2015), Ovarian cyst, Urinary tract infection, and Varicella (March 1985)  PSH:   has a past surgical history that includes Gastric restriction surgery (12/2015); [place] tooth extraction; Tonsillectomy; Esophagogastroduodenoscopy; Cervical biopsy (N/A, 3/2/2017); Cervical biopsy w/ loop electrode excision (Mar 2017); and Bariatric Surgery (Dec 2015)  Social History:  Tobacco Use: Medium Risk   • Smoking Tobacco Use: Former   • Smokeless Tobacco Use: Never   • Passive Exposure: Not on file     Alcohol Use: Not on file     Patient with no illicit use   Patient arrives with     ROS:  Pertinent positives/negatives: Farida Guardado Some ROS may be present in the HPI and would take precedent over these standard questions asked below  Review of Systems     CONSTITUTIONAL:  No fatigue  No lethargy  No weakness  No unexpected weight loss  No appetite change  EYES:  No pain, erythema, or discharge  No loss of vision  No orbital trauma or pain  ENT:  No tinnitus or decreased hearing  No epistaxis/purulent rhinorrhea  No voice change, airway closing, trismus  CARDIOVASCULAR:  No chest pain  No skin mottling or pallor  No change in exertional capacity  RESPIRATORY:  No hemoptysis  No paroxysmal nocturnal dyspnea  No stridor  No audible wheezing  No production with cough  GASTROINTESTINAL:  Normal appetite  No vomiting, diarrhea  No pain  No bloating  No melena  No hematochezia  GENITOURINARY:  No frequency, urgency, nocturia  No hematuria or dysuria  No discharge  No sores/adenopathy  MUSCULOSKELETAL:  No arthralgias or myalgias that are new  No new deformity  Pain in foot  INTEGUMENTARY:  No swelling  No unexpected contusions  No abrasions   No lymphangitis  NEUROLOGIC:  No meningismus  No new numbness of the extremities  No new focal weakness  No postural instability  PSYCHIATRIC:  No SI HI AVH  HEMATOLOGICAL:  No bleeding  No petechiae  No bruising  ALLERGIES:  No urticaria  No sudden abd cramping  No stridor  PE:     Physical exam highlights:   Physical Exam       Vitals:    01/24/23 2230   BP: 136/85   BP Location: Left arm   Pulse: 77   Resp: 20   Temp: 98 7 °F (37 1 °C)   TempSrc: Tympanic   SpO2: 97%   Weight: 96 6 kg (213 lb)     Vitals reviewed by me  Nursing note reviewed  Chaperone present for all sensitive exam   Const: No acute distress  Alert  Nontoxic  Not diaphoretic  HEENT: External ears normal  No protrusion drainage swelling  Nose normal  No drainage/traumatic deformity  MMM  Mouth with baseline/symmetric movement  No trismus  Eyes: No squinting  No icterus  No tearing/swelling/drainage  Tracks through the room with normal EOM  Neck: ROM normal  No rigidity  No meningismus  Cards: Rate as per vitals   Compared to monitor sinus unless documented  Regular  Well perfused  Pulm: able to verbalize without additional effort  Effort and excursion normal  No distress  No audible wheezing/ stridor  Normal resp rate without retraction or change in work of breathing  Abd: No distension beyond baseline  No fluctuant wave  Patient without peritoneal pain with shifting/bumping the bed  MSK: ROM normal baseline  No deformity  No contractures from baseline  Pain over dorsum of foot  Skin: No new rashes visible  Well perfused  No wounds visualized on exposed skin  Neuro: Nonfocal  Baseline  CN grossly intact  Moving all four with coordination  Psych: Normal behavior and affect  A:  - Nursing note reviewed      Ddx and MDM  Considered diagnoses  Fracture/  xr       Wet read w mutiple fractures  Hard shoe  Crutches  (she has in car)   F/u with podiatry     Decision rules used:                    Live decision making and test interpretation:       Wet reads:   XR foot 3+ views LEFT   ED Interpretation   First toe, fracture involving joint  Second, more proximal fracture, possible fourth toe fracture      Final Result      Acute minimally displaced fractures of 1st, 2nd, and 4th proximal phalanges as described  As per comments in electronic health record, findings are concordant with preliminary interpretation provided by the emergency room physician  Workstation performed: [id number]             Orders Placed This Encounter   Procedures   • XR foot 3+ views LEFT     Labs Reviewed - No data to display    *Each of these labs was reviewed  Particular standout labs will be noted in the ED Course    Final Diagnosis:  1  Multiple closed fractures of left foot, initial encounter        Tigertext phone or in person consultation performed as follows: Followup outpatient discussed with patient as well as any following practitioners:     P:  - hospital tx includes   Medications   acetaminophen (TYLENOL) tablet 975 mg (975 mg Oral Given 1/24/23 2251)   morphine (MSIR) IR tablet 15 mg (15 mg Oral Given 1/24/23 5790)         - disposition  Time reflects when diagnosis was documented in both MDM as applicable and the Disposition within this note     Time User Action Codes Description Comment    1/24/2023 11:43 PM Deisy Rodrigez Add [U82 [id number]] Multiple closed fractures of left foot, initial encounter       ED Disposition     ED Disposition   Discharge    Condition   Stable    Date/Time   Tue Jan 24, 2023 11:43 PM    Jose Luis Flores discharge to home/self care  Follow-up Information     Follow up With Specialties Details Why Abelino Mitchell Podiat[facility]   [facility]  [phone]            - patient will call their PCP to let them know they were in the emergency department  We discuss return precautions and patient is agreeable with plan and aformentioned disposition         - additional treatment intended, if consistent with primary provider:  - patient to follow with :      Discharge Medication List as of 1/24/2023 11:45 PM      START taking these medications    Details   morphine (MSIR) 15 mg tablet Take 1 tablet (15 mg total) by mouth every 8 (eight) hours for 2 days Max Daily Amount: 45 mg, Starting Tue 1/24/2023, Until Thu 1/26/2023, Normal      naproxen (Naprosyn) 500 mg tablet Take 1 tablet (500 mg total) by mouth 2 (two) times a day with meals, Starting Tue 1/24/2023, Normal         CONTINUE these medications which have NOT CHANGED    Details   AMLODIPINE BESYLATE PO Take by mouth Dose unknown daily, Historical Med      BIOTIN PO Take 30,000 mcg by mouth every morning, Historical Med      Cetirizine HCl (ZYRTEC ALLERGY PO) Take 20 mg by mouth every morning, Historical Med      clobetasol (TEMOVATE) 0 05 % ointment Apply topically 2 (two) times a day, Starting Mon 3/30/2020, Normal      Cyanocobalamin (B-12) 5000 MCG CAPS Take 5,000 mcg by mouth daily, Historical Med      levothyroxine 112 mcg tablet Take 1 tablet (112 mcg total) by mouth every morning, Starting Mon 3/30/2020, Normal      PARoxetine (PAXIL) 30 mg tablet TAKE ONE TABLET BY MOUTH EVERY DAY IN THE MORNING, Normal      sitaGLIPtin (JANUVIA) 100 mg tablet Take 1 tablet (100 mg total) by mouth daily, Starting Mon 3/30/2020, Normal           No discharge procedures on file  Prior to Admission Medications   Prescriptions Last Dose Informant Patient Reported? Taking?    AMLODIPINE BESYLATE PO   Yes No   Sig: Take by mouth Dose unknown daily   BIOTIN PO   Yes No   Sig: Take 30,000 mcg by mouth every morning   Cetirizine HCl (ZYRTEC ALLERGY PO)   Yes No   Sig: Take 20 mg by mouth every morning   Cyanocobalamin (B-12) 5000 MCG CAPS   Yes No   Sig: Take 5,000 mcg by mouth daily   PARoxetine (PAXIL) 30 mg tablet   No No   Sig: TAKE ONE TABLET BY MOUTH EVERY DAY IN THE MORNING   clobetasol (TEMOVATE) 0 05 % ointment   No No Sig: Apply topically 2 (two) times a day   levothyroxine 112 mcg tablet   No No   Sig: Take 1 tablet (112 mcg total) by mouth every morning   sitaGLIPtin (JANUVIA) 100 mg tablet   No No   Sig: Take 1 tablet (100 mg total) by mouth daily      Facility-Administered Medications: None       Portions of the record may have been created with voice recognition software  Occasional wrong word or "sound a like" substitutions may have occurred due to the inherent limitations of voice recognition software  Read the chart carefully and recognize, using context, where substitutions have occurred      Electronically signed by:  MD Leanna Munoz MD  01/29/23 9366

## 2023-08-31 ENCOUNTER — TELEPHONE (OUTPATIENT)
Age: 48
End: 2023-08-31

## 2023-09-13 ENCOUNTER — OFFICE VISIT (OUTPATIENT)
Dept: PLASTIC SURGERY | Facility: CLINIC | Age: 48
End: 2023-09-13

## 2023-09-13 DIAGNOSIS — R22.0 SUBCUTANEOUS MASS OF HEAD: Primary | ICD-10-CM

## 2023-09-14 NOTE — PROGRESS NOTES
[facility] Plastic and Reconstructive Surgery  [address] [facility] NURY Villafuerte [place]  [phone]    Patient Identification: Charlie Turpin is a 53 y.o. female     History of Present Illness: The patient is a 53y.o.  year-old female  who presents to the office for new patient consult regarding mass on forehead. Patient states the mass has been present since 2018. She states she was hospitalized for a intractable migraine in April 2018. The migraine location was predominantly in her forehead at that time. Following this hospitalization, she states this mass slowly grew overtime. She denies any pain, itching, bleeding, fluid or discharge from the area. Pt states it has been slowly increasing in size and projection over the years. She denies any similar masses upon her body. Patient has no other complaints at this time. Past Medical History:   Diagnosis Date   • Abnormal ECG 12/2015    Interventricular conduction relay disorder   • Abnormal Pap smear of cervix Jan. 2015    See medical records from Dr. Roman Moreau   • Anxiety    • Arthritis     hands   • Disease of thyroid gland     hypo   • HPV (human papilloma virus) infection Jan. 2015    See medical records from Dr. Roman Moreau   • HTN (hypertension)    • Irregular heart beat     interventricular.  conduction relay disorder   • Nausea 12/2015    since gastric sleeve   • Ovarian cyst    • Urinary tract infection     chronic issue   • Varicella March 1985        Patient Active Problem List   Diagnosis   • Intractable migraine   • Anxiety   • Essential hypertension   • Status post laparoscopic sleeve gastrectomy   • Hypothyroidism   • Hyperlipemia, mixed   • Hypokalemia   • Incomplete RBBB   • Psoriasis   • Sleep apnea   • Vitamin D deficiency   • Impaired fasting glucose   • BMI 38.0-38.9,adult   • Class 2 severe obesity with serious comorbidity and body mass index (BMI) of 38.0 to 38.9 in adult [facility])   • Type 2 diabetes mellitus without complication, without long-term current use of insulin [facility])        Past Surgical History:   Procedure Laterality Date   • BARIATRIC SURGERY  Dec 2015    Gastric Sleeve   • CERVICAL BIOPSY N/A 3/2/2017    Procedure: BIOPSY LEEP CERVIX;  Surgeon: Amira Gregory MD;  Location: [facility];  Service:    • CERVICAL BIOPSY  W/ LOOP ELECTRODE EXCISION  Mar 2017    See medical records from Dr. Florida Perry   • ESOPHAGOGASTRODUODENOSCOPY     • GASTRIC RESTRICTION SURGERY  12/2015    sleeve at age 45   • TONSILLECTOMY      adenoids-age 35   • WISDOM TOOTH EXTRACTION      x4-age 18        Allergies   Allergen Reactions   • [place] (Diagnostic) - Food Allergy Anaphylaxis     All stone nuts. Lips/tongue swell-Birch Tree allergy   • Bee Venom Anaphylaxis     All over swelling/pain/redness   • Soy Lecithin [Lecithin] Anaphylaxis     Togue/throat swelling-dyspnea  Pt is only allergic to SOY        Current Outpatient Medications on File Prior to Visit   Medication Sig Dispense Refill   • AMLODIPINE BESYLATE PO Take by mouth Dose unknown daily     • BIOTIN PO Take 30,000 mcg by mouth every morning     • Cetirizine HCl (ZYRTEC ALLERGY PO) Take 20 mg by mouth every morning     • clobetasol (TEMOVATE) 0.05 % ointment Apply topically 2 (two) times a day 30 g 1   • Cyanocobalamin (B-12) 5000 MCG CAPS Take 5,000 mcg by mouth daily     • levothyroxine 112 mcg tablet Take 1 tablet (112 mcg total) by mouth every morning 30 tablet 5   • naproxen (Naprosyn) 500 mg tablet Take 1 tablet (500 mg total) by mouth 2 (two) times a day with meals 30 tablet 0   • PARoxetine (PAXIL) 30 mg tablet TAKE ONE TABLET BY MOUTH EVERY DAY IN THE MORNING 30 tablet 3   • sitaGLIPtin (JANUVIA) 100 mg tablet Take 1 tablet (100 mg total) by mouth daily 30 tablet 5     No current facility-administered medications on file prior to visit.         Tobacco Use: Medium Risk (9/13/2023)    Patient History    • Smoking Tobacco Use: Former    • Smokeless Tobacco Use: Never    • Passive Exposure: Not on file Physical Exam   Constitutional:AAOx3, well-developed, no distress. Pt is cooperative and pleasant. Eyes: Pupils are equal, round, and reactive to light. EOM in tact. Clear conjunctiva  Cardiovascular: Normal rate, regular rhythm  Pulmonary/Chest: Effort normal and breath sounds normal. No respiratory distress. Neuro: Gait in tact. Reflexes and motor strength normal and symmetric. Cranial nerved 2-12 grossly intact  Psychiatric: Has appropriate behavior and thought process. Extremities: Full ROM, no gross abnormalities. HENT: Subcutaneous, possibly estelle, firm, nonmobile mass upon the right forehead. Measures 2.5x2.0cm. no overlying skin changes. No punctum appreciated. No fluid or fluctuance appreciated. Assessment and Plan:  he patient is a 53y.o.  year-old female  who presents to the office for new patient consult regarding mass on forehead.     -At today's visit discussed future plan of care. Ordered US soft tissue head to evaluate mass and its composition. Pt is to complete US and will review results at next visit. At this time we can assess future plan of care  -The patient is to return in 3 weeks to review US results. She is to call with any concerns or questions. - The patient is to call the office with any questions or concerns. All of the patient's questions were answered at this time and they agree with the plan of care.       Samuel Pena PA-C  [facility] Plastic and Reconstructive Surgery

## 2023-09-25 ENCOUNTER — HOSPITAL ENCOUNTER (OUTPATIENT)
Dept: RADIOLOGY | Facility: HOSPITAL | Age: 48
Discharge: HOME | End: 2023-09-25
Payer: COMMERCIAL

## 2023-09-25 DIAGNOSIS — R22.0 SUBCUTANEOUS MASS OF HEAD: ICD-10-CM

## 2023-09-25 PROCEDURE — 76536 US EXAM OF HEAD AND NECK: CPT

## 2023-10-03 ENCOUNTER — OFFICE VISIT (OUTPATIENT)
Dept: PLASTIC SURGERY | Facility: CLINIC | Age: 48
End: 2023-10-03

## 2023-10-03 DIAGNOSIS — R22.0 MASS OF HEAD: Primary | ICD-10-CM

## 2023-10-03 RX ORDER — ALPRAZOLAM 0.5 MG/1
0.5 TABLET ORAL ONCE
Qty: 1 TABLET | Refills: 0 | Status: SHIPPED | OUTPATIENT
Start: 2023-10-03 | End: 2023-10-03

## 2023-10-03 NOTE — PROGRESS NOTES
[facility] SPECIALTY [facility] Plastic and Reconstructive Surgery  [address] [place] [address] [facility] [place]  [phone]    Patient Identification: Caterina Davalos is a 53 y.o. female     History of Present Illness: The patient is a 53y.o.  year-old female  who presents to the office for new patient consult regarding mass on forehead. Patient completed soft tissue ultrasound of forehead and would like to discuss results. Pt states the mass is consistent with last visit, no changes. She has no other concerns at today's visit. Past Medical History:   Diagnosis Date   • Abnormal ECG 12/2015    Interventricular conduction relay disorder   • Abnormal Pap smear of cervix Jan. 2015    See medical records from Dr. Lor Mata   • Anxiety    • Arthritis     hands   • Disease of thyroid gland     hypo   • HPV (human papilloma virus) infection Jan. 2015    See medical records from Dr. Lor Mata   • HTN (hypertension)    • Irregular heart beat     interventricular.  conduction relay disorder   • Nausea 12/2015    since gastric sleeve   • Ovarian cyst    • Urinary tract infection     chronic issue   • Varicella March 1985        Patient Active Problem List   Diagnosis   • Intractable migraine   • Anxiety   • Essential hypertension   • Status post laparoscopic sleeve gastrectomy   • Hypothyroidism   • Hyperlipemia, mixed   • Hypokalemia   • Incomplete RBBB   • Psoriasis   • Sleep apnea   • Vitamin D deficiency   • Impaired fasting glucose   • BMI 38.0-38.9,adult   • Class 2 severe obesity with serious comorbidity and body mass index (BMI) of 38.0 to 38.9 in adult    • Type 2 diabetes mellitus without complication, without long-term current use of insulin [facility])        Past Surgical History:   Procedure Laterality Date   • BARIATRIC SURGERY  Dec 2015    Gastric Sleeve   • CERVICAL BIOPSY N/A 3/2/2017    Procedure: BIOPSY LEEP CERVIX;  Surgeon: Aniyah Michael MD;  [facility];  Service:    • CERVICAL BIOPSY  W/ LOOP ELECTRODE EXCISION Mar 2017    See medical records from Dr. Roman Moreau   • ESOPHAGOGASTRODUODENOSCOPY     • GASTRIC RESTRICTION SURGERY  12/2015    sleeve at age 45   • TONSILLECTOMY      adenoids-age 35   • WISDOM TOOTH EXTRACTION      x4-age 18        Allergies   Allergen Reactions   • [place] (Diagn[facility]) - Food Allergy Anaphylaxis     All stone nuts. Lips/tongue swell-Birch Tree allergy   • Bee Venom Anaphylaxis     All over swelling/pain/redness   • Soy Lecithin [Lecithin] Anaphylaxis     Togue/throat swelling-dyspnea  Pt is only allergic to SOY        Current Outpatient Medications on File Prior to Visit   Medication Sig Dispense Refill   • AMLODIPINE BESYLATE PO Take by mouth Dose unknown daily     • BIOTIN PO Take 30,000 mcg by mouth every morning     • Cetirizine HCl (ZYRTEC ALLERGY PO) Take 20 mg by mouth every morning     • clobetasol (TEMOVATE) 0.05 % ointment Apply topically 2 (two) times a day 30 g 1   • Cyanocobalamin (B-12) 5000 MCG CAPS Take 5,000 mcg by mouth daily     • levothyroxine 112 mcg tablet Take 1 tablet (112 mcg total) by mouth every morning 30 tablet 5   • naproxen (Naprosyn) 500 mg tablet Take 1 tablet (500 mg total) by mouth 2 (two) times a day with meals 30 tablet 0   • PARoxetine (PAXIL) 30 mg tablet TAKE ONE TABLET BY MOUTH EVERY DAY IN THE MORNING 30 tablet 3   • sitaGLIPtin (JANUVIA) 100 mg tablet Take 1 tablet (100 mg total) by mouth daily 30 tablet 5     No current facility-administered medications on file prior to visit. Tobacco Use: Medium Risk (10/3/2023)    Patient History    • Smoking Tobacco Use: Former    • Smokeless Tobacco Use: Never    • Passive Exposure: Not on file        Physical Exam  HENT: Subcutaneous, possibly estelle, firm, nonmobile mass upon the right forehead. Measures 2.5x2.0cm. no overlying skin changes. No punctum appreciated. No fluid or fluctuance appreciated.     Assessment and Plan:  he patient is a 53y.o.  year-old female  who presents to the office for new patient consult regarding mass on forehead. -Reviewed ultrasound results with the patient:  FINDINGS:  No solid mass or focal fluid collection identified in the right forehead soft tissues, the area of palpable concern as shown by the patient.     IMPRESSION:  No solid mass or focal fluid collection identified in the right forehead soft tissues, the area of palpable concern as shown by the patient. -Reviewed with the patient there is no soft tissue masses identifiable via ultrasound, therefore there is no indication for a procedure at this time. Discussed possibility of bony mass present, may be evaluated via CT scan. Pt would like to move forward with CT scan for evaluation.  -The patient is to return in 3 weeks to review CT  results. She is to call with any concerns or questions. - The patient is to call the office with any questions or concerns. All of the patient's questions were answered at this time and they agree with the plan of care.       Arthur Hernandes PA-C  [facility] Plastic and Reconstructive Surgery

## 2023-10-05 DIAGNOSIS — Z01.818 PREOPERATIVE TESTING: Primary | ICD-10-CM

## 2023-10-05 NOTE — PROGRESS NOTES
Patient returned call and informed me that she is getting a CMP at lab [facility] tomorrow and would email me results. Provided my email to patient.

## 2023-10-05 NOTE — PROGRESS NOTES
LVM for patient to make her aware of CMP placed by Brady Medeiros PA-C to be completed prior to CT scheduled. Requested patient call back to confirm she received it.

## 2023-10-11 ENCOUNTER — HOSPITAL ENCOUNTER (OUTPATIENT)
Dept: RADIOLOGY | Facility: HOSPITAL | Age: 48
Discharge: HOME | End: 2023-10-11
Payer: COMMERCIAL

## 2023-10-11 DIAGNOSIS — R22.0 MASS OF HEAD: ICD-10-CM

## 2023-10-11 PROCEDURE — 70470 CT HEAD/BRAIN W/O & W/DYE: CPT

## 2023-10-11 PROCEDURE — G1004 CDSM NDSC: HCPCS

## 2023-10-11 RX ADMIN — IOHEXOL 100 ML: 350 INJECTION, SOLUTION INTRAVENOUS at 09:23

## 2023-10-17 ENCOUNTER — CONSULT (OUTPATIENT)
Dept: PLASTIC SURGERY | Facility: CLINIC | Age: 48
End: 2023-10-17

## 2023-10-17 DIAGNOSIS — D17.0 LIPOMA OF HEAD: Primary | ICD-10-CM

## 2023-10-18 ENCOUNTER — PREP FOR PROCEDURE (OUTPATIENT)
Dept: PLASTIC SURGERY | Facility: CLINIC | Age: 48
End: 2023-10-18

## 2023-10-18 ENCOUNTER — TELEPHONE (OUTPATIENT)
Dept: PLASTIC SURGERY | Facility: CLINIC | Age: 48
End: 2023-10-18

## 2023-10-18 DIAGNOSIS — D17.0 LIPOMA OF FOREHEAD: Primary | ICD-10-CM

## 2023-10-19 NOTE — PROGRESS NOTES
[facility] SPECIALTY [facility] Plastic and Reconstructive Surgery  [address] [place] [address] NURY Villafuerte [place]  [phone]    Patient Identification: Berhane Cramer is a 53 y.o. female     History of Present Illness: The patient is a 53y.o.  year-old female  who presents to the office for new patient consult regarding mass on forehead. Pt had a soft tissue ultrasound completed on 9/13/23 which was inconclusive. Ordered CT of head to further evaluate the mass. Patient presents today with her  to review results. She has no complaints at this time. Pt is eager to have mass removed. Past Medical History:   Diagnosis Date    Abnormal ECG 12/2015    Interventricular conduction relay disorder    Abnormal Pap smear of cervix Jan. 2015    See medical records from Dr. Radha Lincoln     hands    Disease of thyroid gland     hypo    HPV (human papilloma virus) infection Jan. 2015    See medical records from Dr. [place] Meadows    HTN (hypertension)     Irregular heart beat     interventricular.  conduction relay disorder    Nausea 12/2015    since gastric sleeve    Ovarian cyst     Urinary tract infection     chronic issue    Varicella March 1985        Patient Active Problem List   Diagnosis    Intractable migraine    Anxiety    Essential hypertension    Status post laparoscopic sleeve gastrectomy    Hypothyroidism    Hyperlipemia, mixed    Hypokalemia    Incomplete RBBB    Psoriasis    Sleep apnea    Vitamin D deficiency    Impaired fasting glucose    BMI 38.0-38.9,adult    Class 2 severe obesity with serious comorbidity and body mass index (BMI) of 38.0 to 38.9 in adult     Type 2 diabetes mellitus without complication, without long-term current use of insulin [facility])        Past Surgical History:   Procedure Laterality Date    BARIATRIC SURGERY  Dec 2015    Gastric Sleeve    CERVICAL BIOPSY N/A 3/2/2017    Procedure: BIOPSY LEEP CERVIX;  Surgeon: Garfield Araujo MD;  Location: [facility];  Service:     CERVICAL BIOPSY W/ LOOP ELECTRODE EXCISION  Mar 2017    See medical records from Dr. Cathy Zamudio    ESOPHAGOGASTRODUODENOSCOPY      GASTRIC RESTRICTION SURGERY  12/2015    sleeve at age 45    TONSILLECTOMY      adenoids-age 35    WISDOM TOOTH EXTRACTION      x4-age 18        Allergies   Allergen Reactions    [place] (Diagnostic) - Food Allergy Anaphylaxis     All stone nuts. Lips/tongue swell-Birch Tree allergy    Bee Venom Anaphylaxis     All over swelling/pain/redness    Soy Lecithin [Lecithin] Anaphylaxis     Togue/throat swelling-dyspnea  Pt is only allergic to SOY        Current Outpatient Medications on File Prior to Visit   Medication Sig Dispense Refill    ALPRAZolam (Xanax) 0.5 mg tablet Take 1 tablet (0.5 mg total) by mouth 1 (one) time for 1 dose 1 tablet 0    AMLODIPINE BESYLATE PO Take by mouth Dose unknown daily      BIOTIN PO Take 30,000 mcg by mouth every morning      Cetirizine HCl (ZYRTEC ALLERGY PO) Take 20 mg by mouth every morning      clobetasol (TEMOVATE) 0.05 % ointment Apply topically 2 (two) times a day 30 g 1    Cyanocobalamin (B-12) 5000 MCG CAPS Take 5,000 mcg by mouth daily      levothyroxine 112 mcg tablet Take 1 tablet (112 mcg total) by mouth every morning 30 tablet 5    naproxen (Naprosyn) 500 mg tablet Take 1 tablet (500 mg total) by mouth 2 (two) times a day with meals 30 tablet 0    PARoxetine (PAXIL) 30 mg tablet TAKE ONE TABLET BY MOUTH EVERY DAY IN THE MORNING 30 tablet 3    sitaGLIPtin (JANUVIA) 100 mg tablet Take 1 tablet (100 mg total) by mouth daily 30 tablet 5     No current facility-administered medications on file prior to visit. Tobacco Use: Medium Risk (10/3/2023)    Patient History     Smoking Tobacco Use: Former     Smokeless Tobacco Use: Never     Passive Exposure: Not on file        Physical Exam  HENT: Subcutaneous, possibly estelle, firm, nonmobile mass upon the right forehead. Measures 2cm circmfrentially. no overlying skin changes. No punctum appreciated.  No fluid or fluctuance appreciated. Assessment and Plan:  The patient is a 53y.o.  year-old female  who presents to the office for new patient consult regarding mass on forehead. -Reviewed CT results with the patient  CT BRAIN - WITH AND WITHOUT CONTRAST     INDICATION:   R22.0: Localized swelling, mass and lump, head. COMPARISON: CT head 4/14/2018     FINDINGS:  PARENCHYMA:  No intracranial mass, mass effect or midline shift. No CT signs of acute infarction. No acute parenchymal hemorrhage. Post contrast imaging of the brain is normal.  VENTRICLES AND EXTRA-AXIAL SPACES:  Normal for patient's age. VISUALIZED ORBITS: Normal visualized orbits. PARANASAL SINUSES: Left maxillary sinus retention cyst.  The remainder of the visualized paranasal sinus cavities and mastoid air cells are clear  CALVARIUM: A small right frontal scalp lipoma is noted measuring 0.4 x 1.9 x 1.5 cm (AP x ML x CC), new since the prior 2018 CT. A small right frontal scalp lipoma is noted, new since the prior 2018 CT. There is no associated soft tissue density, adjacent osseous abnormality or surrounding infiltrative changes to suggest malignancy. -Reviewed with the patient the mass is suspected to be a lipoma. Educated her on lipomas and the benign diagnosis. Discussed options for removal and suggest excision with complex closure within an OR setting. Details of the procedure and after-care were reviewed with the patient. Mass will be sent for pathology review, resulting in 7-10 days. Area will be closed with sutures and replaced with a scar. Pt will be educated on scar care and use of silicone scar cream in post-operative period.   - Reviewed the risks of the procedure, such as bleeding, infection, asymmetry, contour deformity, scarring, wound healing problems, anesthesia risks, as well as possibility of subsequent procedures. All questions were answered at this time.  Patient would like to move forward with scheduling procedure.  -Patient has a history of diabetes mellitus, will order HbA1c to assess. Reviewed with her wound healing complications that can arise with uncontrolled blood sugar levels. -Our office will contact the patient to schedule. She may call with any questions. - The patient is to call the office with any questions or concerns. All of the patient's questions were answered at this time and they agree with the plan of care.       Felicitas Stubbs PA-C  [facility] Plastic and Reconstructive Surgery

## 2023-11-08 ENCOUNTER — TELEPHONE (OUTPATIENT)
Dept: PLASTIC SURGERY | Facility: CLINIC | Age: 48
End: 2023-11-08

## 2023-12-04 ENCOUNTER — TELEPHONE (OUTPATIENT)
Dept: PLASTIC SURGERY | Facility: CLINIC | Age: 48
End: 2023-12-04

## 2023-12-04 NOTE — TELEPHONE ENCOUNTER
Patient left me a message regarding jury duty form. I emailed her so that she could email me the form so that I may have it filled out for her and email it  back to her.

## 2023-12-20 ENCOUNTER — ANESTHESIA EVENT (OUTPATIENT)
Dept: PERIOP | Facility: AMBULARY SURGERY CENTER | Age: 48
End: 2023-12-20
Payer: COMMERCIAL

## 2023-12-21 RX ORDER — ATORVASTATIN CALCIUM 10 MG/1
10 TABLET, FILM COATED ORAL DAILY
COMMUNITY
Start: 2023-10-12

## 2023-12-21 RX ORDER — EMPAGLIFLOZIN 10 MG/1
10 TABLET, FILM COATED ORAL DAILY
COMMUNITY
Start: 2023-10-13

## 2023-12-21 RX ORDER — TIRZEPATIDE 5 MG/.5ML
5 INJECTION, SOLUTION SUBCUTANEOUS
COMMUNITY
Start: 2023-11-30

## 2023-12-21 RX ORDER — AMLODIPINE BESYLATE 10 MG/1
10 TABLET ORAL DAILY
COMMUNITY
Start: 2023-10-12

## 2023-12-21 RX ORDER — CALCIPOTRIENE 50 UG/G
OINTMENT TOPICAL
COMMUNITY
Start: 2023-11-14

## 2023-12-21 RX ORDER — GLIPIZIDE 10 MG/1
10 TABLET, FILM COATED, EXTENDED RELEASE ORAL
COMMUNITY
Start: 2023-10-12

## 2023-12-21 NOTE — PRE-PROCEDURE INSTRUCTIONS
Pre-Surgery Instructions:   Medication Instructions    amLODIPine (NORVASC) 10 mg tablet Take day of surgery.    Ascorbic Acid (MIGUEL-C PO) Stop taking 7 days prior to surgery.    atorvastatin (LIPITOR) 10 mg tablet Take day of surgery.    BIOTIN PO Stop taking 7 days prior to surgery.    calcipotriene (DOVONOX) 0.005 % ointment Hold day of surgery.    Cetirizine HCl (ZYRTEC ALLERGY PO) Hold day of surgery.    Cyanocobalamin (B-12) 5000 MCG CAPS Stop taking 7 days prior to surgery.    glipiZIDE (GLUCOTROL XL) 10 mg 24 hr tablet Hold day of surgery.    Jardiance 10 MG TABS tablet Stop taking 4 days prior to surgery.    levothyroxine 112 mcg tablet Take day of surgery.    Mounjaro 5 MG/0.5ML Stop taking 7 days prior to surgery.    PARoxetine (PAXIL) 30 mg tablet Take day of surgery.   Medication instructions for day surgery reviewed. Please use only a sip of water to take your instructed medications. Avoid all over the counter vitamins, supplements and NSAIDS for one week prior to surgery per anesthesia guidelines. Tylenol is ok to take as needed.     You will receive a call one business day prior to surgery with an arrival time and hospital directions. If your surgery is scheduled on a Monday, the hospital will be calling you on the Friday prior to your surgery. If you have not heard from anyone by 8pm, please call the hospital supervisor through the hospital  at [phone]. ([place] [phone]).    Do not eat or drink anything after midnight the night before your surgery, including candy, mints, lifesavers, or chewing gum. Do not drink alcohol 24hrs before your surgery. Try not to smoke at least 24hrs before your surgery.       Follow the pre surgery showering instructions as listed in the “My Surgical Experience Booklet” or otherwise provided by your surgeon's office. Do not use a blade to shave the surgical area 1 week before surgery. It is okay to use a clean electric clippers up to 24 hours before  surgery. Do not apply any lotions, creams, including makeup, cologne, deodorant, or perfumes after showering on the day of your surgery. Do not use dry shampoo, hair spray, hair gel, or any type of hair products.     No contact lenses, eye make-up, or artificial eyelashes. Remove nail polish, including gel polish, and any artificial, gel, or acrylic nails if possible. Remove all jewelry including rings and body piercing jewelry.     Wear causal clothing that is easy to take on and off. Consider your type of surgery.    Keep any valuables, jewelry, piercings at home. Please bring any specially ordered equipment (sling, braces) if indicated.    Arrange for a responsible person to drive you to and from the hospital on the day of your surgery. Visitor Guidelines discussed.     Call the surgeon's office with any new illnesses, exposures, or additional questions prior to surgery.    Please reference your “My Surgical Experience Booklet” for additional information to prepare for your upcoming surgery.

## 2023-12-27 LAB
EXTERNAL ALBUMIN: 4.6
EXTERNAL BUN: 17
EXTERNAL CREATININE: 0.76
EXTERNAL EGFR: 97

## 2023-12-28 LAB
BASOPHILS # BLD AUTO: 0.1 X10E3/UL (ref 0–0.2)
BASOPHILS NFR BLD AUTO: 1 %
BUN SERPL-MCNC: 6 MG/DL (ref 6–24)
BUN/CREAT SERPL: 9 (ref 9–23)
CALCIUM SERPL-MCNC: 9.2 MG/DL (ref 8.7–10.2)
CHLORIDE SERPL-SCNC: 102 MMOL/L (ref 96–106)
CO2 SERPL-SCNC: 26 MMOL/L (ref 20–29)
CREAT SERPL-MCNC: 0.69 MG/DL (ref 0.57–1)
EGFR: 108 ML/MIN/1.73
EOSINOPHIL # BLD AUTO: 0.2 X10E3/UL (ref 0–0.4)
EOSINOPHIL NFR BLD AUTO: 4 %
ERYTHROCYTE [DISTWIDTH] IN BLOOD BY AUTOMATED COUNT: 12.9 % (ref 11.7–15.4)
EST. AVERAGE GLUCOSE BLD GHB EST-MCNC: 163 MG/DL
GLUCOSE SERPL-MCNC: 105 MG/DL (ref 70–99)
HBA1C MFR BLD: 7.3 % (ref 4.8–5.6)
HCT VFR BLD AUTO: 44.6 % (ref 34–46.6)
HGB BLD-MCNC: 14.9 G/DL (ref 11.1–15.9)
IMM GRANULOCYTES # BLD: 0 X10E3/UL (ref 0–0.1)
IMM GRANULOCYTES NFR BLD: 0 %
LYMPHOCYTES # BLD AUTO: 2.3 X10E3/UL (ref 0.7–3.1)
LYMPHOCYTES NFR BLD AUTO: 35 %
MCH RBC QN AUTO: 31.1 PG (ref 26.6–33)
MCHC RBC AUTO-ENTMCNC: 33.4 G/DL (ref 31.5–35.7)
MCV RBC AUTO: 93 FL (ref 79–97)
MONOCYTES # BLD AUTO: 0.5 X10E3/UL (ref 0.1–0.9)
MONOCYTES NFR BLD AUTO: 8 %
NEUTROPHILS # BLD AUTO: 3.4 X10E3/UL (ref 1.4–7)
NEUTROPHILS NFR BLD AUTO: 52 %
PLATELET # BLD AUTO: 216 X10E3/UL (ref 150–450)
POTASSIUM SERPL-SCNC: 3.5 MMOL/L (ref 3.5–5.2)
RBC # BLD AUTO: 4.79 X10E6/UL (ref 3.77–5.28)
SODIUM SERPL-SCNC: 143 MMOL/L (ref 134–144)
WBC # BLD AUTO: 6.6 X10E3/UL (ref 3.4–10.8)

## 2024-01-03 ENCOUNTER — HOSPITAL ENCOUNTER (OUTPATIENT)
Facility: AMBULARY SURGERY CENTER | Age: 49
Setting detail: OUTPATIENT SURGERY
Discharge: HOME | End: 2024-01-03
Attending: PLASTIC SURGERY | Admitting: PLASTIC SURGERY
Payer: COMMERCIAL

## 2024-01-03 ENCOUNTER — ANESTHESIA (OUTPATIENT)
Dept: PERIOP | Facility: AMBULARY SURGERY CENTER | Age: 49
End: 2024-01-03
Payer: COMMERCIAL

## 2024-01-03 VITALS
HEIGHT: 64 IN | SYSTOLIC BLOOD PRESSURE: 111 MMHG | RESPIRATION RATE: 18 BRPM | WEIGHT: 208.4 LBS | HEART RATE: 70 BPM | OXYGEN SATURATION: 95 % | TEMPERATURE: 98 F | DIASTOLIC BLOOD PRESSURE: 77 MMHG | BODY MASS INDEX: 35.58 KG/M2

## 2024-01-03 DIAGNOSIS — D17.0 LIPOMA OF FOREHEAD: ICD-10-CM

## 2024-01-03 LAB — GLUCOSE SERPL-MCNC: 140 MG/DL (ref 65–140)

## 2024-01-03 PROCEDURE — 21014 EXC FACE TUM DEEP 2 CM/>: CPT | Performed by: PHYSICIAN ASSISTANT

## 2024-01-03 PROCEDURE — 13132 CMPLX RPR F/C/C/M/N/AX/G/H/F: CPT | Performed by: PLASTIC SURGERY

## 2024-01-03 PROCEDURE — 88304 TISSUE EXAM BY PATHOLOGIST: CPT | Performed by: PATHOLOGY

## 2024-01-03 PROCEDURE — 13132 CMPLX RPR F/C/C/M/N/AX/G/H/F: CPT | Performed by: PHYSICIAN ASSISTANT

## 2024-01-03 PROCEDURE — 99024 POSTOP FOLLOW-UP VISIT: CPT | Performed by: PLASTIC SURGERY

## 2024-01-03 PROCEDURE — 21014 EXC FACE TUM DEEP 2 CM/>: CPT | Performed by: PLASTIC SURGERY

## 2024-01-03 PROCEDURE — 82948 REAGENT STRIP/BLOOD GLUCOSE: CPT

## 2024-01-03 RX ORDER — MAGNESIUM HYDROXIDE 1200 MG/15ML
LIQUID ORAL AS NEEDED
Status: DISCONTINUED | OUTPATIENT
Start: 2024-01-03 | End: 2024-01-03 | Stop reason: HOSPADM

## 2024-01-03 RX ORDER — GINSENG 100 MG
CAPSULE ORAL AS NEEDED
Status: DISCONTINUED | OUTPATIENT
Start: 2024-01-03 | End: 2024-01-03 | Stop reason: HOSPADM

## 2024-01-03 RX ORDER — EPHEDRINE SULFATE 50 MG/ML
INJECTION INTRAVENOUS AS NEEDED
Status: DISCONTINUED | OUTPATIENT
Start: 2024-01-03 | End: 2024-01-03

## 2024-01-03 RX ORDER — ONDANSETRON 2 MG/ML
4 INJECTION INTRAMUSCULAR; INTRAVENOUS ONCE AS NEEDED
Status: DISCONTINUED | OUTPATIENT
Start: 2024-01-03 | End: 2024-01-03 | Stop reason: HOSPADM

## 2024-01-03 RX ORDER — LIDOCAINE HYDROCHLORIDE AND EPINEPHRINE 10; 10 MG/ML; UG/ML
INJECTION, SOLUTION INFILTRATION; PERINEURAL AS NEEDED
Status: DISCONTINUED | OUTPATIENT
Start: 2024-01-03 | End: 2024-01-03 | Stop reason: HOSPADM

## 2024-01-03 RX ORDER — LIDOCAINE HYDROCHLORIDE 10 MG/ML
0.5 INJECTION, SOLUTION EPIDURAL; INFILTRATION; INTRACAUDAL; PERINEURAL ONCE AS NEEDED
Status: DISCONTINUED | OUTPATIENT
Start: 2024-01-03 | End: 2024-01-03 | Stop reason: HOSPADM

## 2024-01-03 RX ORDER — FENTANYL CITRATE/PF 50 MCG/ML
50 SYRINGE (ML) INJECTION
Status: DISCONTINUED | OUTPATIENT
Start: 2024-01-03 | End: 2024-01-03 | Stop reason: HOSPADM

## 2024-01-03 RX ORDER — PROPOFOL 10 MG/ML
INJECTION, EMULSION INTRAVENOUS AS NEEDED
Status: DISCONTINUED | OUTPATIENT
Start: 2024-01-03 | End: 2024-01-03

## 2024-01-03 RX ORDER — ACETAMINOPHEN 325 MG/1
975 TABLET ORAL ONCE
Status: COMPLETED | OUTPATIENT
Start: 2024-01-03 | End: 2024-01-03

## 2024-01-03 RX ORDER — GABAPENTIN 100 MG/1
100 CAPSULE ORAL ONCE
Status: COMPLETED | OUTPATIENT
Start: 2024-01-03 | End: 2024-01-03

## 2024-01-03 RX ORDER — SODIUM CHLORIDE, SODIUM LACTATE, POTASSIUM CHLORIDE, CALCIUM CHLORIDE 600; 310; 30; 20 MG/100ML; MG/100ML; MG/100ML; MG/100ML
50 INJECTION, SOLUTION INTRAVENOUS CONTINUOUS
Status: CANCELLED | OUTPATIENT
Start: 2024-01-03

## 2024-01-03 RX ORDER — MIDAZOLAM HYDROCHLORIDE 2 MG/2ML
INJECTION, SOLUTION INTRAMUSCULAR; INTRAVENOUS AS NEEDED
Status: DISCONTINUED | OUTPATIENT
Start: 2024-01-03 | End: 2024-01-03

## 2024-01-03 RX ORDER — HYDROMORPHONE HCL/PF 1 MG/ML
0.2 SYRINGE (ML) INJECTION
Status: DISCONTINUED | OUTPATIENT
Start: 2024-01-03 | End: 2024-01-03 | Stop reason: HOSPADM

## 2024-01-03 RX ORDER — CEFAZOLIN SODIUM 2 G/50ML
2000 SOLUTION INTRAVENOUS ONCE
Status: COMPLETED | OUTPATIENT
Start: 2024-01-03 | End: 2024-01-03

## 2024-01-03 RX ORDER — SODIUM CHLORIDE, SODIUM LACTATE, POTASSIUM CHLORIDE, CALCIUM CHLORIDE 600; 310; 30; 20 MG/100ML; MG/100ML; MG/100ML; MG/100ML
INJECTION, SOLUTION INTRAVENOUS CONTINUOUS PRN
Status: DISCONTINUED | OUTPATIENT
Start: 2024-01-03 | End: 2024-01-03

## 2024-01-03 RX ORDER — ONDANSETRON 2 MG/ML
INJECTION INTRAMUSCULAR; INTRAVENOUS AS NEEDED
Status: DISCONTINUED | OUTPATIENT
Start: 2024-01-03 | End: 2024-01-03

## 2024-01-03 RX ORDER — FENTANYL CITRATE 50 UG/ML
INJECTION, SOLUTION INTRAMUSCULAR; INTRAVENOUS AS NEEDED
Status: DISCONTINUED | OUTPATIENT
Start: 2024-01-03 | End: 2024-01-03

## 2024-01-03 RX ADMIN — ACETAMINOPHEN 975 MG: 325 TABLET, FILM COATED ORAL at 11:08

## 2024-01-03 RX ADMIN — EPHEDRINE SULFATE 10 MG: 50 INJECTION INTRAVENOUS at 12:22

## 2024-01-03 RX ADMIN — MIDAZOLAM 2 MG: 1 INJECTION INTRAMUSCULAR; INTRAVENOUS at 12:03

## 2024-01-03 RX ADMIN — CEFAZOLIN SODIUM 2000 MG: 2 SOLUTION INTRAVENOUS at 12:12

## 2024-01-03 RX ADMIN — PROPOFOL 200 MG: 10 INJECTION, EMULSION INTRAVENOUS at 12:06

## 2024-01-03 RX ADMIN — EPHEDRINE SULFATE 5 MG: 50 INJECTION INTRAVENOUS at 12:19

## 2024-01-03 RX ADMIN — SODIUM CHLORIDE, SODIUM LACTATE, POTASSIUM CHLORIDE, AND CALCIUM CHLORIDE: .6; .31; .03; .02 INJECTION, SOLUTION INTRAVENOUS at 12:03

## 2024-01-03 RX ADMIN — FENTANYL CITRATE 100 MCG: 50 INJECTION INTRAMUSCULAR; INTRAVENOUS at 12:06

## 2024-01-03 RX ADMIN — GABAPENTIN 100 MG: 100 CAPSULE ORAL at 11:08

## 2024-01-03 RX ADMIN — ONDANSETRON 4 MG: 2 INJECTION INTRAMUSCULAR; INTRAVENOUS at 12:31

## 2024-01-03 NOTE — ANESTHESIA PREPROCEDURE EVALUATION
Procedure:  EXCISION OF FOREHEAD LIPOMA WITH COMPLEX CLOSURE (Head)    Relevant Problems   CARDIO   (+) Essential hypertension   (+) Hyperlipemia, mixed   (+) Incomplete RBBB   (+) Intractable migraine      ENDO   (+) Hypothyroidism   (+) Type 2 diabetes mellitus without complication, without long-term current use of insulin (HCC)      NEURO/PSYCH   (+) Anxiety   (+) Intractable migraine      PULMONARY   (+) Sleep apnea        Physical Exam    Airway       Dental       Cardiovascular  Cardiovascular exam normal    Pulmonary  Pulmonary exam normal     Other Findings  post-pubertal.      Anesthesia Plan  ASA Score- 2     Anesthesia Type- general with ASA Monitors.         Additional Monitors:     Airway Plan: LMA.           Plan Factors-Exercise tolerance (METS): >4 METS.    Chart reviewed. EKG reviewed.   Patient summary reviewed.    Patient is not a current smoker. Patient not instructed to abstain from smoking on day of procedure. Patient did not smoke on day of surgery.            Induction- intravenous.    Postoperative Plan- Plan for postoperative opioid use.     Informed Consent- Anesthetic plan and risks discussed with patient.  I personally reviewed this patient with the CRNA. Discussed and agreed on the Anesthesia Plan with the CRNA..

## 2024-01-03 NOTE — H&P
[facility]  [address], [address], [place] [address]  [phone]     Patient Identification: Dannie Mcgarry is a 48 y.o. female      History of Present Illness: The patient is a 48 y.o.  year-old female  who presents for excision of mass on forehead. Pt had a soft tissue ultrasound completed on 9/13/23 which was inconclusive. Ordered CT of head to further evaluate the mass. Patient presents today with her  to review results. She has no complaints at this time. Pt is eager to have mass removed.     Medical History        Past Medical History:   Diagnosis Date    Abnormal ECG 12/2015     Interventricular conduction relay disorder    Abnormal Pap smear of cervix Jan. 2015     See medical records from Dr. Rosa    Anxiety      Arthritis       hands    Disease of thyroid gland       hypo    HPV (human papilloma virus) infection Jan. 2015     See medical records from Dr. Rosa    HTN (hypertension)      Irregular heart beat       interventricular. conduction relay disorder    Nausea 12/2015     since gastric sleeve    Ovarian cyst      Urinary tract infection       chronic issue    Varicella March 1985                Patient Active Problem List   Diagnosis    Intractable migraine    Anxiety    Essential hypertension    Status post laparoscopic sleeve gastrectomy    Hypothyroidism    Hyperlipemia, mixed    Hypokalemia    Incomplete RBBB    Psoriasis    Sleep apnea    Vitamin D deficiency    Impaired fasting glucose    BMI 38.0-38.9,adult    Class 2 severe obesity with serious comorbidity and body mass index (BMI) of 38.0 to 38.9 in adult     Type 2 diabetes mellitus without complication, without long-term current use of insulin (HCC)         Surgical History         Past Surgical History:   Procedure Laterality Date    BARIATRIC SURGERY   Dec 2015     Gastric Sleeve    CERVICAL BIOPSY N/A 3/2/2017     Procedure: BIOPSY LEEP CERVIX;  Surgeon: Lon Rosa MD;   Location: [facility];  Service:     CERVICAL BIOPSY  W/ LOOP ELECTRODE EXCISION   Mar 2017     See medical records from Dr. Rosa    ESOPHAGOGASTRODUODENOSCOPY        GASTRIC RESTRICTION SURGERY   12/2015     sleeve at age 40    TONSILLECTOMY         adenoids-age 30    WISDOM TOOTH EXTRACTION         x4-age 18                  Allergies   Allergen Reactions    [employer] (Diagnostic) - Food Allergy Anaphylaxis       All stone nuts. Lips/tongue swell-Birch Tree allergy    Bee Venom Anaphylaxis       All over swelling/pain/redness    Soy Lecithin [Lecithin] Anaphylaxis       [place]/throat swelling-dyspnea  Pt is only allergic to SOY                Current Outpatient Medications on File Prior to Visit   Medication Sig Dispense Refill    ALPRAZolam (Xanax) 0.5 mg tablet Take 1 tablet (0.5 mg total) by mouth 1 (one) time for 1 dose 1 tablet 0    AMLODIPINE BESYLATE PO Take by mouth Dose unknown daily        BIOTIN PO Take 30,000 mcg by mouth every morning        Cetirizine HCl (ZYRTEC ALLERGY PO) Take 20 mg by mouth every morning        clobetasol (TEMOVATE) 0.05 % ointment Apply topically 2 (two) times a day 30 g 1    Cyanocobalamin (B-12) 5000 MCG CAPS Take 5,000 mcg by mouth daily        levothyroxine 112 mcg tablet Take 1 tablet (112 mcg total) by mouth every morning 30 tablet 5    naproxen (Naprosyn) 500 mg tablet Take 1 tablet (500 mg total) by mouth 2 (two) times a day with meals 30 tablet 0    PARoxetine (PAXIL) 30 mg tablet TAKE ONE TABLET BY MOUTH EVERY DAY IN THE MORNING 30 tablet 3    sitaGLIPtin (JANUVIA) 100 mg tablet Take 1 tablet (100 mg total) by mouth daily 30 tablet 5      No current facility-administered medications on file prior to visit.              Tobacco Use: Medium Risk (10/3/2023)     Patient History      Smoking Tobacco Use: Former      Smokeless Tobacco Use: Never      Passive Exposure: Not on file         Physical Exam:  /84   Pulse 70   Temp 98.2 °F (36.8 °C) (Temporal)   Resp 16   Ht  "5' 4\" (1.626 m)   Wt 94.5 kg (208 lb 6.4 oz)   LMP 11/22/2020 (Approximate)   SpO2 95%   BMI 35.77 kg/m²     Gen: AAOx3, NAD  HENT: Subcutaneous, possibly estelle, firm, nonmobile mass upon the right forehead. Measures 2cm circmfrentially. no overlying skin changes. No punctum appreciated. No fluid or fluctuance appreciated.  CVS: RRR  Pulm: CTA B/L  Abd: soft, NT  Ext: NVI distally     Assessment and Plan:  The patient is a 48 y.o.  year-old female  who presents to the office for new patient consult regarding mass on forehead.      -Reviewed CT results with the patient  CT BRAIN - WITH AND WITHOUT CONTRAST     INDICATION:   R22.0: Localized swelling, mass and lump, head.     COMPARISON: CT head 4/14/2018     FINDINGS:  PARENCHYMA:  No intracranial mass, mass effect or midline shift. No CT signs of acute infarction. No acute parenchymal hemorrhage.  Post contrast imaging of the brain is normal.  VENTRICLES AND EXTRA-AXIAL SPACES:  Normal for patient's age.  VISUALIZED ORBITS: Normal visualized orbits.  PARANASAL SINUSES: Left maxillary sinus retention cyst.  The remainder of the visualized paranasal sinus cavities and mastoid air cells are clear  CALVARIUM: A small right frontal scalp lipoma is noted measuring 0.4 x 1.9 x 1.5 cm (AP x ML x CC), new since the prior 2018 CT.     A small right frontal scalp lipoma is noted, new since the prior 2018 CT. There is no associated soft tissue density, adjacent osseous abnormality or surrounding infiltrative changes to suggest malignancy.      -Reviewed with the patient the mass is suspected to be a lipoma. Educated her on lipomas and the benign diagnosis. Discussed options for removal and suggest excision with complex closure within an OR setting.  Details of the procedure and after-care were reviewed with the patient. Mass will be sent for pathology review, resulting in 7-10 days. Area will be closed with sutures and replaced with a scar. Pt will be educated on scar care " and use of silicone scar cream in post-operative period.   - Reviewed the risks of the procedure, such as bleeding, infection, asymmetry, contour deformity, scarring, wound healing problems, anesthesia risks, as well as possibility of subsequent procedures. All questions were answered at this time. Patient would like to move forward with scheduling procedure.  -Patient has a history of diabetes mellitus, will order HbA1c to assess. Reviewed with her wound healing complications that can arise with uncontrolled blood sugar levels.  - The patient is to call the office with any questions or concerns. All of the patient's questions were answered at this time and they agree with the plan of care.

## 2024-01-03 NOTE — ANESTHESIA POSTPROCEDURE EVALUATION
Post-Op Assessment Note    CV Status:  Stable  Pain Score: 0    Pain management: adequate       Mental Status:  Alert and awake   Hydration Status:  Euvolemic   PONV Controlled:  Controlled   Airway Patency:  Patent     Post Op Vitals Reviewed: Yes      Staff: Anesthesiologist               BP   144/81   Temp   98.1F   Pulse  79   Resp   15   SpO2   90F

## 2024-01-03 NOTE — DISCHARGE INSTR - AVS FIRST PAGE
Please call the office when you leave to schedule an appointment for 2 weeks.     Activity:    Do not lift greater than 15 lbs for 2 weeks          Walking is encouraged  Normal daily activities including climbing steps are okay    Return to Work:   Okay to return to work when you feel well if you desire.        Diet:   You may return to your normal healthy diet.    Wound Care:  Your wound is closed with suture that will be removed at your post op visit.  It is okay to shower in 48 hours. Let soap/water run over your incision. Do not soak incisions in bath water or swim for two weeks. You may use bacitracin on your incision daily.    Pain Medication:   May use Advil or Motrin as needed for pain.     Other:  It is normal to get bruising after surgery.  If you have questions after discharge please call the office.    If you have increased pain, fever >101.5, increased drainage, redness or a bad smell at your surgery site, please call us immediately or come directly to the Emergency Room

## 2024-01-04 ENCOUNTER — TELEPHONE (OUTPATIENT)
Dept: PLASTIC SURGERY | Facility: CLINIC | Age: 49
End: 2024-01-04

## 2024-01-04 NOTE — TELEPHONE ENCOUNTER
Called patient LM in regards to scheduling her post op appointment for 2 weeks. Asked patient to call me back at her earliest convenience to schedule.

## 2024-01-05 PROCEDURE — 88304 TISSUE EXAM BY PATHOLOGIST: CPT | Performed by: PATHOLOGY

## 2024-01-09 NOTE — TELEPHONE ENCOUNTER
Rec'd return call from patient. Scheduled POPV with Helena on 1/16/2024 @ 8:30 am in the [place] location.

## 2024-01-30 LAB — HBA1C MFR BLD HPLC: 5.9 %

## 2024-02-04 ENCOUNTER — HOSPITAL ENCOUNTER (EMERGENCY)
Facility: HOSPITAL | Age: 49
Discharge: HOME | End: 2024-02-04
Attending: EMERGENCY MEDICINE
Payer: COMMERCIAL

## 2024-02-04 VITALS
WEIGHT: 206.35 LBS | RESPIRATION RATE: 16 BRPM | OXYGEN SATURATION: 97 % | DIASTOLIC BLOOD PRESSURE: 92 MMHG | BODY MASS INDEX: 35.42 KG/M2 | HEART RATE: 64 BPM | SYSTOLIC BLOOD PRESSURE: 156 MMHG | TEMPERATURE: 97.1 F

## 2024-02-04 DIAGNOSIS — L03.011 PARONYCHIA OF RIGHT INDEX FINGER: Primary | ICD-10-CM

## 2024-02-04 PROCEDURE — 99284 EMERGENCY DEPT VISIT MOD MDM: CPT | Performed by: EMERGENCY MEDICINE

## 2024-02-04 PROCEDURE — 99282 EMERGENCY DEPT VISIT SF MDM: CPT

## 2024-02-04 RX ORDER — CEPHALEXIN 500 MG/1
500 CAPSULE ORAL ONCE
Status: COMPLETED | OUTPATIENT
Start: 2024-02-04 | End: 2024-02-04

## 2024-02-04 RX ORDER — CEPHALEXIN 500 MG/1
500 CAPSULE ORAL EVERY 6 HOURS SCHEDULED
Qty: 20 CAPSULE | Refills: 0 | Status: SHIPPED | OUTPATIENT
Start: 2024-02-04 | End: 2024-02-09

## 2024-02-04 RX ADMIN — CEPHALEXIN 500 MG: 500 CAPSULE ORAL at 13:48

## 2024-02-04 NOTE — ED PROVIDER NOTES
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 Now nail area infected     HPI  Patient is a 49-year-old female presenting for evaluation of pain and swelling at the edge of the nail of her right index finger.  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  Patient has been using topical antibiotic ointment, has been soaking it in warm water baths and states occasional drainage of clear fluid.  Patient denies fevers, chills, weakness or numbness of the finger, problems with bending the finger.  Prior to Admission Medications   Prescriptions Last Dose Informant Patient Reported? Taking?   Ascorbic Acid (MIGUEL-C PO)   Yes No   Sig: Take by mouth daily after breakfast   BIOTIN PO  Self Yes No   Sig: Take 30,000 mcg by mouth every morning   Cetirizine HCl (ZYRTEC ALLERGY PO)  Self Yes No   Sig: Take 20 mg by mouth every morning   Cyanocobalamin (B-12) 5000 MCG CAPS   Yes No   Sig: Take 5,000 mcg by mouth daily   Jardiance 10 MG TABS tablet   Yes No   Sig: Take 10 mg by mouth daily   Mounjaro 5 MG/0.5ML   Yes No   Sig: Inject 5 mg under the skin every 7 days   PARoxetine (PAXIL) 30 mg tablet   No No   Sig: TAKE ONE TABLET BY MOUTH EVERY DAY IN THE MORNING   amLODIPine (NORVASC) 10 mg tablet   Yes No   Sig: Take 10 mg by mouth daily   atorvastatin (LIPITOR) 10 mg tablet   Yes No   Sig: Take 10 mg by mouth daily   calcipotriene (DOVONOX) 0.005 % ointment   Yes No   Sig: APPLY ONCE A DAY TO AFFECTED PSORIASIS AREAS   glipiZIDE (GLUCOTROL XL) 10 mg 24 hr tablet   Yes No   Sig: Take 10 mg by mouth daily with breakfast   levothyroxine 112 mcg tablet   No No   Sig: Take 1 tablet (112 mcg total) by mouth every morning      Facility-Administered Medications: None       Past  Medical History:   Diagnosis Date    Abnormal ECG 2015    Interventricular conduction relay disorder    Abnormal Pap smear of cervix 2015    See medical records from Dr. Rosa    Anxiety     Arthritis     hands    Diabetes mellitus (HCC)     Disease of thyroid gland     hypo    HPV (human papilloma virus) infection 2015    See medical records from Dr. Rosa    HTN (hypertension)     Hyperlipidemia     Hypertension     Irregular heart beat     interventricular. conduction relay disorder    Nausea 2015    since gastric sleeve    Ovarian cyst     Urinary tract infection     chronic issue    Varicella 1985       Past Surgical History:   Procedure Laterality Date    BARIATRIC SURGERY  Dec 2015    Gastric Sleeve    CERVICAL BIOPSY N/A 3/2/2017    Procedure: BIOPSY LEEP CERVIX;  Surgeon: Lon Rosa MD;  Location: [facility];  Service:     CERVICAL BIOPSY  W/ LOOP ELECTRODE EXCISION  Mar 2017    See medical records from Dr. Rosa    ESOPHAGOGASTRODUODENOSCOPY      GASTRIC RESTRICTION SURGERY  2015    sleeve at age 40    NM EXC TUMOR SOFT TISS FACE&SCALP SUBFASCIAL 2 CM/> N/A 1/3/2024    Procedure: EXCISION OF FOREHEAD LIPOMA WITH COMPLEX CLOSURE;  Surgeon: Luz Diallo MD;  [facility] MAIN OR;  Service: Plastics    TONSILLECTOMY      adenoids-age 30    WISDOM TOOTH EXTRACTION      x4-age 18       Family History   Problem Relation Age of Onset    Diabetes Mother         diet controlled    Diabetes Father         insulin    Other Father         agent orange     I have reviewed and agree with the history as documented.    E-Cigarette/Vaping    E-Cigarette Use Never User      E-Cigarette/Vaping Substances     Social History     Tobacco Use    Smoking status: Former     Current packs/day: 0.00     Average packs/day: 0.3 packs/day for 10.0 years (2.5 ttl pk-yrs)     Types: Cigarettes     Start date: 1994     Quit date:      Years since quittin.0    Smokeless tobacco: Never   Vaping Use     Vaping status: Never Used   Substance Use Topics    Alcohol use: Yes     Alcohol/week: 6.0 standard drinks of alcohol     Types: 4 Standard drinks or equivalent per week     Comment: occasional    Drug use: Yes     Frequency: 7.0 times per week     Types: Marijuana     Comment: occasionally       Review of Systems   Skin:  Positive for wound. Negative for color change, pallor and rash.   Neurological:  Negative for weakness and numbness.   All other systems reviewed and are negative.      Physical Exam  Physical Exam  Vitals and nursing note reviewed.   Constitutional:       General: She is not in acute distress.     Appearance: Normal appearance. She is not ill-appearing, toxic-appearing or diaphoretic.   HENT:      Head: Normocephalic and atraumatic.      Right Ear: External ear normal.      Left Ear: External ear normal.   Eyes:      General:         Right eye: No discharge.         Left eye: No discharge.   Pulmonary:      Effort: No respiratory distress.   Abdominal:      General: There is no distension.   Musculoskeletal:         General: No deformity.      Cervical back: Normal range of motion.      Comments: Area of erythema along the edge of right index finger without significant swelling or fluctuance to suggest a drainable abscess.   Skin:     Findings: No lesion or rash.   Neurological:      Mental Status: She is alert and oriented to person, place, and time. Mental status is at baseline.   Psychiatric:         Mood and Affect: Mood and affect normal.         Vital Signs  ED Triage Vitals [02/04/24 1320]   Temperature Pulse Respirations Blood Pressure SpO2   (!) 97.1 °F (36.2 °C) 64 16 156/92 97 %      Temp Source Heart Rate Source Patient Position - Orthostatic VS BP Location FiO2 (%)   Tympanic Monitor Sitting Right arm --      Pain Score       No Pain           Vitals:    02/04/24 1320   BP: 156/92   Pulse: 64   Patient Position - Orthostatic VS: Sitting         Visual Acuity      ED  Medications  Medications   cephalexin (KEFLEX) capsule 500 mg (500 mg Oral Given 2/4/24 1348)       Diagnostic Studies  Results Reviewed       None                   No orders to display              Procedures  Procedures         ED Course                                             Medical Decision Making  I obtained history from the patient.  Patient with exam and history consistent with early paronychia.  Based on exam, patient does not have significant drainable abscess at this time.  Instructed to continue warm soaks, topical antibiotics.  Treated with dose of Keflex in the emergency department and provided with course of Keflex for home, discharged with return precautions.             Disposition  Final diagnoses:   Paronychia of right index finger     Time reflects when diagnosis was documented in both MDM as applicable and the Disposition within this note       Time User Action Codes Description Comment    2/4/2024  1:43 PM Mehran Cullen Add [L03.011] Paronychia of right index finger           ED Disposition       ED Disposition   Discharge    Condition   Stable    Date/Time   Sun Feb 4, 2024  1:42 PM    Comment   Dannie Mcgarry discharge to home/self care.                   Follow-up Information       Follow up With Specialties Details Why Contact Info Additional Information    [facility] Emergency Department Emergency Medicine  If symptoms worsen [address] [place] [id number]  [phone] [facility] Emergency Department, [address] [place] [address], [id number]            Patient's Medications   Discharge Prescriptions    CEPHALEXIN (KEFLEX) 500 MG CAPSULE    Take 1 capsule (500 mg total) by mouth every 6 (six) hours for 5 days       Start Date: 2/4/2024  End Date: 2/9/2024       Order Dose: 500 mg       Quantity: 20 capsule    Refills: 0       No discharge procedures on file.    PDMP Review         Value Time User    PDMP Reviewed  Yes  1/3/2024 12:56 PM Cassie Austin PA-C            ED Provider  Electronically Signed by             Mehran Cullen MD  02/04/24 3972

## 2024-05-05 ENCOUNTER — HOSPITAL ENCOUNTER (EMERGENCY)
Facility: HOSPITAL | Age: 49
Discharge: HOME | End: 2024-05-05
Attending: EMERGENCY MEDICINE
Payer: COMMERCIAL

## 2024-05-05 VITALS
RESPIRATION RATE: 18 BRPM | OXYGEN SATURATION: 99 % | SYSTOLIC BLOOD PRESSURE: 171 MMHG | HEART RATE: 68 BPM | DIASTOLIC BLOOD PRESSURE: 101 MMHG

## 2024-05-05 DIAGNOSIS — T63.441A BEE STING: ICD-10-CM

## 2024-05-05 DIAGNOSIS — L71.0 PERIORAL DERMATITIS: Primary | ICD-10-CM

## 2024-05-05 PROCEDURE — 99284 EMERGENCY DEPT VISIT MOD MDM: CPT | Performed by: PHYSICIAN ASSISTANT

## 2024-05-05 PROCEDURE — 99282 EMERGENCY DEPT VISIT SF MDM: CPT

## 2024-05-05 RX ORDER — PREDNISONE 20 MG/1
40 TABLET ORAL DAILY
Qty: 8 TABLET | Refills: 0 | Status: SHIPPED | OUTPATIENT
Start: 2024-05-05 | End: 2024-05-09

## 2024-05-05 RX ORDER — PREDNISONE 20 MG/1
60 TABLET ORAL ONCE
Status: COMPLETED | OUTPATIENT
Start: 2024-05-05 | End: 2024-05-05

## 2024-05-05 RX ADMIN — PREDNISONE 60 MG: 20 TABLET ORAL at 11:36

## 2024-05-05 NOTE — ED PROVIDER NOTES
History  Chief Complaint   Patient presents with    Rash     Patient states she was stung by a honey bee yesterday on the septum of her nose.  States she woke up this morning with a periorbital rash, a heavy feeling in her eyes, and states the inside of her cheeks feel swollen.     50 y/o female presenting today with a facial rash that began earlier in the week, very minimally itchy however somewhat sore in nature.  States that she wears masks and comes in contact with different chemicals at work.  States that yesterday she was stung by a bee below the nose and woke up this morning with a worsening rash.  She believes it might be dermatitis.  States that the inside of her mouth is sore however has not noticed any lesions.  States that the bee sting was quite painful.  Has not changed any new products.  No recent viral illness.  Denies difficulty breathing, chest tightness, fevers, nausea, vomiting, spreading redness, difficulty swallowing etc.  Differential includes but is not limited to perioral dermatitis, allergic reaction etc.        Prior to Admission Medications   Prescriptions Last Dose Informant Patient Reported? Taking?   Ascorbic Acid (MIGUEL-C PO)   Yes No   Sig: Take by mouth daily after breakfast   BIOTIN PO  Self Yes No   Sig: Take 30,000 mcg by mouth every morning   Cetirizine HCl (ZYRTEC ALLERGY PO)  Self Yes No   Sig: Take 20 mg by mouth every morning   Cyanocobalamin (B-12) 5000 MCG CAPS   Yes No   Sig: Take 5,000 mcg by mouth daily   Jardiance 10 MG TABS tablet   Yes No   Sig: Take 10 mg by mouth daily   Mounjaro 5 MG/0.5ML   Yes No   Sig: Inject 5 mg under the skin every 7 days   PARoxetine (PAXIL) 30 mg tablet   No No   Sig: TAKE ONE TABLET BY MOUTH EVERY DAY IN THE MORNING   amLODIPine (NORVASC) 10 mg tablet   Yes No   Sig: Take 10 mg by mouth daily   atorvastatin (LIPITOR) 10 mg tablet   Yes No   Sig: Take 10 mg by mouth daily   calcipotriene (DOVONOX) 0.005 % ointment   Yes No   Sig: APPLY ONCE  A DAY TO AFFECTED PSORIASIS AREAS   glipiZIDE (GLUCOTROL XL) 10 mg 24 hr tablet   Yes No   Sig: Take 10 mg by mouth daily with breakfast   levothyroxine 112 mcg tablet   No No   Sig: Take 1 tablet (112 mcg total) by mouth every morning      Facility-Administered Medications: None       Past Medical History:   Diagnosis Date    Abnormal ECG 12/2015    Interventricular conduction relay disorder    Abnormal Pap smear of cervix 01/2015    See medical records from Dr. Rosa    Anxiety     Arthritis     hands    Diabetes mellitus (HCC)     Disease of thyroid gland     hypo    HPV (human papilloma virus) infection 01/2015    See medical records from Dr. Rosa    HTN (hypertension)     Hyperlipidemia     Hypertension     Irregular heart beat     interventricular. conduction relay disorder    Nausea 12/2015    since gastric sleeve    Ovarian cyst     Urinary tract infection     chronic issue    Varicella 03/1985       Past Surgical History:   Procedure Laterality Date    BARIATRIC SURGERY  Dec 2015    Gastric Sleeve    CERVICAL BIOPSY N/A 3/2/2017    Procedure: BIOPSY LEEP CERVIX;  Surgeon: Lon Rosa MD;  Location: [facility] MAIN OR;  Service:     CERVICAL BIOPSY  W/ LOOP ELECTRODE EXCISION  Mar 2017    See medical records from Dr. Rosa    ESOPHAGOGASTRODUODENOSCOPY      GASTRIC RESTRICTION SURGERY  12/2015    sleeve at age 40    KS EXC TUMOR SOFT TISS FACE&SCALP SUBFASCIAL 2 CM/> N/A 1/3/2024    Procedure: EXCISION OF FOREHEAD LIPOMA WITH COMPLEX CLOSURE;  Surgeon: Luz Diallo MD;  [facility] MAIN OR;  Service: Plastics    TONSILLECTOMY      adenoids-age 30    WISDOM TOOTH EXTRACTION      x4-age 18       Family History   Problem Relation Age of Onset    Diabetes Mother         diet controlled    Diabetes Father         insulin    Other Father         agent orange     I have reviewed and agree with the history as documented.    E-Cigarette/Vaping    E-Cigarette Use Never User      E-Cigarette/Vaping Substances      Social History     Tobacco Use    Smoking status: Former     Current packs/day: 0.00     Average packs/day: 0.3 packs/day for 10.0 years (2.5 ttl pk-yrs)     Types: Cigarettes     Start date: 1994     Quit date:      Years since quittin.2    Smokeless tobacco: Never   Vaping Use    Vaping status: Never Used   Substance Use Topics    Alcohol use: Yes     Alcohol/week: 6.0 standard drinks of alcohol     Types: 6 Standard drinks or equivalent per week     Comment: one drink of vodka daily    Drug use: Yes     Frequency: 7.0 times per week     Types: Marijuana     Comment: occasionally       Review of Systems   Constitutional: Negative.  Negative for chills, fatigue and fever.   HENT: Negative.  Negative for congestion, postnasal drip, rhinorrhea and sore throat.    Eyes: Negative.    Respiratory: Negative.  Negative for cough, shortness of breath and wheezing.    Cardiovascular: Negative.    Gastrointestinal: Negative.  Negative for abdominal pain, diarrhea, nausea and vomiting.   Endocrine: Negative.    Genitourinary: Negative.    Musculoskeletal: Negative.    Skin:  Positive for rash. Negative for color change, pallor and wound.   Neurological: Negative.    Hematological: Negative.    Psychiatric/Behavioral: Negative.     All other systems reviewed and are negative.      Physical Exam  Physical Exam  Vitals and nursing note reviewed.   Constitutional:       General: She is not in acute distress.     Appearance: Normal appearance. She is well-developed and normal weight. She is not diaphoretic.   HENT:      Head: Normocephalic and atraumatic.        Right Ear: External ear normal.      Left Ear: External ear normal.      Nose: Nose normal.      Mouth/Throat:      Pharynx: No oropharyngeal exudate.   Eyes:      General: No scleral icterus.        Right eye: No discharge.         Left eye: No discharge.      Conjunctiva/sclera: Conjunctivae normal.      Pupils: Pupils are equal, round, and reactive to  light.   Cardiovascular:      Rate and Rhythm: Normal rate and regular rhythm.      Pulses: Normal pulses.      Heart sounds: Normal heart sounds. No murmur heard.     No friction rub. No gallop.   Pulmonary:      Effort: Pulmonary effort is normal. No respiratory distress.      Breath sounds: Normal breath sounds. No stridor. No wheezing, rhonchi or rales.   Chest:      Chest wall: No tenderness.   Abdominal:      General: Bowel sounds are normal. There is no distension.      Palpations: Abdomen is soft. There is no mass.      Tenderness: There is no abdominal tenderness. There is no guarding or rebound.      Hernia: No hernia is present.   Musculoskeletal:      Cervical back: Normal range of motion and neck supple.   Lymphadenopathy:      Cervical: No cervical adenopathy.   Skin:     General: Skin is warm and dry.      Capillary Refill: Capillary refill takes less than 2 seconds.      Coloration: Skin is not pale.      Findings: Rash present. No erythema.   Neurological:      General: No focal deficit present.      Mental Status: She is alert and oriented to person, place, and time. Mental status is at baseline.   Psychiatric:         Mood and Affect: Mood normal.         Thought Content: Thought content normal.         Judgment: Judgment normal.         Vital Signs  ED Triage Vitals [05/05/24 1116]   Temp Pulse Respirations Blood Pressure SpO2   -- 68 18 (!) 171/101 99 %      Temp src Heart Rate Source Patient Position - Orthostatic VS BP Location FiO2 (%)   -- Monitor Sitting Left arm --      Pain Score       --           Vitals:    05/05/24 1116   BP: (!) 171/101   Pulse: 68   Patient Position - Orthostatic VS: Sitting         Visual Acuity      ED Medications  Medications   predniSONE tablet 60 mg (60 mg Oral Given 5/5/24 1136)       Diagnostic Studies  Results Reviewed       None                   No orders to display              Procedures  Procedures         ED Course                                              Medical Decision Making  Patient started with a perioral dermatitis type rash that was exacerbated by the bee sting.  She is trying to follow-up with dermatology which I suggested she do, advised to avoid any topical products including over-the-counter steroids etc. until she is seen by dermatology.  States she is allergic to Benadryl, in the meantime given the bee sting as well will start an oral prednisone course, and advise gentle use of baby shampoo as a cleanser.    Patient is informed to return to the emergency department for worsening of symptoms and was given proper education regarding their diagnosis and symptoms. Otherwise the patient is informed to follow up with their primary care doctor and dermatology for re-evaluation. The patient verbalizes understanding and agrees with above assessment and plan. All questions were answered.         Risk  Prescription drug management.             Disposition  Final diagnoses:   Perioral dermatitis   Bee sting     Time reflects when diagnosis was documented in both MDM as applicable and the Disposition within this note       Time User Action Codes Description Comment    5/5/2024 11:32 AM Keren Bourne [L71.0] Perioral dermatitis     5/5/2024 11:32 AM Keren Bourne Add [T63.441A] Bee sting           ED Disposition       ED Disposition   Discharge    Condition   Stable    Date/Time   Sun May 5, 2024 11:31 AM    Comment   Dannie Mcgarry discharge to home/self care.                   Follow-up Information       Follow up With Specialties Details Why Contact Info Additional Information    [facility] Emergency Department Emergency Medicine Go to  If symptoms worsen, otherwise please follow up with your family doctor and dermatitis [address] [place] [id number] [phone] [facility] Emergency Department, [address] [place] [address], [id number]             Discharge Medication List as of 5/5/2024 11:33 AM        START taking these medications    Details   predniSONE 20 mg tablet Take 2 tablets (40 mg total) by mouth daily for 4 days, Starting Sun 5/5/2024, Until Thu 5/9/2024, Normal           CONTINUE these medications which have NOT CHANGED    Details   amLODIPine (NORVASC) 10 mg tablet Take 10 mg by mouth daily, Starting Thu 10/12/2023, Historical Med      Ascorbic Acid (MIGUEL-C PO) Take by mouth daily after breakfast, Historical Med      atorvastatin (LIPITOR) 10 mg tablet Take 10 mg by mouth daily, Starting Thu 10/12/2023, Historical Med      BIOTIN PO Take 30,000 mcg by mouth every morning, Historical Med      calcipotriene (DOVONOX) 0.005 % ointment APPLY ONCE A DAY TO AFFECTED PSORIASIS AREAS, Historical Med      Cetirizine HCl (ZYRTEC ALLERGY PO) Take 20 mg by mouth every morning, Historical Med      Cyanocobalamin (B-12) 5000 MCG CAPS Take 5,000 mcg by mouth daily, Historical Med      glipiZIDE (GLUCOTROL XL) 10 mg 24 hr tablet Take 10 mg by mouth daily with breakfast, Starting Thu 10/12/2023, Historical Med      Jardiance 10 MG TABS tablet Take 10 mg by mouth daily, Starting Fri 10/13/2023, Historical Med      levothyroxine 112 mcg tablet Take 1 tablet (112 mcg total) by mouth every morning, Starting Mon 3/30/2020, Normal      Mounjaro 5 MG/0.5ML Inject 5 mg under the skin every 7 days, Starting Thu 11/30/2023, Historical Med      PARoxetine (PAXIL) 30 mg tablet TAKE ONE TABLET BY MOUTH EVERY DAY IN THE MORNING, Normal             No discharge procedures on file.    PDMP Review         Value Time User    PDMP Reviewed  Yes 1/3/2024 12:56 PM Cassie Austin PA-C            ED Provider  Electronically Signed by             Keren Bourne PA-C  05/05/24 [id number]

## 2024-07-22 ENCOUNTER — OFFICE VISIT (OUTPATIENT)
Dept: FAMILY MEDICINE CLINIC | Facility: CLINIC | Age: 49
End: 2024-07-22
Payer: COMMERCIAL

## 2024-07-22 VITALS
SYSTOLIC BLOOD PRESSURE: 110 MMHG | OXYGEN SATURATION: 98 % | HEART RATE: 65 BPM | HEIGHT: 64 IN | TEMPERATURE: 97.6 F | BODY MASS INDEX: 31.92 KG/M2 | DIASTOLIC BLOOD PRESSURE: 70 MMHG | WEIGHT: 187 LBS | RESPIRATION RATE: 18 BRPM

## 2024-07-22 DIAGNOSIS — Z12.39 SCREENING BREAST EXAMINATION: ICD-10-CM

## 2024-07-22 DIAGNOSIS — E11.9 TYPE 2 DIABETES MELLITUS WITHOUT COMPLICATION, WITHOUT LONG-TERM CURRENT USE OF INSULIN (HCC): Primary | ICD-10-CM

## 2024-07-22 DIAGNOSIS — R76.8 POSITIVE SM/RNP ANTIBODY: ICD-10-CM

## 2024-07-22 DIAGNOSIS — Z12.11 COLON CANCER SCREENING: ICD-10-CM

## 2024-07-22 DIAGNOSIS — I10 ESSENTIAL HYPERTENSION: ICD-10-CM

## 2024-07-22 DIAGNOSIS — E78.2 HYPERLIPEMIA, MIXED: ICD-10-CM

## 2024-07-22 DIAGNOSIS — Z23 NEED FOR VACCINATION: ICD-10-CM

## 2024-07-22 DIAGNOSIS — L30.9 DERMATITIS: ICD-10-CM

## 2024-07-22 DIAGNOSIS — F41.9 ANXIETY: ICD-10-CM

## 2024-07-22 DIAGNOSIS — E03.9 HYPOTHYROIDISM, UNSPECIFIED TYPE: ICD-10-CM

## 2024-07-22 PROBLEM — R73.01 IMPAIRED FASTING GLUCOSE: Status: RESOLVED | Noted: 2018-10-23 | Resolved: 2024-07-22

## 2024-07-22 PROCEDURE — 90471 IMMUNIZATION ADMIN: CPT

## 2024-07-22 PROCEDURE — 99204 OFFICE O/P NEW MOD 45 MIN: CPT | Performed by: INTERNAL MEDICINE

## 2024-07-22 PROCEDURE — 90715 TDAP VACCINE 7 YRS/> IM: CPT

## 2024-07-22 RX ORDER — GLIPIZIDE 5 MG/1
5 TABLET, FILM COATED, EXTENDED RELEASE ORAL
Qty: 90 TABLET | Refills: 1 | Status: SHIPPED | OUTPATIENT
Start: 2024-07-22

## 2024-07-22 NOTE — ASSESSMENT & PLAN NOTE
Lab Results   Component Value Date    HGBA1C 7.3 (H) 12/27/2023     Most recent hemoglobin A1C 5.9.  Will continue jardiance and mounjaro, decrease glipizide to 5 mg daily.  Recheck labs prior to follow up visit in 4 months.

## 2024-07-22 NOTE — PROGRESS NOTES
Ambulatory Visit  Name: Dannie Mcgarry      : 1975      MRN: [id number]  Encounter Provider: Radha Carrillo MD  Encounter Date: 2024   Encounter department: [facility]    Assessment & Plan   1. Type 2 diabetes mellitus without complication, without long-term current use of insulin ([facility])  Assessment & Plan:    Lab Results   Component Value Date    HGBA1C 7.3 (H) 2023     Most recent hemoglobin A1C 5.9.  Will continue jardiance and mounjaro, decrease glipizide to 5 mg daily.  Recheck labs prior to follow up visit in 4 months.   Orders:  -     glipiZIDE (GLUCOTROL XL) 5 mg 24 hr tablet; Take 1 tablet (5 mg total) by mouth daily with breakfast  -     Hemoglobin A1C; Future; Expected date: 2024  -     Comprehensive metabolic panel; Future; Expected date: 2024  -     CBC and Platelet; Future; Expected date: 2024  -     Lipid Panel with Direct LDL reflex; Future; Expected date: 2024  -     Hemoglobin A1C  -     Comprehensive metabolic panel  -     CBC and Platelet  -     Lipid Panel with Direct LDL reflex  2. Essential hypertension  Assessment & Plan:  Well controlled on current medications, will continue as ordered.   Orders:  -     Hemoglobin A1C; Future; Expected date: 2024  -     Comprehensive metabolic panel; Future; Expected date: 2024  -     CBC and Platelet; Future; Expected date: 2024  -     Lipid Panel with Direct LDL reflex; Future; Expected date: 2024  -     Hemoglobin A1C  -     Comprehensive metabolic panel  -     CBC and Platelet  -     Lipid Panel with Direct LDL reflex  3. Hyperlipemia, mixed  Assessment & Plan:  Reviewed recent labs with patient and this is well controlled, continue atorvastatin.  Orders:  -     Hemoglobin A1C; Future; Expected date: 2024  -     Comprehensive metabolic panel; Future; Expected date: 2024  -     CBC and Platelet; Future; Expected date: 2024  -     Lipid Panel with Direct LDL  reflex; Future; Expected date: 11/19/2024  -     Hemoglobin A1C  -     Comprehensive metabolic panel  -     CBC and Platelet  -     Lipid Panel with Direct LDL reflex  4. Hypothyroidism, unspecified type  Assessment & Plan:  Recent TSH depressed but free T4 normal. Will recheck.  For now will continue current dose of levothyroxine.  Orders:  -     TSH, 3rd generation with Free T4 reflex; Future; Expected date: 09/02/2024  -     TSH, 3rd generation with Free T4 reflex  5. Anxiety  Assessment & Plan:  She feels this is well controlled on current dose of paroxetine and will continue as ordered.  Asked patient to call sooner than next scheduled appointment for any complaints or issues.    6. Positive sm/RNP antibody  -     Ambulatory Referral to Rheumatology; Future  7. Dermatitis  -     Ambulatory Referral to Rheumatology; Future  8. Screening breast examination  -     Mammo screening bilateral w 3d & cad; Future  9. Colon cancer screening  -     Ambulatory Referral to Gastroenterology; Future  10. Need for vaccination  -     TDAP VACCINE GREATER THAN OR EQUAL TO 8YO IM         History of Present Illness     Here to reestablish.  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  Recently was seen for a rash after beesting, and her RNP was very positive.  It was a butterfly rash and has since resolved. She does have joint pain but her job is very physical.  There is no chest pain.  She would like a referral to rheumatology.  There is no family history of rheumatologic diseases.  Has a history of hypertension and diabetes.  Last labwork was scanned into chart, hemoglobin A1C is 5.9. She denies hypoglycemic symptoms, but does not check her glucose readings.  Has been on mounjaro and has lost weight with this.      Review of Systems   Constitutional: Negative.  Negative for chills and fever.   HENT: Negative.  Negative for ear pain and sore throat.    Eyes: Negative.  Negative for pain and visual disturbance.    Respiratory: Negative.  Negative for cough and shortness of breath.    Cardiovascular: Negative.  Negative for chest pain and palpitations.   Gastrointestinal: Negative.  Negative for abdominal pain and vomiting.   Endocrine: Negative.  Negative for polydipsia, polyphagia and polyuria.   Genitourinary: Negative.  Negative for dysuria and hematuria.   Musculoskeletal: Negative.  Negative for arthralgias and back pain.   Skin: Negative.  Negative for color change and rash.   Allergic/Immunologic: Negative.    Neurological: Negative.  Negative for seizures and syncope.   Hematological: Negative.    Psychiatric/Behavioral: Negative.     All other systems reviewed and are negative.    Past Medical History:   Diagnosis Date   • Abnormal ECG 12/2015    Interventricular conduction relay disorder   • Abnormal Pap smear of cervix 01/2015    See medical records from Dr. Rosa   • Anxiety    • Arthritis     hands   • Diabetes mellitus (HCC)    • Disease of thyroid gland     hypo   • HPV (human papilloma virus) infection 01/2015    See medical records from Dr. Rosa   • HTN (hypertension)    • Hyperlipidemia    • Hypertension    • Irregular heart beat     interventricular. conduction relay disorder   • Nausea 12/2015    since gastric sleeve   • Ovarian cyst    • Urinary tract infection     chronic issue   • Varicella 03/1985     Past Surgical History:   Procedure Laterality Date   • BARIATRIC SURGERY  Dec 2015    Gastric Sleeve   • CERVICAL BIOPSY N/A 3/2/2017    Procedure: BIOPSY LEEP CERVIX;  Surgeon: Lon Rosa MD;  Location: [facility];  Service:    • CERVICAL BIOPSY  W/ LOOP ELECTRODE EXCISION  Mar 2017    See medical records from Dr. Rosa   • ESOPHAGOGASTRODUODENOSCOPY     • GASTRIC RESTRICTION SURGERY  12/2015    sleeve at age 40   • CO EXC TUMOR SOFT TISS FACE&SCALP SUBFASCIAL 2 CM/> N/A 1/3/2024    Procedure: EXCISION OF FOREHEAD LIPOMA WITH COMPLEX CLOSURE;  Surgeon: Luz Diallo MD;  Location: [facility]  OR;  Service: Plastics   • TONSILLECTOMY      adenoids-age 30   • WISDOM TOOTH EXTRACTION      x4-age 18     Family History   Problem Relation Age of Onset   • Diabetes Mother         diet controlled   • Diabetes Father         insulin   • Other Father         agent orange     Social History     Tobacco Use   • Smoking status: Former     Current packs/day: 0.00     Average packs/day: 0.3 packs/day for 10.0 years (2.5 ttl pk-yrs)     Types: Cigarettes     Start date: 1994     Quit date:      Years since quittin.4     Passive exposure: Past   • Smokeless tobacco: Never   Vaping Use   • Vaping status: Never Used   Substance and Sexual Activity   • Alcohol use: Yes     Alcohol/week: 6.0 standard drinks of alcohol     Types: 6 Standard drinks or equivalent per week     Comment: 3-4 weekly Vodka   • Drug use: Yes     Frequency: 7.0 times per week     Types: Marijuana     Comment: occasionally   • Sexual activity: Not on file     Current Outpatient Medications on File Prior to Visit   Medication Sig   • amLODIPine (NORVASC) 10 mg tablet Take 10 mg by mouth daily   • atorvastatin (LIPITOR) 10 mg tablet Take 10 mg by mouth daily   • BIOTIN PO Take 30,000 mcg by mouth every morning   • Cetirizine HCl (ZYRTEC ALLERGY PO) Take 20 mg by mouth every morning   • Jardiance 10 MG TABS tablet Take 10 mg by mouth daily   • levothyroxine 112 mcg tablet Take 1 tablet (112 mcg total) by mouth every morning   • Mounjaro 5 MG/0.5ML Inject 5 mg under the skin every 7 days   • PARoxetine (PAXIL) 30 mg tablet TAKE ONE TABLET BY MOUTH EVERY DAY IN THE MORNING   • [DISCONTINUED] glipiZIDE (GLUCOTROL XL) 10 mg 24 hr tablet Take 10 mg by mouth daily with breakfast   • [DISCONTINUED] Ascorbic Acid (MIGUEL-C PO) Take by mouth daily after breakfast   • [DISCONTINUED] calcipotriene (DOVONOX) 0.005 % ointment APPLY ONCE A DAY TO AFFECTED PSORIASIS AREAS   • [DISCONTINUED] Cyanocobalamin (B-12) 5000 MCG CAPS Take 5,000 mcg by mouth daily  "    Allergies   Allergen Reactions   • Vivian (Diagnostic) - Food Allergy Anaphylaxis     All stone nuts. Lips/tongue swell-Birch Tree allergy   • Bee Venom Anaphylaxis     All over swelling/pain/redness   • Soy Lecithin [Lecithin] Anaphylaxis     [place]/throat swelling-dyspnea  Pt is only allergic to SOY   • Codeine Nausea Only     Immunization History   Administered Date(s) Administered   • INFLUENZA 10/16/2018, 07/02/2019   • Tdap 12/11/2014, 07/22/2024     Objective     /70   Pulse 65   Temp 97.6 °F (36.4 °C)   Resp 18   Ht 5' 4\" (1.626 m)   Wt 84.8 kg (187 lb)   LMP 11/22/2020 (Approximate)   SpO2 98%   BMI 32.10 kg/m²     Physical Exam  Constitutional:       General: She is not in acute distress.     Appearance: She is well-developed. She is not diaphoretic.   HENT:      Head: Normocephalic and atraumatic.      Right Ear: External ear normal.      Left Ear: External ear normal.      Nose: Nose normal.      Mouth/Throat:      Mouth: Oropharynx is clear and moist.   Eyes:      Extraocular Movements: EOM normal.      Pupils: Pupils are equal, round, and reactive to light.   Neck:      Thyroid: No thyromegaly.      Vascular: No JVD.   Cardiovascular:      Rate and Rhythm: Regular rhythm.      Pulses: no weak pulses.           Dorsalis pedis pulses are 2+ on the right side and 2+ on the left side.      Heart sounds: No murmur heard.     No friction rub. No gallop.   Pulmonary:      Effort: Pulmonary effort is normal.      Breath sounds: Normal breath sounds. No wheezing or rales.   Abdominal:      General: Bowel sounds are normal. There is no distension.      Palpations: Abdomen is soft.      Tenderness: There is no abdominal tenderness.   Musculoskeletal:         General: No edema. Normal range of motion.      Cervical back: Normal range of motion and neck supple.   Feet:      Right foot:      Skin integrity: No ulcer, skin breakdown, erythema, warmth, callus or dry skin.      Left foot:      Skin " integrity: No ulcer, skin breakdown, erythema, warmth, callus or dry skin.   Skin:     General: Skin is warm and dry.      Findings: No rash.   Neurological:      Mental Status: She is alert and oriented to person, place, and time.      Cranial Nerves: No cranial nerve deficit.      Sensory: No sensory deficit.      Motor: No abnormal muscle tone.      Coordination: Coordination normal.      Deep Tendon Reflexes: Reflexes normal.   Psychiatric:         Mood and Affect: Mood and affect normal.         Behavior: Behavior normal.         Thought Content: Thought content normal.         Judgment: Judgment normal.     Patient's shoes and socks removed.    Right Foot/Ankle   Right Foot Inspection  Skin Exam: skin normal and skin intact. No dry skin, no warmth, no callus, no erythema, no maceration, no abnormal color, no pre-ulcer, no ulcer and no callus.     Toe Exam: ROM and strength within normal limits.     Sensory   Monofilament testing: intact    Vascular  Capillary refills: < 3 seconds  The right DP pulse is 2+.     Left Foot/Ankle  Left Foot Inspection  Skin Exam: skin normal and skin intact. No dry skin, no warmth, no erythema, no maceration, normal color, no pre-ulcer, no ulcer and no callus.     Toe Exam: ROM and strength within normal limits.     Sensory   Monofilament testing: intact    Vascular  Capillary refills: < 3 seconds  The left DP pulse is 2+.     Assign Risk Category  No deformity present  No loss of protective sensation  No weak pulses  Risk: 0

## 2024-07-22 NOTE — ASSESSMENT & PLAN NOTE
----- Message from Luis Alberto Asher MD sent at 1/31/2023  7:56 AM EST -----  Galilea, please let her know the vaginal culture STD screen for Chlamydia, Gonorrhea and trichomoniasis is negative. Thanks, Dr. Asher   Recent TSH depressed but free T4 normal. Will recheck.  For now will continue current dose of levothyroxine.

## 2024-07-22 NOTE — ASSESSMENT & PLAN NOTE
She feels this is well controlled on current dose of paroxetine and will continue as ordered.  Asked patient to call sooner than next scheduled appointment for any complaints or issues.

## 2024-07-23 ENCOUNTER — TELEPHONE (OUTPATIENT)
Dept: ADMINISTRATIVE | Facility: OTHER | Age: 49
End: 2024-07-23

## 2024-07-23 NOTE — TELEPHONE ENCOUNTER
Upon review of the In Basket request we were able to locate, review, and update the patient chart as requested for eGFR, Hemoglobin A1c, and Microalbumin Creatinine Urine Ratio OR Albumin Creatinine Urine Ratio .    Any additional questions or concerns should be emailed to the Practice Liaisons via the appropriate education email address, please do not reply via In Basket.    Thank you  Sherita Mcneil   [facility]

## 2024-07-23 NOTE — TELEPHONE ENCOUNTER
----- Message from Radha Carrillo MD sent at 7/22/2024  1:26 PM EDT -----  07/22/24 1:26 PM    Hello, our patient No patient name on file. has had Glomerular Filtration Rate (GFR), Hemoglobin A1c, and Urine Albumin/Creatinine Ratio completed/performed. Please assist in updating the patient chart by pulling the document from the Media Tab. The date of service is 7/22/24.     Thank you,  Radha Carrillo  [facility]

## 2024-09-17 LAB — TSH SERPL DL<=0.005 MIU/L-ACNC: 0.72 UIU/ML (ref 0.45–4.5)

## 2024-09-18 LAB
ALBUMIN SERPL-MCNC: 4.5 G/DL (ref 3.9–4.9)
ALP SERPL-CCNC: 130 IU/L (ref 44–121)
ALT SERPL-CCNC: 27 IU/L (ref 0–32)
AST SERPL-CCNC: 24 IU/L (ref 0–40)
BILIRUB SERPL-MCNC: 0.4 MG/DL (ref 0–1.2)
BUN SERPL-MCNC: 11 MG/DL (ref 6–24)
BUN/CREAT SERPL: 15 (ref 9–23)
CALCIUM SERPL-MCNC: 9.2 MG/DL (ref 8.7–10.2)
CHLORIDE SERPL-SCNC: 102 MMOL/L (ref 96–106)
CHOLEST SERPL-MCNC: 180 MG/DL (ref 100–199)
CO2 SERPL-SCNC: 27 MMOL/L (ref 20–29)
CREAT SERPL-MCNC: 0.71 MG/DL (ref 0.57–1)
EGFR: 105 ML/MIN/1.73
ERYTHROCYTE [DISTWIDTH] IN BLOOD BY AUTOMATED COUNT: 13.9 % (ref 11.7–15.4)
EST. AVERAGE GLUCOSE BLD GHB EST-MCNC: 114 MG/DL
GLOBULIN SER-MCNC: 2.5 G/DL (ref 1.5–4.5)
GLUCOSE SERPL-MCNC: 104 MG/DL (ref 70–99)
HBA1C MFR BLD: 5.6 % (ref 4.8–5.6)
HCT VFR BLD AUTO: 44.3 % (ref 34–46.6)
HDLC SERPL-MCNC: 57 MG/DL
HGB BLD-MCNC: 15.3 G/DL (ref 11.1–15.9)
LDLC SERPL CALC-MCNC: 102 MG/DL (ref 0–99)
LDLC/HDLC SERPL: 1.8 RATIO (ref 0–3.2)
MCH RBC QN AUTO: 33.3 PG (ref 26.6–33)
MCHC RBC AUTO-ENTMCNC: 34.5 G/DL (ref 31.5–35.7)
MCV RBC AUTO: 96 FL (ref 79–97)
MICRODELETION SYND BLD/T FISH: NORMAL
PLATELET # BLD AUTO: 185 X10E3/UL (ref 150–450)
POTASSIUM SERPL-SCNC: 4.2 MMOL/L (ref 3.5–5.2)
PROT SERPL-MCNC: 7 G/DL (ref 6–8.5)
RBC # BLD AUTO: 4.6 X10E6/UL (ref 3.77–5.28)
SL AMB VLDL CHOLESTEROL CALC: 21 MG/DL (ref 5–40)
SODIUM SERPL-SCNC: 143 MMOL/L (ref 134–144)
TRIGL SERPL-MCNC: 120 MG/DL (ref 0–149)
WBC # BLD AUTO: 3.6 X10E3/UL (ref 3.4–10.8)

## 2024-11-23 ENCOUNTER — APPOINTMENT (EMERGENCY)
Dept: RADIOLOGY | Facility: HOSPITAL | Age: 49
End: 2024-11-23
Payer: COMMERCIAL

## 2024-11-23 ENCOUNTER — HOSPITAL ENCOUNTER (EMERGENCY)
Facility: HOSPITAL | Age: 49
Discharge: HOME | End: 2024-11-23
Attending: EMERGENCY MEDICINE
Payer: COMMERCIAL

## 2024-11-23 VITALS
SYSTOLIC BLOOD PRESSURE: 162 MMHG | DIASTOLIC BLOOD PRESSURE: 103 MMHG | OXYGEN SATURATION: 98 % | RESPIRATION RATE: 20 BRPM | HEART RATE: 65 BPM | TEMPERATURE: 98 F

## 2024-11-23 DIAGNOSIS — M25.539 WRIST PAIN: Primary | ICD-10-CM

## 2024-11-23 PROCEDURE — 73110 X-RAY EXAM OF WRIST: CPT

## 2024-11-23 PROCEDURE — 99282 EMERGENCY DEPT VISIT SF MDM: CPT

## 2024-11-23 PROCEDURE — 99284 EMERGENCY DEPT VISIT MOD MDM: CPT | Performed by: PHYSICIAN ASSISTANT

## 2024-11-23 RX ORDER — OXYCODONE AND ACETAMINOPHEN 5; 325 MG/1; MG/1
1 TABLET ORAL ONCE
Refills: 0 | Status: COMPLETED | OUTPATIENT
Start: 2024-11-23 | End: 2024-11-23

## 2024-11-23 RX ORDER — OXYCODONE AND ACETAMINOPHEN 5; 325 MG/1; MG/1
1 TABLET ORAL EVERY 8 HOURS PRN
Qty: 6 TABLET | Refills: 0 | Status: SHIPPED | OUTPATIENT
Start: 2024-11-23 | End: 2024-11-25

## 2024-11-23 RX ADMIN — OXYCODONE HYDROCHLORIDE AND ACETAMINOPHEN 1 TABLET: 5; 325 TABLET ORAL at 16:42

## 2024-11-23 NOTE — Clinical Note
Dannie Mcgarry was seen and treated in our emergency department on 11/23/2024.                Diagnosis: wrist pain, cyst    Dannie  may return to work on return date.    She may return on this date: 11/29/2024         If you have any questions or concerns, please don't hesitate to call.      Keren Bourne PA-C    ______________________________           _______________          _______________  Hospital Representative                              Date                                Time

## 2024-11-23 NOTE — ED PROVIDER NOTES
Time reflects when diagnosis was documented in both MDM as applicable and the Disposition within this note       Time User Action Codes Description Comment    11/23/2024  5:26 PM SteffenKeren Add [M25.539] Wrist pain           ED Disposition       ED Disposition   Discharge    Condition   Stable    Date/Time   Sat Nov 23, 2024  5:26 PM    Comment   Dannie Mcgarry discharge to home/self care.                   Assessment & Plan       Medical Decision Making  No acute osseous abnormality as read by myself on x-ray.  Palpable cyst noted with some swelling, no signs of infection.  Patient was placed in an Ace wrap and also given a wrist brace.  Feeling better in the wrist brace with good neurovascular exam before and after.  Patient has significant discomfort and performs repetitive movements at work.  Does not tolerate prednisone very well and has been taking over-the-counter medications with minimal relief.  Will prescribe a topical diclofenac and give a few doses of Percocet for severe breakthrough pain.  Informed not to drive or operate her machinery while taking.  Otherwise advised she will need to follow-up with orthopedics for reevaluation of her symptoms.  She has an upcoming rheumatology appointment as well.  Informed return for worsening or signs of infection etc.  Patient verbalized understanding and agrees with the above assessment and plan.    Amount and/or Complexity of Data Reviewed  Radiology: ordered and independent interpretation performed.    Risk  Prescription drug management.             Medications   oxyCODONE-acetaminophen (PERCOCET) 5-325 mg per tablet 1 tablet (1 tablet Oral Given 11/23/24 1642)       ED Risk Strat Scores                                               History of Present Illness       Chief Complaint   Patient presents with    Cyst     Noted lump to R wrist area couple of days ago, increased in size significantly since and very painful       Past Medical History:    Diagnosis Date    Abnormal ECG 2015    Interventricular conduction relay disorder    Abnormal Pap smear of cervix 2015    See medical records from Dr. Rosa    Anxiety     Arthritis     hands    Diabetes mellitus (HCC)     Disease of thyroid gland     hypo    HPV (human papilloma virus) infection 2015    See medical records from Dr. Rosa    HTN (hypertension)     Hyperlipidemia     Hypertension     Irregular heart beat     interventricular. conduction relay disorder    Nausea 2015    since gastric sleeve    Ovarian cyst     Urinary tract infection     chronic issue    Varicella 1985      Past Surgical History:   Procedure Laterality Date    BARIATRIC SURGERY  Dec 2015    Gastric Sleeve    CERVICAL BIOPSY N/A 3/2/2017    Procedure: BIOPSY LEEP CERVIX;  Surgeon: Lon Rosa MD;  Location: [facility];  Service:     CERVICAL BIOPSY  W/ LOOP ELECTRODE EXCISION  Mar 2017    See medical records from Dr. Rosa    ESOPHAGOGASTRODUODENOSCOPY      GASTRIC RESTRICTION SURGERY  2015    sleeve at age 40    CA EXC TUMOR SOFT TISS FACE&SCALP SUBFASCIAL 2 CM/> N/A 1/3/2024    Procedure: EXCISION OF FOREHEAD LIPOMA WITH COMPLEX CLOSURE;  Surgeon: Luz Diallo MD;  Location: [facility] MAIN OR;  Service: Plastics    TONSILLECTOMY      adenoids-age 30    WISDOM TOOTH EXTRACTION      x4-age 18      Family History   Problem Relation Age of Onset    Diabetes Mother         diet controlled    Diabetes Father         insulin    Other Father         agent orange      Social History     Tobacco Use    Smoking status: Former     Current packs/day: 0.00     Average packs/day: 0.3 packs/day for 10.0 years (2.5 ttl pk-yrs)     Types: Cigarettes     Start date: 1994     Quit date:      Years since quittin.8     Passive exposure: Past    Smokeless tobacco: Never   Vaping Use    Vaping status: Never Used   Substance Use Topics    Alcohol use: Yes     Alcohol/week: 6.0 standard drinks of alcohol     Types: 6  Standard drinks or equivalent per week     Comment: 3-4 weekly Vodka    Drug use: Yes     Frequency: 7.0 times per week     Types: Marijuana     Comment: occasionally      E-Cigarette/Vaping    E-Cigarette Use Never User       E-Cigarette/Vaping Substances    Nicotine No     THC No     CBD No     Flavoring No     Other No     Unknown No       I have reviewed and agree with the history as documented.     49-year-old female presenting today with severe right-sided wrist pain that began over the past 2 days.  Patient relays that she has had a cystic like mass on her wrist for many years however over the past few days it has grown in size and has become more swollen and significantly painful.  States that she performs repetitive movements every day at work and believes work likely exacerbated the discomfort.  States that now she has severe pain with any type of movement or gripping motions.  Has not had any skin discoloration falls or injuries.  Relays that she was recently diagnosed with lupus.  States that she has bad reactions when taking prednisone and does not tolerate the side effects very well.  Patient icing the wrist with minimal relief.  States that she has also been under large amounts of stress, teary-eyed.        Review of Systems   Constitutional: Negative.  Negative for chills, fatigue and fever.   HENT: Negative.  Negative for congestion, postnasal drip, rhinorrhea and sore throat.    Eyes: Negative.    Respiratory: Negative.  Negative for cough, shortness of breath and wheezing.    Cardiovascular: Negative.    Gastrointestinal: Negative.  Negative for abdominal pain, diarrhea, nausea and vomiting.   Endocrine: Negative.    Genitourinary: Negative.    Musculoskeletal:  Positive for arthralgias and joint swelling. Negative for back pain, gait problem, myalgias, neck pain and neck stiffness.   Skin: Negative.    Neurological: Negative.    Hematological: Negative.    Psychiatric/Behavioral: Negative.      All other systems reviewed and are negative.          Objective       ED Triage Vitals [11/23/24 1605]   Temperature Pulse Blood Pressure Respirations SpO2 Patient Position - Orthostatic VS   98 °F (36.7 °C) 65 (!) 162/103 20 98 % Sitting      Temp Source Heart Rate Source BP Location FiO2 (%) Pain Score    Tympanic Monitor Left arm -- 8      Vitals      Date and Time Temp Pulse SpO2 Resp BP Pain Score FACES Pain Rating User   11/23/24 1642 -- -- -- -- -- 9 -- KR   11/23/24 1605 98 °F (36.7 °C) 65 98 % 20 162/103 8 -- LS            Physical Exam  Vitals and nursing note reviewed.   Constitutional:       Appearance: Normal appearance.   HENT:      Head: Normocephalic and atraumatic.      Right Ear: External ear normal.      Left Ear: External ear normal.      Nose: Nose normal.   Eyes:      Conjunctiva/sclera: Conjunctivae normal.   Cardiovascular:      Rate and Rhythm: Normal rate.      Pulses: Normal pulses.   Pulmonary:      Effort: Pulmonary effort is normal.   Abdominal:      General: There is no distension.   Musculoskeletal:         General: No deformity. Normal range of motion.        Arms:       Cervical back: Normal range of motion.   Skin:     General: Skin is warm and dry.      Capillary Refill: Capillary refill takes less than 2 seconds.      Findings: No rash.   Neurological:      General: No focal deficit present.      Mental Status: She is alert and oriented to person, place, and time. Mental status is at baseline.   Psychiatric:         Mood and Affect: Mood normal.         Behavior: Behavior normal.         Thought Content: Thought content normal.         Judgment: Judgment normal.         Results Reviewed       None            XR wrist 3+ views RIGHT   ED Interpretation by Keren Bourne PA-C (11/23 1707)   No acute osseous abnormality.           Procedures    ED Medication and Procedure Management   Prior to Admission Medications   Prescriptions Last Dose Informant Patient Reported? Taking?    BIOTIN PO  Self Yes No   Sig: Take 30,000 mcg by mouth every morning   Cetirizine HCl (ZYRTEC ALLERGY PO)  Self Yes No   Sig: Take 20 mg by mouth every morning   Jardiance 10 MG TABS tablet   No No   Sig: Take 1 tablet (10 mg total) by mouth daily   Mounjaro 5 MG/0.5ML   No No   Sig: Inject 0.5 mL (5 mg total) under the skin every 7 days   PARoxetine (PAXIL) 30 mg tablet   No No   Sig: Take 1 tablet (30 mg total) by mouth every morning   amLODIPine (NORVASC) 10 mg tablet   No No   Sig: Take 1 tablet (10 mg total) by mouth daily   atorvastatin (LIPITOR) 10 mg tablet   No No   Sig: Take 1 tablet (10 mg total) by mouth daily   glipiZIDE (GLUCOTROL XL) 5 mg 24 hr tablet   No No   Sig: Take 1 tablet (5 mg total) by mouth daily with breakfast   levothyroxine 112 mcg tablet   No No   Sig: Take 1 tablet (112 mcg total) by mouth every morning      Facility-Administered Medications: None     Patient's Medications   Discharge Prescriptions    DICLOFENAC SODIUM (VOLTAREN) 1 %    Apply 2 g topically 4 (four) times a day To the right wrist x 7 days       Start Date: 11/23/2024End Date: --       Order Dose: 2 g       Quantity: 240 g    Refills: 0    OXYCODONE-ACETAMINOPHEN (PERCOCET) 5-325 MG PER TABLET    Take 1 tablet by mouth every 8 (eight) hours as needed for severe pain for up to 2 days Max Daily Amount: 3 tablets       Start Date: 11/23/2024End Date: 11/25/2024       Order Dose: 1 tablet       Quantity: 6 tablet    Refills: 0     No discharge procedures on file.  ED SEPSIS DOCUMENTATION   Time reflects when diagnosis was documented in both MDM as applicable and the Disposition within this note       Time User Action Codes Description Comment    11/23/2024  5:26 PM Keren Bourne Add [M25.539] Wrist pain                  Keren Bourne PA-C  11/23/24 6441

## 2024-11-24 NOTE — PROGRESS NOTES
Name: Dannie Mcgarry      : 1975      MRN: [id number]  Encounter Provider: Anu Chatman MD  Encounter Date: 2024   Encounter department: [facility] [address]  :  Assessment & Plan  Mixed connective tissue disease (HCC)  Pt has high titer RNP ab and +RESHMA (titer not available) and features of ? Inflammatory arthritis like RA vs tightness in the skin of fingers that could be similar to scleroderma, also has Raynaud's ,dysphagia, etc. At this time not enough evidence that she meets criteria for a specific rheumatologic condition but will order a few more subserologies.   Plan:  Labs for further workup  Depending on lab results, will consider HCQ or Cellcept as treatment options  RTC in 6-8 weeks  If labs suspicious for scleroderma, will add on further testing such as baseline echo, HRCT and PFTs  Orders:    RF Screen w/ Reflex to Titer; Future    Cyclic citrul peptide antibody, IgG; Future    Sedimentation rate, automated; Future    C-reactive protein; Future    RNA Polymerase III Ab; Future    U3 RNP; Future    PM/SCL ANTIBODIES; Future    Anti-RNP antibodies present    Orders:    Ambulatory Referral to Rheumatology    RF Screen w/ Reflex to Titer; Future    Cyclic citrul peptide antibody, IgG; Future    Sedimentation rate, automated; Future    C-reactive protein; Future    RNA Polymerase III Ab; Future    U3 RNP; Future    PM/SCL ANTIBODIES; Future        History of Present Illness     HPI  Dannie Mcgarry is a 49 y.o. female who presents for evaluation of + RNP ab. Pt has hx of psoriasis.    She had widespread skin involvement of psoriasis in the past, now on the extensor surfaces (elbows, back of knees). She is using topical clobetasol. Not been on any biologics. Saw Dermatology in April.     She had one isolated month-long episode of malar-type rash following Bee sting on the face. No photosensitivity.    In  she had blood work done, found to have + RESHMA (titer not available),  +RNP 7.5, other labs showed negative scl-70, centromere ab, SSA/SSB, dsDNA, Ward, anti-Jo1 ab.    She was told she had lupus.     She has been having a flare with inflammation/swelling in the hands bilaterally, and morning stiffness lasts all day. Her symptoms started 1 month ago.     She does mention some dry eyes, she uses eye drops (Systane).     She has a right wrist ganglion cyst, planning to have surgery.    + Raynaud's in the hands.     She has had numbness/tingling in the left hand, whole hand.    Denies fatigue, weight changes, hair loss, vision changes, erythema or pain in the eyes, sicca symptoms, ulcers in the mouth/genital area, swollen glands, dysphagia,  rashes (including malar, psoriasis, photosensitivity, skin tightening/thickening), bumps under the skin (calcinosis, tophi, RA nodules), SOB, pleuritic chest pain, abdominal pain, changes in the stool,  bloody or foamy urine, LE edema, muscle weakness, joint hypermobility     Denies any autoimmune family history.    Denies history of DVT or PE, miscarriages, seizures or strokes.             Review of Systems   Constitutional: Negative.    HENT: Negative.     Eyes:         Dry eyes   Respiratory: Negative.     Cardiovascular: Negative.    Gastrointestinal: Negative.    Genitourinary: Negative.    Musculoskeletal:  Positive for arthralgias. Negative for joint swelling.   Skin: Negative.    Neurological:  Positive for numbness.     Past Medical History   Past Medical History:   Diagnosis Date    Abnormal ECG 12/2015    Interventricular conduction relay disorder    Abnormal Pap smear of cervix 01/2015    See medical records from Dr. Rosa    Anxiety     Arthritis     hands    Diabetes mellitus (HCC)     Disease of thyroid gland     hypo    HPV (human papilloma virus) infection 01/2015    See medical records from Dr. Rosa    HTN (hypertension)     Hyperlipidemia     Hypertension     Irregular heart beat     interventricular. conduction relay disorder     Nausea 12/2015    since gastric sleeve    Ovarian cyst     Urinary tract infection     chronic issue    Varicella 03/1985     Past Surgical History:   Procedure Laterality Date    BARIATRIC SURGERY  Dec 2015    Gastric Sleeve    CERVICAL BIOPSY N/A 3/2/2017    Procedure: BIOPSY LEEP CERVIX;  Surgeon: Lon Rosa MD;  Location: [facility];  Service:     CERVICAL BIOPSY  W/ LOOP ELECTRODE EXCISION  Mar 2017    See medical records from Dr. Rosa    ESOPHAGOGASTRODUODENOSCOPY      GASTRIC RESTRICTION SURGERY  12/2015    sleeve at age 40    CO EXC TUMOR SOFT TISS FACE&SCALP SUBFASCIAL 2 CM/> N/A 1/3/2024    Procedure: EXCISION OF FOREHEAD LIPOMA WITH COMPLEX CLOSURE;  Surgeon: Luz Diallo MD;  [facility];  Service: Plastics    TONSILLECTOMY      adenoids-age 30    WISDOM TOOTH EXTRACTION      x4-age 18     Family History   Problem Relation Age of Onset    Diabetes Mother         diet controlled    Diabetes Father         insulin    Other Father         agent wisam      reports that she quit smoking about 20 years ago. Her smoking use included cigarettes. She started smoking about 30 years ago. She has a 2.5 pack-year smoking history. She has been exposed to tobacco smoke. She has never used smokeless tobacco. She reports current alcohol use of about 6.0 standard drinks of alcohol per week. She reports current drug use. Frequency: 7.00 times per week. Drug: Marijuana.  Current Outpatient Medications on File Prior to Visit   Medication Sig Dispense Refill    Mounjaro 5 MG/0.5ML SOAJ INJECT 0.5 ML (5 MG TOTAL) UNDER THE SKIN EVERY 7 DAYS      amLODIPine (NORVASC) 10 mg tablet Take 1 tablet (10 mg total) by mouth daily 90 tablet 3    atorvastatin (LIPITOR) 10 mg tablet Take 1 tablet (10 mg total) by mouth daily 90 tablet 3    BIOTIN PO Take 30,000 mcg by mouth every morning (Patient not taking: Reported on 11/26/2024)      Cetirizine HCl (ZYRTEC ALLERGY PO) Take 20 mg by mouth every morning       "Diclofenac Sodium (VOLTAREN) 1 % Apply 2 g topically 4 (four) times a day To the right wrist x 7 days 240 g 0    glipiZIDE (GLUCOTROL XL) 5 mg 24 hr tablet Take 1 tablet (5 mg total) by mouth daily with breakfast 90 tablet 3    Jardiance 10 MG TABS tablet Take 1 tablet (10 mg total) by mouth daily 90 tablet 3    levothyroxine 112 mcg tablet Take 1 tablet (112 mcg total) by mouth every morning 30 tablet 5    Mounjaro 5 MG/0.5ML Inject 0.5 mL (5 mg total) under the skin every 7 days 2 mL 3    [] oxyCODONE-acetaminophen (Percocet) 5-325 mg per tablet Take 1 tablet by mouth every 8 (eight) hours as needed for severe pain for up to 2 days Max Daily Amount: 3 tablets 6 tablet 0    PARoxetine (PAXIL) 30 mg tablet Take 1 tablet (30 mg total) by mouth every morning 90 tablet 1     No current facility-administered medications on file prior to visit.     Allergies   Allergen Reactions    [place] (Diagnostic) - Food Allergy Anaphylaxis     All stone nuts. Lips/tongue swell-Birch Tree allergy    Bee Venom Anaphylaxis     All over swelling/pain/redness    Soy Allergy - Food Allergy Anaphylaxis, Hives, Itching, Palpitations and Swelling    Codeine Nausea Only           Objective   /80   Pulse 64   Temp (!) 97.1 °F (36.2 °C)   Ht 5' 4\" (1.626 m)   Wt 87.5 kg (193 lb)   LMP 2020 (Approximate)   BMI 33.13 kg/m²      Physical Exam  Constitutional:       Appearance: Normal appearance.   HENT:      Head: Normocephalic.   Eyes:      Extraocular Movements: Extraocular movements intact.      Pupils: Pupils are equal, round, and reactive to light.   Cardiovascular:      Pulses: Normal pulses.      Heart sounds: Normal heart sounds.   Pulmonary:      Effort: Pulmonary effort is normal.      Breath sounds: Normal breath sounds.   Musculoskeletal:      Comments: MSK Exam  The following areas have been examined today and do not show any signs of synovitis, tenderness, or restricted ROM unless otherwise specified " below:  Shoulders  Elbows  Wrists  Hands - Taut skin/puffiness in fingers bilaterally mainly PIP to DIPs  Hips  Knees  Ankles  Feet    Neurological:      Mental Status: She is alert and oriented to person, place, and time.

## 2024-11-26 ENCOUNTER — OFFICE VISIT (OUTPATIENT)
Dept: OBGYN CLINIC | Facility: CLINIC | Age: 49
End: 2024-11-26
Payer: COMMERCIAL

## 2024-11-26 ENCOUNTER — CONSULT (OUTPATIENT)
Age: 49
End: 2024-11-26
Payer: COMMERCIAL

## 2024-11-26 VITALS
HEART RATE: 64 BPM | DIASTOLIC BLOOD PRESSURE: 80 MMHG | HEIGHT: 64 IN | WEIGHT: 193 LBS | BODY MASS INDEX: 32.95 KG/M2 | SYSTOLIC BLOOD PRESSURE: 128 MMHG | TEMPERATURE: 97.1 F

## 2024-11-26 VITALS
SYSTOLIC BLOOD PRESSURE: 126 MMHG | DIASTOLIC BLOOD PRESSURE: 87 MMHG | HEART RATE: 64 BPM | BODY MASS INDEX: 32.95 KG/M2 | HEIGHT: 64 IN | WEIGHT: 193 LBS

## 2024-11-26 DIAGNOSIS — R76.8 ANTI-RNP ANTIBODIES PRESENT: ICD-10-CM

## 2024-11-26 DIAGNOSIS — M35.1 MIXED CONNECTIVE TISSUE DISEASE (HCC): Primary | ICD-10-CM

## 2024-11-26 DIAGNOSIS — M67.431 GANGLION CYST OF VOLAR ASPECT OF RIGHT WRIST: Primary | ICD-10-CM

## 2024-11-26 DIAGNOSIS — L30.9 DERMATITIS: ICD-10-CM

## 2024-11-26 PROCEDURE — 99204 OFFICE O/P NEW MOD 45 MIN: CPT | Performed by: STUDENT IN AN ORGANIZED HEALTH CARE EDUCATION/TRAINING PROGRAM

## 2024-11-26 RX ORDER — TIRZEPATIDE 5 MG/.5ML
INJECTION, SOLUTION SUBCUTANEOUS
COMMUNITY
Start: 2024-09-19

## 2024-11-26 RX ORDER — IBUPROFEN 800 MG/1
800 TABLET, FILM COATED ORAL EVERY 8 HOURS PRN
COMMUNITY
Start: 2024-10-10

## 2024-11-26 RX ORDER — ACETAMINOPHEN AND CODEINE PHOSPHATE 300; 30 MG/1; MG/1
1 TABLET ORAL EVERY 6 HOURS PRN
COMMUNITY
Start: 2024-10-03

## 2024-11-26 RX ORDER — LIDOCAINE 50 MG/G
1 PATCH TOPICAL DAILY
Qty: 30 PATCH | Refills: 0 | Status: SHIPPED | OUTPATIENT
Start: 2024-11-26 | End: 2024-12-06 | Stop reason: ALTCHOICE

## 2024-11-26 NOTE — PROGRESS NOTES
ORTHOPAEDIC HAND, WRIST, AND ELBOW OFFICE  VISIT     ASSESSMENT/PLAN:    Dannie Mcgarry is a 49 y.o. RHD female who presents with volar ganglion of right wrist    PLAN: The natural history of this condition and treatment options were discussed in detail. Treatment options include observation only with activity modifications, needle aspiration, and open surgical excision. The patient was informed that certain diagnosis can only be determined by histologic evaluation, but that this lesion appears to be benign in nature by clinical appearance. Given location we do recommend an U/S to evaluate.     Observation and expectant management a viable option. This would require observation for significant change in mass characteristic such as increasing size, or associated skin changes.     Needle aspiration has unacceptably high recurrence rates exceeding 50%, and for that reason I do not recommend this alternative. However, in pateints who are symptomatic and surgical excision is not an option, aspiration can be considered.     Open surgical excision provides the most reliable results, with 5-10% recurrence rates. This is typically performed under regional anesthesia, and requires 10-14 days of splinting followed by possible need for therapy. The risks and potential complications were reviewed which include, but are not limited to, bleeding, infection injury to blood vessels/tendons/nerve, pain, stiffness, and need for further surgery.     After a thorough discussion of these options the patient wishes to proceed with excision.  An ultrasound of the right wrist was ordered today to better evaluate structures and source. Plan for return following for pre-op discussion.    Recommended lidoderm patch for pain control at wrist, Tylenol. Ice as needed. Cause use splint as tolerated.        The patient verbalized understanding of exam findings and treatment plan. We engaged in the shared decision-making process and treatment  options were discussed at length with the patient. Surgical and conservative management discussed today along with risks and benefits.    Follow Up:  After US of right wrist      General Discussions:  Ganglion Cysts: The anatomy and physiology of the ganglion was discussed with the patient today in the office.  Fluid leaking out of the joint surface typically creates a small sac, which can enlarge and cause pain or limitation of motion.  Treatment options include observation, aspiration, or surgical incision were discussed with the patient today.  Observation typically lead to resolution and approximately 10% of patients, aspiration results in resolution of approximately 50% of patients, and surgical excision leads to resolution in approximately 97% of patients.  After discussion with the patient today, the patient voiced understanding of all treatment options.    Operative Discussions:  Ganglion Cyst Excision: The anatomy and physiology of the ganglion was discussed with the patient today in the office.  Fluid leaking out of the joint surface typically creates a small sac, which can enlarge and cause pain or limitation of motion.  Treatment options include observation, aspiration, or surgical incision were discussed with the patient today.  Observation typically lead to resolution and approximately 10% of patients, aspiration least resolution approximately 50% of patients, and surgical excision lead to resolution in approximately 97% of patients.  After discussion with the patient today, the patient voiced understanding of all treatment options.The patient has elected excision of the ganglion.The risks and benefits of the procedure were explained to the patient, which include, but are not limited to: Bleeding, infection, recurrence, pain, scar, damage to tendons, damage to nerves, and damage to blood vessels, failure to give desired results and complications related to anesthesia.  These risks, along with  alternative conservative treatment options, and postoperative protocols were voiced back and understood by the patient.  All questions were answered to the patient's satisfaction.  The patient agrees to comply with a standard postoperative protocol, and is willing to proceed.  Education was provided via written and auditory forms.  There were no barriers to learning. Written handouts regarding wound care, incision and scar care, and general preoperative information was provided to the patient.  Prior to surgery, the patient may be requested to stop all anti-inflammatory medications.  Prophylactic aspirin, Plavix, and Coumadin may be allowed to be continued.  Medications including vitamin E., ginkgo, and fish oil are requested to be stopped approximately one week prior to surgery.  Hypertensive medications and beta blockers, if taken, should be continued.    ____________________________________________________________________________________________________________________________________________      CHIEF COMPLAINT:  Right wrist mass    SUBJECTIVE:  Dannie Mcgarry is a 49 y.o. female who presents with a mass of the right wrist. She was seen in the ED on 11/23/2024, where X-rays were obtained. She was placed in a wrist immobilizer by the ED. She states this mass has been present for a few years; however, she feels it has grown in size over the past few days. She reports increased pain of the mass.  Her right hand has been swollen for the past few days. She is experiencing a flare of her lupus currently.  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 Rosa    Anxiety     Arthritis     hands    Diabetes mellitus (HCC)     Disease of thyroid gland     hypo    HPV (human papilloma virus) infection 2015    See medical records from Dr. Rosa    HTN (hypertension)     Hyperlipidemia     Hypertension     Irregular heart beat     interventricular. conduction relay disorder    Nausea 2015    since gastric sleeve    Ovarian cyst     Urinary tract infection     chronic issue    Varicella 1985       PAST SURGICAL HISTORY:  Past Surgical History:   Procedure Laterality Date    BARIATRIC SURGERY  Dec 2015    Gastric Sleeve    CERVICAL BIOPSY N/A 3/2/2017    Procedure: BIOPSY LEEP CERVIX;  Surgeon: Lon Rosa MD;  Location: [facility] MAIN OR;  Service:     CERVICAL BIOPSY  W/ LOOP ELECTRODE EXCISION  Mar 2017    See medical records from Dr. Rosa    ESOPHAGOGASTRODUODENOSCOPY      GASTRIC RESTRICTION SURGERY  2015    sleeve at age 40    VT EXC TUMOR SOFT TISS FACE&SCALP SUBFASCIAL 2 CM/> N/A 1/3/2024    Procedure: EXCISION OF FOREHEAD LIPOMA WITH COMPLEX CLOSURE;  Surgeon: Luz Diallo MD;  Location: [facility] MAIN OR;  Service: Plastics    TONSILLECTOMY      adenoids-age 30    WISDOM TOOTH EXTRACTION      x4-age 18       FAMILY HISTORY:  Family History   Problem Relation Age of Onset    Diabetes Mother         diet controlled    Diabetes Father         insulin    Other Father         agent orange       SOCIAL HISTORY:  Social History     Tobacco Use    Smoking status: Former     Current packs/day: 0.00     Average packs/day: 0.3 packs/day for 10.0 years (2.5 ttl pk-yrs)     Types: Cigarettes     Start date: 1994     Quit date:      Years since quittin.8     Passive exposure: Past    Smokeless tobacco: Never   Vaping Use    Vaping status: Never Used   Substance Use Topics    Alcohol use: Yes     Alcohol/week: 6.0  standard drinks of alcohol     Types: 6 Standard drinks or equivalent per week     Comment: 3-4 weekly Vodka    Drug use: Yes     Frequency: 7.0 times per week     Types: Marijuana     Comment: occasionally       MEDICATIONS:    Current Outpatient Medications:     acetaminophen-codeine (TYLENOL with CODEINE #3) 300-30 MG per tablet, Take 1 tablet by mouth every 6 (six) hours as needed, Disp: , Rfl:     amLODIPine (NORVASC) 10 mg tablet, Take 1 tablet (10 mg total) by mouth daily, Disp: 90 tablet, Rfl: 3    atorvastatin (LIPITOR) 10 mg tablet, Take 1 tablet (10 mg total) by mouth daily, Disp: 90 tablet, Rfl: 3    Cetirizine HCl (ZYRTEC ALLERGY PO), Take 20 mg by mouth every morning, Disp: , Rfl:     Diclofenac Sodium (VOLTAREN) 1 %, Apply 2 g topically 4 (four) times a day To the right wrist x 7 days, Disp: 240 g, Rfl: 0    glipiZIDE (GLUCOTROL XL) 5 mg 24 hr tablet, Take 1 tablet (5 mg total) by mouth daily with breakfast, Disp: 90 tablet, Rfl: 3    ibuprofen (MOTRIN) 800 mg tablet, Take 800 mg by mouth every 8 (eight) hours as needed, Disp: , Rfl:     Jardiance 10 MG TABS tablet, Take 1 tablet (10 mg total) by mouth daily, Disp: 90 tablet, Rfl: 3    levothyroxine 112 mcg tablet, Take 1 tablet (112 mcg total) by mouth every morning, Disp: 30 tablet, Rfl: 5    Mounjaro 5 MG/0.5ML, Inject 0.5 mL (5 mg total) under the skin every 7 days, Disp: 2 mL, Rfl: 3    PARoxetine (PAXIL) 30 mg tablet, Take 1 tablet (30 mg total) by mouth every morning, Disp: 90 tablet, Rfl: 1    BIOTIN PO, Take 30,000 mcg by mouth every morning (Patient not taking: Reported on 11/26/2024), Disp: , Rfl:     ALLERGIES:  Allergies   Allergen Reactions    [place] (Diagn[facility]) - Food Allergy Anaphylaxis     All stone nuts. Lips/tongue swell-Birch Tree allergy    Bee Venom Anaphylaxis     All over swelling/pain/redness    Soy Allergy - Food Allergy Anaphylaxis, Hives, Itching, Palpitations and Swelling    Codeine Nausea Only           REVIEW OF  SYSTEMS:  Pertinent items are noted in HPI.  A comprehensive review of systems was negative.    VITALS:  Vitals:    11/26/24 0908   BP: 126/87   Pulse: 64       LABS:  HgA1c:   Lab Results   Component Value Date    HGBA1C 5.6 09/17/2024     BMP:   Lab Results   Component Value Date    GLUCOSE 114 (H) 12/29/2017    CALCIUM 8.0 04/15/2018     12/29/2017    K 4.2 09/17/2024    CO2 27 09/17/2024     09/17/2024    BUN 11 09/17/2024    CREATININE 0.71 09/17/2024       _____________________________________________________  PHYSICAL EXAMINATION:  General: well developed and well nourished, alert, oriented times 3, and appears comfortable  Psychiatric: Normal  HEENT: Normocephalic, Atraumatic Trachea Midline, No torticollis  Pulmonary: No audible wheezing or respiratory distress   Abdomen/GI: Non tender, non distended   Cardiovascular: No pitting edema, 2+ radial pulse   Skin: Mass of right wrist, No Erythema, No Lacerations, No Fluctuation, No Ulcerations  Neurovascular: Sensation Intact to the Median, Ulnar, Radial Nerve, Motor Intact to the Median, Ulnar, Radial Nerve, and Pulses Intact  Musculoskeletal: Normal, except as noted in detailed exam and in HPI.        FOCUSED MUSCULOSKELETAL EXAMINATION:  Right Upper Extremity  Inspection: skin intact, mass about volar radial wrist, no erythema  Palpation: volar ganglion of radial right wrist, ttp, well circumscribed mobile painful mass roughly 2cm at volar radial wrist  Neurologic: 5/5 elbow flexion, 5/5 elbow extension, 5/5 wrist extension, 5/5 wrist flexion, 5/5 finger flexion, 5/5 finger extension, 5/5 FPL, 5/5 EPL, 5/5 APB, 5/5 intrinsics, sensation intact to median, radial, and ulnar nerve distributions  Vascular: Palpable radial pulse, brisk cap refill <2sec, hand warm and well perfused  MSK:   RIGHT SIDE:  Wrist:  Full ROM, No Instability, and Positive Tenderness volar radial ganglion  ___________________________________________________  STUDIES  REVIEWED:  Xrays of the right wrist were obtained on 11/23/2024 in the ED were independently reviewed which demonstrates no acute osseous abnormalities.      LABS REVIEWED:    HgA1c:   Lab Results   Component Value Date    HGBA1C 5.6 09/17/2024     BMP:   Lab Results   Component Value Date    GLUCOSE 114 (H) 12/29/2017    CALCIUM 8.0 04/15/2018     12/29/2017    K 4.2 09/17/2024    CO2 27 09/17/2024     09/17/2024    BUN 11 09/17/2024    CREATININE 0.71 09/17/2024               PROCEDURES PERFORMED:  No Procedures performed today    _____________________________________________________      Scribe Attestation      I,:  Kylah Coleman am acting as a scribe while in the presence of the attending physician.:       I,:  Faisal Garcia MD personally performed the services described in this documentation    as scribed in my presence.:               I agree with the history, physical examination, assessment and plan of care as documented above.    Faisal Garcia M.D.  Attending, Orthopaedic Surgery  Hand, Wrist, and Elbow Surgery  [facility]

## 2024-11-27 ENCOUNTER — PATIENT MESSAGE (OUTPATIENT)
Dept: OBGYN CLINIC | Facility: CLINIC | Age: 49
End: 2024-11-27

## 2024-11-27 ENCOUNTER — HOSPITAL ENCOUNTER (OUTPATIENT)
Dept: RADIOLOGY | Facility: HOSPITAL | Age: 49
Discharge: HOME | End: 2024-11-27
Attending: STUDENT IN AN ORGANIZED HEALTH CARE EDUCATION/TRAINING PROGRAM
Payer: COMMERCIAL

## 2024-11-27 ENCOUNTER — TELEPHONE (OUTPATIENT)
Dept: OBGYN CLINIC | Facility: CLINIC | Age: 49
End: 2024-11-27

## 2024-11-27 DIAGNOSIS — M67.431 GANGLION CYST OF VOLAR ASPECT OF RIGHT WRIST: ICD-10-CM

## 2024-11-27 PROCEDURE — 76882 US LMTD JT/FCL EVL NVASC XTR: CPT

## 2024-11-27 NOTE — TELEPHONE ENCOUNTER
Patient states she will lose her job if we do not use 11/23 since that was her injury date, she states she can not work, her whole job involves her hand. She is unable to work, she states she mentioned this at her appointment

## 2024-11-27 NOTE — TELEPHONE ENCOUNTER
Pt submitted disability for sx for ganglion cyst stating first date of disability was 11/23. I do not see any letters placed regarding out of work. Nor do I see any surgery . Last ovn was to fu after US ... Please advise what you would like me to tell patient

## 2024-11-29 ENCOUNTER — OFFICE VISIT (OUTPATIENT)
Dept: OBGYN CLINIC | Facility: CLINIC | Age: 49
End: 2024-11-29
Payer: COMMERCIAL

## 2024-11-29 VITALS — WEIGHT: 193 LBS | BODY MASS INDEX: 32.95 KG/M2 | HEIGHT: 64 IN

## 2024-11-29 DIAGNOSIS — M67.40 GANGLION CYST: Primary | ICD-10-CM

## 2024-11-29 PROCEDURE — 99214 OFFICE O/P EST MOD 30 MIN: CPT | Performed by: STUDENT IN AN ORGANIZED HEALTH CARE EDUCATION/TRAINING PROGRAM

## 2024-11-29 RX ORDER — CEFAZOLIN SODIUM 2 G/50ML
2000 SOLUTION INTRAVENOUS ONCE
OUTPATIENT
Start: 2024-11-29 | End: 2024-11-29

## 2024-11-29 RX ORDER — CHLORHEXIDINE GLUCONATE ORAL RINSE 1.2 MG/ML
15 SOLUTION DENTAL ONCE
OUTPATIENT
Start: 2024-11-29 | End: 2024-11-29

## 2024-11-29 NOTE — TELEPHONE ENCOUNTER
Pt was scheduled for surgery on 12/12.  A letter was provided to her today.  Pt asks that FMLA be completed and sent via My chart.  Please call pt when paperwork is complete.

## 2024-11-29 NOTE — PROGRESS NOTES
ORTHOPAEDIC HAND, WRIST, AND ELBOW OFFICE  VISIT     ASSESSMENT/PLAN:    Dannie Mcgarry is a 49 y.o. RHD female who presents with volar ganglion of right wrist    PLAN: The natural history of this condition and treatment options were discussed in detail. Treatment options include observation only with activity modifications, needle aspiration, and open surgical excision. The patient was informed that certain diagnosis can only be determined by histologic evaluation, but that this lesion appears to be benign in nature by clinical appearance. Given location we do recommend an U/S to evaluate.     Observation and expectant management a viable option. This would require observation for significant change in mass characteristic such as increasing size, or associated skin changes.     Needle aspiration has unacceptably high recurrence rates exceeding 50%, and for that reason I do not recommend this alternative. However, in pateints who are symptomatic and surgical excision is not an option, aspiration can be considered.     Open surgical excision provides the most reliable results, with 5-10% recurrence rates. This is typically performed under regional anesthesia, and requires 10-14 days of splinting followed by possible need for therapy. The risks and potential complications were reviewed which include, but are not limited to, bleeding, infection injury to blood vessels/tendons/nerve, pain, stiffness, and need for further surgery.     After a thorough discussion of these options the patient wishes to proceed with excision. Informed consent obtained.     Recommended lidoderm patch for pain control at wrist, Tylenol. Ice as needed. Cause use splint as tolerated pending surgery.        The patient verbalized understanding of exam findings and treatment plan. We engaged in the shared decision-making process and treatment options were discussed at length with the patient. Surgical and conservative management discussed today  along with risks and benefits.    Follow Up:  Post-op      General Discussions:  Ganglion Cysts: The anatomy and physiology of the ganglion was discussed with the patient today in the office.  Fluid leaking out of the joint surface typically creates a small sac, which can enlarge and cause pain or limitation of motion.  Treatment options include observation, aspiration, or surgical incision were discussed with the patient today.  Observation typically lead to resolution and approximately 10% of patients, aspiration results in resolution of approximately 50% of patients, and surgical excision leads to resolution in approximately 97% of patients.  After discussion with the patient today, the patient voiced understanding of all treatment options.    Operative Discussions:  Ganglion Cyst Excision: The anatomy and physiology of the ganglion was discussed with the patient today in the office.  Fluid leaking out of the joint surface typically creates a small sac, which can enlarge and cause pain or limitation of motion.  Treatment options include observation, aspiration, or surgical incision were discussed with the patient today.  Observation typically lead to resolution and approximately 10% of patients, aspiration least resolution approximately 50% of patients, and surgical excision lead to resolution in approximately 97% of patients.  After discussion with the patient today, the patient voiced understanding of all treatment options.The patient has elected excision of the ganglion.The risks and benefits of the procedure were explained to the patient, which include, but are not limited to: Bleeding, infection, recurrence, pain, scar, damage to tendons, damage to nerves, and damage to blood vessels, failure to give desired results and complications related to anesthesia.  These risks, along with alternative conservative treatment options, and postoperative protocols were voiced back and understood by the patient.  All  questions were answered to the patient's satisfaction.  The patient agrees to comply with a standard postoperative protocol, and is willing to proceed.  Education was provided via written and auditory forms.  There were no barriers to learning. Written handouts regarding wound care, incision and scar care, and general preoperative information was provided to the patient.  Prior to surgery, the patient may be requested to stop all anti-inflammatory medications.  Prophylactic aspirin, Plavix, and Coumadin may be allowed to be continued.  Medications including vitamin E., ginkgo, and fish oil are requested to be stopped approximately one week prior to surgery.  Hypertensive medications and beta blockers, if taken, should be continued.    ____________________________________________________________________________________________________________________________________________      CHIEF COMPLAINT:  Right wrist mass    SUBJECTIVE:  Dannie Mcgarry is a 49 y.o. female who presents with a mass of the right wrist. She was seen in the ED on 11/23/2024, where X-rays were obtained. She was placed in a wrist immobilizer by the ED. She states this mass has been present for a few years; however, she feels it has grown in size over the past few days. She reports increased pain of the mass.  Her right hand has been swollen for the past few days. She is experiencing a flare of her lupus currently. She was sent for U/S of wrist at last visit and presents today to review results.  Radiation: Yes to the  forearm  Previous Treatments: NSAIDs, Tylenol, activity modification, and bracing with only partial relief  Associated symptoms: Numbness of right thumb, index, and long fingers  Handedness: right  Work status: strawberry harvester    I have personally reviewed all the relevant PMH, PSH, SH, FH, Medications and allergies      PAST MEDICAL HISTORY:  Past Medical History:   Diagnosis Date    Abnormal ECG 12/2015    Interventricular  conduction relay disorder    Abnormal Pap smear of cervix 2015    See medical records from Dr. Rosa    Anxiety     Arthritis     hands    Diabetes mellitus (HCC)     Disease of thyroid gland     hypo    HPV (human papilloma virus) infection 2015    See medical records from Dr. Rosa    HTN (hypertension)     Hyperlipidemia     Hypertension     Irregular heart beat     interventricular. conduction relay disorder    Nausea 2015    since gastric sleeve    Ovarian cyst     Urinary tract infection     chronic issue    Varicella 1985       PAST SURGICAL HISTORY:  Past Surgical History:   Procedure Laterality Date    BARIATRIC SURGERY  Dec 2015    Gastric Sleeve    CERVICAL BIOPSY N/A 3/2/2017    Procedure: BIOPSY LEEP CERVIX;  Surgeon: Lon Rosa MD;  Location: [facility] MAIN OR;  Service:     CERVICAL BIOPSY  W/ LOOP ELECTRODE EXCISION  Mar 2017    See medical records from Dr. Rosa    ESOPHAGOGASTRODUODENOSCOPY      GASTRIC RESTRICTION SURGERY  2015    sleeve at age 40    CT EXC TUMOR SOFT TISS FACE&SCALP SUBFASCIAL 2 CM/> N/A 1/3/2024    Procedure: EXCISION OF FOREHEAD LIPOMA WITH COMPLEX CLOSURE;  Surgeon: Luz Diallo MD;  [facility]: [facility] MAIN OR;  Service: Plastics    TONSILLECTOMY      adenoids-age 30    WISDOM TOOTH EXTRACTION      x4-age 18       FAMILY HISTORY:  Family History   Problem Relation Age of Onset    Diabetes Mother         diet controlled    Diabetes Father         insulin    Other Father         agent orange       SOCIAL HISTORY:  Social History     Tobacco Use    Smoking status: Former     Current packs/day: 0.00     Average packs/day: 0.3 packs/day for 10.0 years (2.5 ttl pk-yrs)     Types: Cigarettes     Start date: 1994     Quit date:      Years since quittin.8     Passive exposure: Past    Smokeless tobacco: Never   Vaping Use    Vaping status: Never Used   Substance Use Topics    Alcohol use: Yes     Alcohol/week: 6.0 standard drinks of alcohol     Types: 6  Standard drinks or equivalent per week     Comment: 3-4 weekly Vodka    Drug use: Yes     Frequency: 7.0 times per week     Types: Marijuana     Comment: occasionally       MEDICATIONS:    Current Outpatient Medications:     acetaminophen-codeine (TYLENOL with CODEINE #3) 300-30 MG per tablet, Take 1 tablet by mouth every 6 (six) hours as needed, Disp: , Rfl:     amLODIPine (NORVASC) 10 mg tablet, Take 1 tablet (10 mg total) by mouth daily, Disp: 90 tablet, Rfl: 3    atorvastatin (LIPITOR) 10 mg tablet, Take 1 tablet (10 mg total) by mouth daily, Disp: 90 tablet, Rfl: 3    Cetirizine HCl (ZYRTEC ALLERGY PO), Take 20 mg by mouth every morning, Disp: , Rfl:     Diclofenac Sodium (VOLTAREN) 1 %, Apply 2 g topically 4 (four) times a day To the right wrist x 7 days, Disp: 240 g, Rfl: 0    glipiZIDE (GLUCOTROL XL) 5 mg 24 hr tablet, Take 1 tablet (5 mg total) by mouth daily with breakfast, Disp: 90 tablet, Rfl: 3    ibuprofen (MOTRIN) 800 mg tablet, Take 800 mg by mouth every 8 (eight) hours as needed, Disp: , Rfl:     Jardiance 10 MG TABS tablet, Take 1 tablet (10 mg total) by mouth daily, Disp: 90 tablet, Rfl: 3    levothyroxine 112 mcg tablet, Take 1 tablet (112 mcg total) by mouth every morning, Disp: 30 tablet, Rfl: 5    lidocaine (Lidoderm) 5 %, Apply 1 patch topically over 12 hours daily Remove & Discard patch within 12 hours or as directed by MD, Disp: 30 patch, Rfl: 0    Mounjaro 5 MG/0.5ML SOAJ, INJECT 0.5 ML (5 MG TOTAL) UNDER THE SKIN EVERY 7 DAYS, Disp: , Rfl:     Mounjaro 5 MG/0.5ML, Inject 0.5 mL (5 mg total) under the skin every 7 days, Disp: 2 mL, Rfl: 3    PARoxetine (PAXIL) 30 mg tablet, Take 1 tablet (30 mg total) by mouth every morning, Disp: 90 tablet, Rfl: 1    BIOTIN PO, Take 30,000 mcg by mouth every morning (Patient not taking: Reported on 11/26/2024), Disp: , Rfl:     ALLERGIES:  Allergies   Allergen Reactions    [place] (Diagnostic) - Food Allergy Anaphylaxis     All stone nuts. Lips/tongue  swell-Basom allergy    Bee Venom Anaphylaxis     All over swelling/pain/redness    Soy Allergy - Food Allergy Anaphylaxis, Hives, Itching, Palpitations and Swelling    Codeine Nausea Only           REVIEW OF SYSTEMS:  Pertinent items are noted in HPI.  A comprehensive review of systems was negative.    VITALS:  There were no vitals filed for this visit.      LABS:  HgA1c:   Lab Results   Component Value Date    HGBA1C 5.6 09/17/2024     BMP:   Lab Results   Component Value Date    GLUCOSE 114 (H) 12/29/2017    CALCIUM 8.0 04/15/2018     12/29/2017    K 4.2 09/17/2024    CO2 27 09/17/2024     09/17/2024    BUN 11 09/17/2024    CREATININE 0.71 09/17/2024       _____________________________________________________  PHYSICAL EXAMINATION:  General: well developed and well nourished, alert, oriented times 3, and appears comfortable  Psychiatric: Normal  HEENT: Normocephalic, Atraumatic Trachea Midline, No torticollis  Pulmonary: No audible wheezing or respiratory distress   Abdomen/GI: Non tender, non distended   Cardiovascular: Regular rate and rhythm, No pitting edema, 2+ radial pulse   Skin: Mass of right wrist, No Erythema, No Lacerations, No Fluctuation, No Ulcerations  Neurovascular: Sensation Intact to the Median, Ulnar, Radial Nerve, Motor Intact to the Median, Ulnar, Radial Nerve, and Pulses Intact  Musculoskeletal: Normal, except as noted in detailed exam and in HPI.        FOCUSED MUSCULOSKELETAL EXAMINATION:  Right Upper Extremity  Inspection: skin intact, mass about volar radial wrist, no erythema  Palpation: volar ganglion of radial right wrist, ttp, well circumscribed mobile painful mass roughly 2cm at volar radial wrist  Neurologic: 5/5 elbow flexion, 5/5 elbow extension, 5/5 wrist extension, 5/5 wrist flexion, 5/5 finger flexion, 5/5 finger extension, 5/5 FPL, 5/5 EPL, 5/5 APB, 5/5 intrinsics, sensation intact to median, radial, and ulnar nerve distributions  Vascular: Palpable radial  pulse, brisk cap refill <2sec, hand warm and well perfused  MSK:   RIGHT SIDE:  Wrist:  Full ROM, No Instability, and Positive Tenderness volar radial ganglion  ___________________________________________________  STUDIES REVIEWED:  Xrays of the right wrist were obtained on 11/23/2024 in the ED were independently reviewed which demonstrates no acute osseous abnormalities.    U/S of right wrist obtained on 11/27/24 was independently reviewed which demonstrate complex volar cyst with clear stalk extending from radiocarpal joint.    LABS REVIEWED:    HgA1c:   Lab Results   Component Value Date    HGBA1C 5.6 09/17/2024     BMP:   Lab Results   Component Value Date    GLUCOSE 114 (H) 12/29/2017    CALCIUM 8.0 04/15/2018     12/29/2017    K 4.2 09/17/2024    CO2 27 09/17/2024     09/17/2024    BUN 11 09/17/2024    CREATININE 0.71 09/17/2024               PROCEDURES PERFORMED:  No Procedures performed today    _____________________________________________________      I agree with the history, physical examination, assessment and plan of care as documented above.    Faisal Garcia M.D.  Attending, Orthopaedic Surgery  Hand, Wrist, and Elbow Surgery  [facility]

## 2024-11-29 NOTE — LETTER
November 29, 2024     Patient: Dannie Mcgarry  YOB: 1975  Date of Visit: 11/29/2024      To Whom it May Concern:    Dannie Mcgarry is under my professional care. Dannie was seen in my office on 11/29/2024. Dannie has been under my care since her injury from 11/23/2024. She has significant injury to right wrist that requires surgery. Please excuse patient from work from date of injury until at least 6 weeks following surgery. We have scheduled patient for surgery today and I will provide updated work status note at her first post-operative visit. I expect patient to be out of work until at least 1/10/2024.    If you have any questions or concerns, please don't hesitate to call.         Sincerely,          Faisal Garcia MD        CC: No Recipients

## 2024-12-06 NOTE — PRE-PROCEDURE INSTRUCTIONS
Pre-Surgery Instructions:   Medication Instructions    acetaminophen-codeine (TYLENOL with CODEINE #3) 300-30 MG per tablet Uses PRN- OK to take day of surgery    amLODIPine (NORVASC) 10 mg tablet Take day of surgery.    atorvastatin (LIPITOR) 10 mg tablet Take day of surgery.    Cetirizine HCl (ZYRTEC ALLERGY PO) Hold day of surgery.    glipiZIDE (GLUCOTROL XL) 5 mg 24 hr tablet Hold day of surgery.    Jardiance 10 MG TABS tablet Stop taking 4 days prior to surgery.    levothyroxine 112 mcg tablet Take day of surgery.    Mounjaro 5 MG/0.5ML Stop taking 7 days prior to surgery.    PARoxetine (PAXIL) 30 mg tablet Take day of surgery.    Medication instructions for day surgery reviewed. Please use only a sip of water to take your instructed medications. Avoid all over the counter vitamins, supplements and NSAIDS for one week prior to surgery per anesthesia guidelines. Tylenol is ok to take as needed.     You will receive a call one business day prior to surgery with an arrival time and hospital directions. If your surgery is scheduled on a Monday, the hospital will be calling you on the Friday prior to your surgery. If you have not heard from anyone by 8pm, please call the hospital supervisor through the hospital  at [phone]. ([place] [phone] or [place] [phone]).    Do not eat or drink anything after midnight the night before your surgery, including candy, mints, lifesavers, or chewing gum. Do not drink alcohol 24hrs before your surgery. Try not to smoke at least 24hrs before your surgery.       Follow the pre surgery showering instructions as listed in the “My Surgical Experience Booklet” or otherwise provided by your surgeon's office. Do not use a blade to shave the surgical area 1 week before surgery. It is okay to use a clean electric clippers up to 24 hours before surgery. Do not apply any lotions, creams, including makeup, cologne, deodorant, or perfumes after showering on the day of  your surgery. Do not use dry shampoo, hair spray, hair gel, or any type of hair products.     No contact lenses, eye make-up, or artificial eyelashes. Remove nail polish, including gel polish, and any artificial, gel, or acrylic nails if possible. Remove all jewelry including rings and body piercing jewelry.     Wear causal clothing that is easy to take on and off. Consider your type of surgery.    Keep any valuables, jewelry, piercings at home. Please bring any specially ordered equipment (sling, braces) if indicated.    Arrange for a responsible person to drive you to and from the hospital on the day of your surgery. Please confirm the visitor policy for the day of your procedure when you receive your phone call with an arrival time.     Call the surgeon's office with any new illnesses, exposures, or additional questions prior to surgery.    Please reference your “My Surgical Experience Booklet” for additional information to prepare for your upcoming surgery.

## 2024-12-11 ENCOUNTER — ANESTHESIA EVENT (OUTPATIENT)
Dept: PERIOP | Facility: HOSPITAL | Age: 49
End: 2024-12-11
Payer: COMMERCIAL

## 2024-12-11 DIAGNOSIS — M67.431 GANGLION CYST OF VOLAR ASPECT OF RIGHT WRIST: Primary | ICD-10-CM

## 2024-12-11 RX ORDER — SENNOSIDES 8.6 MG
650 CAPSULE ORAL EVERY 8 HOURS
Qty: 30 TABLET | Refills: 0 | Status: SHIPPED | OUTPATIENT
Start: 2024-12-11 | End: 2024-12-21

## 2024-12-11 RX ORDER — NAPROXEN 500 MG/1
500 TABLET ORAL 2 TIMES DAILY WITH MEALS
Qty: 20 TABLET | Refills: 0 | Status: SHIPPED | OUTPATIENT
Start: 2024-12-11 | End: 2024-12-21

## 2024-12-11 RX ORDER — OXYCODONE AND ACETAMINOPHEN 5; 325 MG/1; MG/1
1 TABLET ORAL EVERY 6 HOURS PRN
Qty: 5 TABLET | Refills: 0 | Status: SHIPPED | OUTPATIENT
Start: 2024-12-11

## 2024-12-11 RX ORDER — ONDANSETRON 4 MG/1
4 TABLET, FILM COATED ORAL EVERY 8 HOURS PRN
Qty: 4 TABLET | Refills: 0 | Status: SHIPPED | OUTPATIENT
Start: 2024-12-11

## 2024-12-12 ENCOUNTER — ANESTHESIA (OUTPATIENT)
Dept: PERIOP | Facility: HOSPITAL | Age: 49
End: 2024-12-12
Payer: COMMERCIAL

## 2024-12-12 ENCOUNTER — HOSPITAL ENCOUNTER (OUTPATIENT)
Facility: HOSPITAL | Age: 49
Setting detail: OUTPATIENT SURGERY
Discharge: HOME | End: 2024-12-12
Attending: STUDENT IN AN ORGANIZED HEALTH CARE EDUCATION/TRAINING PROGRAM | Admitting: STUDENT IN AN ORGANIZED HEALTH CARE EDUCATION/TRAINING PROGRAM
Payer: COMMERCIAL

## 2024-12-12 ENCOUNTER — TELEPHONE (OUTPATIENT)
Age: 49
End: 2024-12-12

## 2024-12-12 VITALS
SYSTOLIC BLOOD PRESSURE: 149 MMHG | BODY MASS INDEX: 31.99 KG/M2 | RESPIRATION RATE: 18 BRPM | TEMPERATURE: 97.2 F | HEIGHT: 64 IN | WEIGHT: 187.39 LBS | DIASTOLIC BLOOD PRESSURE: 93 MMHG | HEART RATE: 78 BPM | OXYGEN SATURATION: 100 %

## 2024-12-12 DIAGNOSIS — M67.40 GANGLION CYST: ICD-10-CM

## 2024-12-12 LAB — GLUCOSE SERPL-MCNC: 144 MG/DL (ref 65–140)

## 2024-12-12 PROCEDURE — NC001 PR NO CHARGE: Performed by: STUDENT IN AN ORGANIZED HEALTH CARE EDUCATION/TRAINING PROGRAM

## 2024-12-12 PROCEDURE — 82948 REAGENT STRIP/BLOOD GLUCOSE: CPT

## 2024-12-12 PROCEDURE — 25112 REREMOVE WRIST TENDON LESION: CPT | Performed by: PHYSICIAN ASSISTANT

## 2024-12-12 PROCEDURE — 25112 REREMOVE WRIST TENDON LESION: CPT | Performed by: STUDENT IN AN ORGANIZED HEALTH CARE EDUCATION/TRAINING PROGRAM

## 2024-12-12 PROCEDURE — 88304 TISSUE EXAM BY PATHOLOGIST: CPT | Performed by: PATHOLOGY

## 2024-12-12 RX ORDER — PROPOFOL 10 MG/ML
INJECTION, EMULSION INTRAVENOUS CONTINUOUS PRN
Status: DISCONTINUED | OUTPATIENT
Start: 2024-12-12 | End: 2024-12-12

## 2024-12-12 RX ORDER — SODIUM CHLORIDE, SODIUM LACTATE, POTASSIUM CHLORIDE, CALCIUM CHLORIDE 600; 310; 30; 20 MG/100ML; MG/100ML; MG/100ML; MG/100ML
125 INJECTION, SOLUTION INTRAVENOUS CONTINUOUS
Status: DISCONTINUED | OUTPATIENT
Start: 2024-12-12 | End: 2024-12-12

## 2024-12-12 RX ORDER — ONDANSETRON 2 MG/ML
4 INJECTION INTRAMUSCULAR; INTRAVENOUS ONCE AS NEEDED
Status: DISCONTINUED | OUTPATIENT
Start: 2024-12-12 | End: 2024-12-12 | Stop reason: HOSPADM

## 2024-12-12 RX ORDER — SODIUM CHLORIDE, SODIUM LACTATE, POTASSIUM CHLORIDE, CALCIUM CHLORIDE 600; 310; 30; 20 MG/100ML; MG/100ML; MG/100ML; MG/100ML
100 INJECTION, SOLUTION INTRAVENOUS CONTINUOUS
Status: DISCONTINUED | OUTPATIENT
Start: 2024-12-12 | End: 2024-12-12 | Stop reason: HOSPADM

## 2024-12-12 RX ORDER — ONDANSETRON 2 MG/ML
4 INJECTION INTRAMUSCULAR; INTRAVENOUS ONCE
Status: DISCONTINUED | OUTPATIENT
Start: 2024-12-12 | End: 2024-12-12 | Stop reason: HOSPADM

## 2024-12-12 RX ORDER — LIDOCAINE HYDROCHLORIDE 10 MG/ML
INJECTION, SOLUTION EPIDURAL; INFILTRATION; INTRACAUDAL; PERINEURAL AS NEEDED
Status: DISCONTINUED | OUTPATIENT
Start: 2024-12-12 | End: 2024-12-12

## 2024-12-12 RX ORDER — OXYCODONE HYDROCHLORIDE 5 MG/1
5 TABLET ORAL EVERY 6 HOURS PRN
Refills: 0 | Status: DISCONTINUED | OUTPATIENT
Start: 2024-12-12 | End: 2024-12-12 | Stop reason: HOSPADM

## 2024-12-12 RX ORDER — FENTANYL CITRATE 50 UG/ML
INJECTION, SOLUTION INTRAMUSCULAR; INTRAVENOUS AS NEEDED
Status: DISCONTINUED | OUTPATIENT
Start: 2024-12-12 | End: 2024-12-12

## 2024-12-12 RX ORDER — ROPIVACAINE HYDROCHLORIDE 5 MG/ML
INJECTION, SOLUTION EPIDURAL; INFILTRATION; PERINEURAL
Status: COMPLETED | OUTPATIENT
Start: 2024-12-12 | End: 2024-12-12

## 2024-12-12 RX ORDER — PROPOFOL 10 MG/ML
INJECTION, EMULSION INTRAVENOUS AS NEEDED
Status: DISCONTINUED | OUTPATIENT
Start: 2024-12-12 | End: 2024-12-12

## 2024-12-12 RX ORDER — ONDANSETRON 2 MG/ML
4 INJECTION INTRAMUSCULAR; INTRAVENOUS ONCE
Status: COMPLETED | OUTPATIENT
Start: 2024-12-12 | End: 2024-12-12

## 2024-12-12 RX ORDER — BUPIVACAINE HYDROCHLORIDE 2.5 MG/ML
INJECTION, SOLUTION EPIDURAL; INFILTRATION; INTRACAUDAL AS NEEDED
Status: DISCONTINUED | OUTPATIENT
Start: 2024-12-12 | End: 2024-12-12 | Stop reason: HOSPADM

## 2024-12-12 RX ORDER — FENTANYL CITRATE/PF 50 MCG/ML
50 SYRINGE (ML) INJECTION
Status: DISCONTINUED | OUTPATIENT
Start: 2024-12-12 | End: 2024-12-12 | Stop reason: HOSPADM

## 2024-12-12 RX ORDER — CEFAZOLIN SODIUM 2 G/50ML
SOLUTION INTRAVENOUS AS NEEDED
Status: DISCONTINUED | OUTPATIENT
Start: 2024-12-12 | End: 2024-12-12

## 2024-12-12 RX ORDER — SODIUM CHLORIDE, SODIUM LACTATE, POTASSIUM CHLORIDE, CALCIUM CHLORIDE 600; 310; 30; 20 MG/100ML; MG/100ML; MG/100ML; MG/100ML
INJECTION, SOLUTION INTRAVENOUS CONTINUOUS PRN
Status: DISCONTINUED | OUTPATIENT
Start: 2024-12-12 | End: 2024-12-12

## 2024-12-12 RX ORDER — MIDAZOLAM HYDROCHLORIDE 2 MG/2ML
INJECTION, SOLUTION INTRAMUSCULAR; INTRAVENOUS
Status: COMPLETED | OUTPATIENT
Start: 2024-12-12 | End: 2024-12-12

## 2024-12-12 RX ORDER — CEFAZOLIN SODIUM 2 G/50ML
2000 SOLUTION INTRAVENOUS ONCE
Status: DISCONTINUED | OUTPATIENT
Start: 2024-12-12 | End: 2024-12-12 | Stop reason: HOSPADM

## 2024-12-12 RX ORDER — MAGNESIUM HYDROXIDE 1200 MG/15ML
LIQUID ORAL AS NEEDED
Status: DISCONTINUED | OUTPATIENT
Start: 2024-12-12 | End: 2024-12-12 | Stop reason: HOSPADM

## 2024-12-12 RX ORDER — CHLORHEXIDINE GLUCONATE ORAL RINSE 1.2 MG/ML
15 SOLUTION DENTAL ONCE
Status: COMPLETED | OUTPATIENT
Start: 2024-12-12 | End: 2024-12-12

## 2024-12-12 RX ADMIN — PROPOFOL 50 MG: 10 INJECTION, EMULSION INTRAVENOUS at 10:27

## 2024-12-12 RX ADMIN — ONDANSETRON 4 MG: 2 INJECTION INTRAMUSCULAR; INTRAVENOUS at 08:10

## 2024-12-12 RX ADMIN — CEFAZOLIN SODIUM 2000 MG: 2 SOLUTION INTRAVENOUS at 10:19

## 2024-12-12 RX ADMIN — PROPOFOL 150 MCG/KG/MIN: 10 INJECTION, EMULSION INTRAVENOUS at 10:27

## 2024-12-12 RX ADMIN — CHLORHEXIDINE GLUCONATE 15 ML: 1.2 RINSE ORAL at 08:01

## 2024-12-12 RX ADMIN — FENTANYL CITRATE 50 MCG: 50 INJECTION, SOLUTION INTRAMUSCULAR; INTRAVENOUS at 10:41

## 2024-12-12 RX ADMIN — MIDAZOLAM 2 MG: 1 INJECTION INTRAMUSCULAR; INTRAVENOUS at 09:53

## 2024-12-12 RX ADMIN — PROPOFOL 50 MG: 10 INJECTION, EMULSION INTRAVENOUS at 10:25

## 2024-12-12 RX ADMIN — ROPIVACAINE HYDROCHLORIDE 20 ML: 5 INJECTION, SOLUTION EPIDURAL; INFILTRATION; PERINEURAL at 09:55

## 2024-12-12 RX ADMIN — SODIUM CHLORIDE, SODIUM LACTATE, POTASSIUM CHLORIDE, AND CALCIUM CHLORIDE: .6; .31; .03; .02 INJECTION, SOLUTION INTRAVENOUS at 10:17

## 2024-12-12 RX ADMIN — FENTANYL CITRATE 50 MCG: 50 INJECTION, SOLUTION INTRAMUSCULAR; INTRAVENOUS at 10:36

## 2024-12-12 RX ADMIN — LIDOCAINE HYDROCHLORIDE 50 MG: 10 INJECTION, SOLUTION EPIDURAL; INFILTRATION; INTRACAUDAL; PERINEURAL at 10:25

## 2024-12-12 RX ADMIN — PROPOFOL 100 MG: 10 INJECTION, EMULSION INTRAVENOUS at 10:26

## 2024-12-12 NOTE — DISCHARGE INSTR - AVS FIRST PAGE
Faisal Garcia MD  Post-operative Instructions  -Ganglion Cyst Excision-    Thank you for allowing me to participate in your care. It is a privilege to be able to take care of you. Should you have any questions about your post-operative care feel free to call my office at  #[phone] during business hours and either myself or a member of our team will address any questions or concerns you may have. If this is an urgent matter at night or on weekends please call [phone] and ask the page  to page the covering 1st call orthopaedic physician.   Prescription refills or changes cannot be addressed after normal business hours or on weekends.    General Post op Instructions  Please allow your body time to heal. No sports, heavy lifting, or vigorous physical activity until you return for follow-up.   Return to your normal diet as soon as you are able.  It is normal to have some swelling and bruising of the hand and fingers post operatively. This can be uncomfortable. Prevent excessive swelling by performing the following:  Elevate your hand above the level of your heart as much as possible during the first 12-24 hours post op.    Please prevent stiffness in your fingers by gently flexing them as much as you are able and fully extending them.   You may resume all of your home medications. If you are currently undergoing chemotherapy or treatment for a rheumatologic condition please be sure to discuss the continuation of these medications with me prior to leaving the hospital.   Driving:  You may not return to driving while you are taking narcotic pain medication. We strongly advise against return to driving if your procedure was performed on your Right side and your car has a manual transmission (“stick shift”).           Pain Control  Following the program below will greatly decrease your post-operative pain.   Medications have been sent to your pharmacy.  Aleve (naproxen) 500 mg and Tylenol Arthritis 650  mg on the afternoon/evening of surgery. Do NOT take Aleve if you have a history of gastric ulcers, uncontrolled reflux or have been told previously by a physician that you should not take anti-inflammatory medications such as Advil/Aleve/Motrin.    Aleve (naproxen) 500 mg in the morning and afternoon, for about 2-3 days after the surgery; even if you have no pain.  You can stop two days after surgery if your hand does not hurt.     Tylenol Arthritis (or any brand of acetaminophen 8-hour), 650 mg every eight hours, with a maximum dose of 3000 mg per day, for about 2-3 days after the surgery, even if you have no pain. Tylenol Arthritis plus Aleve work together as a team to make each other stronger.     The maximum amount of Tylenol is 3000 mg per day, which is the same as 4 of the 650 mg pills. Remember; don't substitute any other medication for the Tylenol: don't take Motrin, aspirin, or any other over the counter medication. It must be Tylenol (or any brand of acetaminophen) for it to work as a team. Remember as well that Tylenol Arthritis is taken every 8 hours, the Aleve is twice a day.     Percocet (Oxycodone/acetaminophen) 5/325 mg or a similar medication to assist with sleeping at night, and possibly every 6 hours during the day, ONLY IF NEEDED, for the first few days. You will be given a written prescription, but many patients find they do not need to take any or all of the Norco. Do not take the medication just because it was given to you; only take it if you need it.  Remember that Norco is an opioid pain medication and can lead to addiction, respiratory sedation, and death. In 2015 over 17,500 Americans  from opioid overdoses and I don't want this to happen to you. Opioid medications can also cause constipation, so please plan for that, as well as possible mental confusion and drowsiness.   (ondansetron or meclizine) has also been prescribed and is to help control any post-operative nausea you may be  experiencing.    You may have had a nerve block performed. This will last 8-24 hours. As the sensation in your hand returns you will begin to experience pain. Please take your pain medication prior to the return of full sensation in order to help control the pain in your arm.            Incision Care and Bathing  Please keep your dressing and splint in place until your follow-up appointment. If it feels excessively tight or your fingers are swollen despite elevation and movement you may loosen the compressive wrap, but do not dislodge the plaster splint underneath.   You may shower 24 hours after surgery. If you choose to shower please tape a waterproof plastic bag around your arm to keep your incision dry and clean.  No swimming or submerging your wound in water until sutures have been removed and the incision is completely healed.   After the sutures have been removed, gently massage the scar with a moisturizing cream such as Aquaphor twice a day to promote healing and prevent excessive scar formation.    Emergencies/When to Call  Please call the office if you have any questions or concerns regarding your post-operative care. We need to know if things are not going well.   Please make sure you call the office or page the 1st call orthopaedic physician immediately if you are having any of the following problems:  Fever greater than 101.0  Increasing pain or numbness not controlled on pain medications  Increasing bloody staining on the dressing   Chest pain, difficulty breathing, nausea or vomiting  Cold fingers that are not normal color (pink)   Any other concerning symptoms

## 2024-12-12 NOTE — PROGRESS NOTES
Patient received from PACU RN. Awake and oriented x4. Vitals WNL, denies any pain at this time. Family at bedside, refreshments provided. Stretcher low and locked, call bell within reach. Will continue to monitor. Nicolle Starr

## 2024-12-12 NOTE — ANESTHESIA POSTPROCEDURE EVALUATION
Post-Op Assessment Note    CV Status:  Stable  Pain Score: 0    Pain management: adequate    Multimodal analgesia used between 6 hours prior to anesthesia start to PACU discharge    Mental Status:  Alert   Hydration Status:  Stable   PONV Controlled:  None   Airway Patency:  Patent  Two or more mitigation strategies used for obstructive sleep apnea   Post Op Vitals Reviewed: Yes    No anethesia notable event occurred.    Staff: CRNA           Last Filed PACU Vitals:  Vitals Value Taken Time   Temp 98    Pulse 75    /92    Resp 16    SpO2 95        Modified Riaz:  No data recorded

## 2024-12-12 NOTE — TELEPHONE ENCOUNTER
PA for OXY-ACET 5-325 MG  APPROVED     Date(s) approved UNTIL 01/11/2025        Patient advised by          [x]MyChart Message  []Phone call   []LMOM  []L/M to call office as no active Communication consent on file  []Unable to leave detailed message as VM not approved on Communication consent       Pharmacy advised by    [x]Fax  []Phone call    Approval letter scanned into Media Yes

## 2024-12-12 NOTE — ANESTHESIA POSTPROCEDURE EVALUATION
Post-Op Assessment Note    CV Status:  Stable  Pain Score: 0    Pain management: adequate       Mental Status:  Alert and awake   Hydration Status:  Euvolemic and stable   PONV Controlled:  Controlled   Airway Patency:  Patent     Post Op Vitals Reviewed: Yes    No anethesia notable event occurred.    Staff: Anesthesiologist           Last Filed PACU Vitals:  Vitals Value Taken Time   Temp 97.2 °F (36.2 °C) 12/12/24 1056   Pulse 66 12/12/24 1157   /78 12/12/24 1157   Resp 20 12/12/24 1157   SpO2 94 % 12/12/24 1157       Modified Riaz:  Activity: 2 (12/12/2024 11:30 AM)  Respiration: 2 (12/12/2024 11:30 AM)  Circulation: 2 (12/12/2024 11:30 AM)  Consciousness: 1 (12/12/2024 11:30 AM)  Oxygen Saturation: 2 (12/12/2024 11:30 AM)  Modified Riaz Score: 9 (12/12/2024 11:30 AM)

## 2024-12-12 NOTE — ANESTHESIA PREPROCEDURE EVALUATION
Procedure:  EXCISION RIGHT VOLAR WRIST MASS (Right: Wrist)    Relevant Problems   ANESTHESIA (within normal limits)      CARDIO   (+) Essential hypertension   (+) Hyperlipemia, mixed   (+) Incomplete RBBB   (+) Intractable migraine      ENDO   (+) Hypothyroidism   (+) Type 2 diabetes mellitus without complication, without long-term current use of insulin (HCC)      NEURO/PSYCH   (+) Anxiety   (+) Intractable migraine      PULMONARY   (+) Sleep apnea        Physical Exam    Airway    Mallampati score: II  TM Distance: >3 FB  Neck ROM: full     Dental   No notable dental hx     Cardiovascular  Rhythm: regular, Rate: normal    Pulmonary   Breath sounds clear to auscultation    Other Findings  post-pubertal.      Anesthesia Plan  ASA Score- 3     Anesthesia Type- regional with ASA Monitors.         Additional Monitors:     Airway Plan:            Plan Factors-Exercise tolerance (METS): >4 METS.    Chart reviewed. EKG reviewed.  Existing labs reviewed. Patient summary reviewed.    Patient is a current smoker.  Patient did not smoke on day of surgery.            Induction- intravenous.    Postoperative Plan-         Informed Consent- Anesthetic plan and risks discussed with patient.  I personally reviewed this patient with the CRNA. Discussed and agreed on the Anesthesia Plan with the CRNA..

## 2024-12-12 NOTE — H&P
ORTHOPAEDIC HAND, WRIST, AND ELBOW OFFICE  VISIT     ASSESSMENT/PLAN:    Dannie Mcgarry is a 49 y.o. RHD female who presents with volar ganglion of right wrist    PLAN: The natural history of this condition and treatment options were discussed in detail. Treatment options include observation only with activity modifications, needle aspiration, and open surgical excision. The patient was informed that certain diagnosis can only be determined by histologic evaluation, but that this lesion appears to be benign in nature by clinical appearance. Given location we do recommend an U/S to evaluate.     Observation and expectant management a viable option. This would require observation for significant change in mass characteristic such as increasing size, or associated skin changes.     Needle aspiration has unacceptably high recurrence rates exceeding 50%, and for that reason I do not recommend this alternative. However, in pateints who are symptomatic and surgical excision is not an option, aspiration can be considered.     Open surgical excision provides the most reliable results, with 5-10% recurrence rates. This is typically performed under regional anesthesia, and requires 10-14 days of splinting followed by possible need for therapy. The risks and potential complications were reviewed which include, but are not limited to, bleeding, infection injury to blood vessels/tendons/nerve, pain, stiffness, and need for further surgery.     After a thorough discussion of these options the patient wishes to proceed with excision. Informed consent obtained.     Recommended lidoderm patch for pain control at wrist, Tylenol. Ice as needed. Cause use splint as tolerated pending surgery.        The patient verbalized understanding of exam findings and treatment plan. We engaged in the shared decision-making process and treatment options were discussed at length with the patient. Surgical and conservative management discussed today  along with risks and benefits.    Follow Up:  Post-op      General Discussions:  Ganglion Cysts: The anatomy and physiology of the ganglion was discussed with the patient today in the office.  Fluid leaking out of the joint surface typically creates a small sac, which can enlarge and cause pain or limitation of motion.  Treatment options include observation, aspiration, or surgical incision were discussed with the patient today.  Observation typically lead to resolution and approximately 10% of patients, aspiration results in resolution of approximately 50% of patients, and surgical excision leads to resolution in approximately 97% of patients.  After discussion with the patient today, the patient voiced understanding of all treatment options.    Operative Discussions:  Ganglion Cyst Excision: The anatomy and physiology of the ganglion was discussed with the patient today in the office.  Fluid leaking out of the joint surface typically creates a small sac, which can enlarge and cause pain or limitation of motion.  Treatment options include observation, aspiration, or surgical incision were discussed with the patient today.  Observation typically lead to resolution and approximately 10% of patients, aspiration least resolution approximately 50% of patients, and surgical excision lead to resolution in approximately 97% of patients.  After discussion with the patient today, the patient voiced understanding of all treatment options.The patient has elected excision of the ganglion.The risks and benefits of the procedure were explained to the patient, which include, but are not limited to: Bleeding, infection, recurrence, pain, scar, damage to tendons, damage to nerves, and damage to blood vessels, failure to give desired results and complications related to anesthesia.  These risks, along with alternative conservative treatment options, and postoperative protocols were voiced back and understood by the patient.  All  questions were answered to the patient's satisfaction.  The patient agrees to comply with a standard postoperative protocol, and is willing to proceed.  Education was provided via written and auditory forms.  There were no barriers to learning. Written handouts regarding wound care, incision and scar care, and general preoperative information was provided to the patient.  Prior to surgery, the patient may be requested to stop all anti-inflammatory medications.  Prophylactic aspirin, Plavix, and Coumadin may be allowed to be continued.  Medications including vitamin E., ginkgo, and fish oil are requested to be stopped approximately one week prior to surgery.  Hypertensive medications and beta blockers, if taken, should be continued.    ____________________________________________________________________________________________________________________________________________      CHIEF COMPLAINT:  Right wrist mass    SUBJECTIVE:  Dannie Mcgarry is a 49 y.o. female who presents with a mass of the right wrist. She was seen in the ED on 11/23/2024, where X-rays were obtained. She was placed in a wrist immobilizer by the ED. She states this mass has been present for a few years; however, she feels it has grown in size over the past few days. She reports increased pain of the mass.  Her right hand has been swollen for the past few days. She is experiencing a flare of her lupus currently. She was sent for U/S of wrist at last visit and presents today to review results.  Radiation: Yes to the  forearm  Previous Treatments: NSAIDs, Tylenol, activity modification, and bracing with only partial relief  Associated symptoms: Numbness of right thumb, index, and long fingers  Handedness: right  Work status: strawberry harvester    I have personally reviewed all the relevant PMH, PSH, SH, FH, Medications and allergies      PAST MEDICAL HISTORY:  Past Medical History:   Diagnosis Date    Abnormal ECG 12/2015    Interventricular  conduction relay disorder    Abnormal Pap smear of cervix 2015    See medical records from Dr. Rosa    Anxiety     Arthritis     hands    Diabetes mellitus (HCC)     Disease of thyroid gland     hypo    HPV (human papilloma virus) infection 2015    See medical records from Dr. Rosa    HTN (hypertension)     Hyperlipidemia     Hypertension     Irregular heart beat     interventricular. conduction relay disorder    Nausea 2015    since gastric sleeve    Ovarian cyst     Urinary tract infection     chronic issue    Varicella 1985       PAST SURGICAL HISTORY:  Past Surgical History:   Procedure Laterality Date    BARIATRIC SURGERY  Dec 2015    Gastric Sleeve    CERVICAL BIOPSY N/A 3/2/2017    Procedure: BIOPSY LEEP CERVIX;  Surgeon: Lon Rosa MD;  Location: [facility] MAIN OR;  Service:     CERVICAL BIOPSY  W/ LOOP ELECTRODE EXCISION  Mar 2017    See medical records from Dr. Rosa    ESOPHAGOGASTRODUODENOSCOPY      GASTRIC RESTRICTION SURGERY  2015    sleeve at age 40    CT EXC TUMOR SOFT TISS FACE&SCALP SUBFASCIAL 2 CM/> N/A 1/3/2024    Procedure: EXCISION OF FOREHEAD LIPOMA WITH COMPLEX CLOSURE;  Surgeon: Luz Diallo MD;  Location: [facility] MAIN OR;  Service: Plastics    TONSILLECTOMY      adenoids-age 30    WISDOM TOOTH EXTRACTION      x4-age 18       FAMILY HISTORY:  Family History   Problem Relation Age of Onset    Diabetes Mother         diet controlled    Diabetes Father         insulin    Other Father         agent orange       SOCIAL HISTORY:  Social History     Tobacco Use    Smoking status: Former     Current packs/day: 0.00     Average packs/day: 0.3 packs/day for 10.0 years (2.5 ttl pk-yrs)     Types: Cigarettes     Start date: 1994     Quit date:      Years since quittin.8     Passive exposure: Past    Smokeless tobacco: Never   Vaping Use    Vaping status: Never Used   Substance Use Topics    Alcohol use: Yes     Alcohol/week: 6.0 standard drinks of alcohol     Types: 6  Standard drinks or equivalent per week     Comment: 3-4 weekly Vodka    Drug use: Yes     Frequency: 7.0 times per week     Types: Marijuana     Comment: occasionally       MEDICATIONS:  No current facility-administered medications for this encounter.    Current Outpatient Medications:     acetaminophen-codeine (TYLENOL with CODEINE #3) 300-30 MG per tablet, Take 1 tablet by mouth every 6 (six) hours as needed, Disp: , Rfl:     amLODIPine (NORVASC) 10 mg tablet, Take 1 tablet (10 mg total) by mouth daily, Disp: 90 tablet, Rfl: 3    atorvastatin (LIPITOR) 10 mg tablet, Take 1 tablet (10 mg total) by mouth daily, Disp: 90 tablet, Rfl: 3    Cetirizine HCl (ZYRTEC ALLERGY PO), Take 20 mg by mouth every morning, Disp: , Rfl:     glipiZIDE (GLUCOTROL XL) 5 mg 24 hr tablet, Take 1 tablet (5 mg total) by mouth daily with breakfast, Disp: 90 tablet, Rfl: 3    Jardiance 10 MG TABS tablet, Take 1 tablet (10 mg total) by mouth daily, Disp: 90 tablet, Rfl: 3    levothyroxine 112 mcg tablet, Take 1 tablet (112 mcg total) by mouth every morning, Disp: 30 tablet, Rfl: 5    Mounjaro 5 MG/0.5ML, Inject 0.5 mL (5 mg total) under the skin every 7 days (Patient taking differently: Inject 5 mg under the skin every 7 days mondays), Disp: 2 mL, Rfl: 3    PARoxetine (PAXIL) 30 mg tablet, Take 1 tablet (30 mg total) by mouth every morning, Disp: 90 tablet, Rfl: 1    ibuprofen (MOTRIN) 800 mg tablet, Take 800 mg by mouth every 8 (eight) hours as needed, Disp: , Rfl:     Mounjaro 5 MG/0.5ML SOAJ, INJECT 0.5 ML (5 MG TOTAL) UNDER THE SKIN EVERY 7 DAYS, Disp: , Rfl:     ALLERGIES:  Allergies   Allergen Reactions    Arco (Diagnostic) - Food Allergy Anaphylaxis     All stone nuts. Lips/tongue swell-Birch Tree allergy    Bee Venom Anaphylaxis     All over swelling/pain/redness    Soy Allergy - Food Allergy Anaphylaxis, Hives, Itching, Palpitations and Swelling           REVIEW OF SYSTEMS:  Pertinent items are noted in HPI.  A comprehensive review  of systems was negative.    VITALS:  There were no vitals filed for this visit.      LABS:  HgA1c:   Lab Results   Component Value Date    HGBA1C 5.6 09/17/2024     BMP:   Lab Results   Component Value Date    GLUCOSE 114 (H) 12/29/2017    CALCIUM 8.0 04/15/2018     12/29/2017    K 4.2 09/17/2024    CO2 27 09/17/2024     09/17/2024    BUN 11 09/17/2024    CREATININE 0.71 09/17/2024       _____________________________________________________  PHYSICAL EXAMINATION:  General: well developed and well nourished, alert, oriented times 3, and appears comfortable  Psychiatric: Normal  HEENT: Normocephalic, Atraumatic Trachea Midline, No torticollis  Pulmonary: No audible wheezing or respiratory distress   Abdomen/GI: Non tender, non distended   Cardiovascular: Regular rate and rhythm, No pitting edema, 2+ radial pulse   Skin: Mass of right wrist, No Erythema, No Lacerations, No Fluctuation, No Ulcerations  Neurovascular: Sensation Intact to the Median, Ulnar, Radial Nerve, Motor Intact to the Median, Ulnar, Radial Nerve, and Pulses Intact  Musculoskeletal: Normal, except as noted in detailed exam and in HPI.        FOCUSED MUSCULOSKELETAL EXAMINATION:  Right Upper Extremity  Inspection: skin intact, mass about volar radial wrist, no erythema  Palpation: volar ganglion of radial right wrist, ttp, well circumscribed mobile painful mass roughly 2cm at volar radial wrist  Neurologic: 5/5 elbow flexion, 5/5 elbow extension, 5/5 wrist extension, 5/5 wrist flexion, 5/5 finger flexion, 5/5 finger extension, 5/5 FPL, 5/5 EPL, 5/5 APB, 5/5 intrinsics, sensation intact to median, radial, and ulnar nerve distributions  Vascular: Palpable radial pulse, brisk cap refill <2sec, hand warm and well perfused  MSK:   RIGHT SIDE:  Wrist:  Full ROM, No Instability, and Positive Tenderness volar radial ganglion  ___________________________________________________  STUDIES REVIEWED:  Xrays of the right wrist were obtained on  11/23/2024 in the ED were independently reviewed which demonstrates no acute osseous abnormalities.    U/S of right wrist obtained on 11/27/24 was independently reviewed which demonstrate complex volar cyst with clear stalk extending from radiocarpal joint.    LABS REVIEWED:    HgA1c:   Lab Results   Component Value Date    HGBA1C 5.6 09/17/2024     BMP:   Lab Results   Component Value Date    GLUCOSE 114 (H) 12/29/2017    CALCIUM 8.0 04/15/2018     12/29/2017    K 4.2 09/17/2024    CO2 27 09/17/2024     09/17/2024    BUN 11 09/17/2024    CREATININE 0.71 09/17/2024               PROCEDURES PERFORMED:  No Procedures performed today    _____________________________________________________      I agree with the history, physical examination, assessment and plan of care as documented above.    Faisal Garcia M.D.  Attending, Orthopaedic Surgery  Hand, Wrist, and Elbow Surgery  [facility]

## 2024-12-12 NOTE — PROGRESS NOTES
Reviewed discharge instructions with patient and  at bedside. Answered questions appropriately and assisted patient into civilian clothing. Transported patient via wheelchair to front entrance. Nicolle Starr

## 2024-12-12 NOTE — INTERVAL H&P NOTE
H&P reviewed. After examining the patient I find no changes in the patients condition since the H&P had been written.    Vitals:    12/12/24 0754   BP: (!) 171/95   Pulse: 69   Resp: 16   Temp: (!) 97.3 °F (36.3 °C)   SpO2: 99%

## 2024-12-12 NOTE — ANESTHESIA PROCEDURE NOTES
Peripheral Block    Patient location during procedure: holding area  Start time: 12/12/2024 9:58 AM  Reason for block: at surgeon's request and post-op pain management  Staffing  Performed by: Lisa Hernandez MD  Authorized by: Lisa Hernandez MD    Preanesthetic Checklist  Completed: patient identified, IV checked, site marked, risks and benefits discussed, surgical consent, monitors and equipment checked, pre-op evaluation and timeout performed  Peripheral Block  Prep: ChloraPrep  Patient monitoring: frequent blood pressure checks, continuous pulse oximetry and heart rate  Block type: Supraclavicular  Laterality: right  Injection technique: single-shot  Procedures: ultrasound guided, Ultrasound guidance required for the procedure to increase accuracy and safety of medication placement and decrease risk of complications.  Ultrasound permanent image saved  ropivacaine (NAROPIN) 0.5 % injection 20 mL - Perineural   20 mL - 12/12/2024 9:55:00 AM  midazolam (VERSED) injection 0.5 mg - Intravenous   2 mg - 12/12/2024 9:53:00 AM  Needle  Needle type: Stimuplex   Needle gauge: 22 G  Needle length: 2 in  Needle localization: anatomical landmarks and ultrasound guidance  Needle insertion depth: 4.5 cm  Assessment  Injection assessment: incremental injection, frequent aspiration, injected with ease, negative aspiration, negative for heart rate change, no paresthesia on injection, no symptoms of intraneural/intravenous injection and needle tip visualized at all times  Paresthesia pain: none  Post-procedure:  site cleaned  patient tolerated the procedure well with no immediate complications

## 2024-12-17 ENCOUNTER — OFFICE VISIT (OUTPATIENT)
Age: 49
End: 2024-12-17
Payer: COMMERCIAL

## 2024-12-17 ENCOUNTER — TELEPHONE (OUTPATIENT)
Age: 49
End: 2024-12-17

## 2024-12-17 VITALS
TEMPERATURE: 98.6 F | DIASTOLIC BLOOD PRESSURE: 78 MMHG | HEART RATE: 71 BPM | BODY MASS INDEX: 32.57 KG/M2 | OXYGEN SATURATION: 98 % | HEIGHT: 64 IN | SYSTOLIC BLOOD PRESSURE: 110 MMHG

## 2024-12-17 DIAGNOSIS — M35.1 MIXED CONNECTIVE TISSUE DISEASE (HCC): Primary | ICD-10-CM

## 2024-12-17 DIAGNOSIS — G89.29 CHRONIC PAIN OF BOTH SHOULDERS: ICD-10-CM

## 2024-12-17 DIAGNOSIS — R76.8 ANTI-RNP ANTIBODIES PRESENT: ICD-10-CM

## 2024-12-17 DIAGNOSIS — M25.511 CHRONIC PAIN OF BOTH SHOULDERS: ICD-10-CM

## 2024-12-17 DIAGNOSIS — M19.90 INFLAMMATORY ARTHRITIS: ICD-10-CM

## 2024-12-17 DIAGNOSIS — Z51.81 ENCOUNTER FOR METHOTREXATE MONITORING: ICD-10-CM

## 2024-12-17 DIAGNOSIS — M25.512 CHRONIC PAIN OF BOTH SHOULDERS: ICD-10-CM

## 2024-12-17 DIAGNOSIS — Z79.631 ENCOUNTER FOR METHOTREXATE MONITORING: ICD-10-CM

## 2024-12-17 PROCEDURE — 99215 OFFICE O/P EST HI 40 MIN: CPT | Performed by: STUDENT IN AN ORGANIZED HEALTH CARE EDUCATION/TRAINING PROGRAM

## 2024-12-17 PROCEDURE — 88304 TISSUE EXAM BY PATHOLOGIST: CPT | Performed by: PATHOLOGY

## 2024-12-17 RX ORDER — METHOTREXATE 2.5 MG/1
15 TABLET ORAL WEEKLY
Qty: 24 TABLET | Refills: 3 | Status: SHIPPED | OUTPATIENT
Start: 2024-12-17

## 2024-12-17 RX ORDER — FOLIC ACID 1 MG/1
1 TABLET ORAL DAILY
Qty: 30 TABLET | Refills: 5 | Status: SHIPPED | OUTPATIENT
Start: 2024-12-17

## 2024-12-17 NOTE — PROGRESS NOTES
Name: Dannie Mcgarry      : 1975      MRN: [id number]  Encounter Provider: Anu Chatman MD  Encounter Date: 2024   Encounter department: [facility] [address]  :  Assessment & Plan  Mixed connective tissue disease (HCC)  Patient has psoriasis and signs of inflammatory arthropathy, also seem to have questionable tightness to the skin on the distal digits which seemed similar to sclerodactyly in the setting of positive RNP antibody.  Her inflammatory arthropathy may be related to her psoriasis most likely which has not been treated with DMARDs in the past.  Plan:  Discussed benefits and risks of starting methotrexate including avoiding alcohol and pregnancy  Prescribed methotrexate 6 tablets weekly with daily folic acid  Will check labs in 2 weeks then 1 month and then every 3 months after  RTC in 3-4 months  Orders:    methotrexate 2.5 MG tablet; Take 6 tablets (15 mg total) by mouth once a week    CBC and differential; Standing    Comprehensive metabolic panel; Standing    Creatine Kinase, Total; Future    Sedimentation rate, automated; Future    C-reactive protein; Future    folic acid (FOLVITE) 1 mg tablet; Take 1 tablet (1 mg total) by mouth daily    Anti-RNP antibodies present    Orders:    methotrexate 2.5 MG tablet; Take 6 tablets (15 mg total) by mouth once a week    CBC and differential; Standing    Comprehensive metabolic panel; Standing    Creatine Kinase, Total; Future    Sedimentation rate, automated; Future    C-reactive protein; Future    folic acid (FOLVITE) 1 mg tablet; Take 1 tablet (1 mg total) by mouth daily    Inflammatory arthritis    Orders:    methotrexate 2.5 MG tablet; Take 6 tablets (15 mg total) by mouth once a week    CBC and differential; Standing    Comprehensive metabolic panel; Standing    Creatine Kinase, Total; Future    Sedimentation rate, automated; Future    C-reactive protein; Future    folic acid (FOLVITE) 1 mg tablet; Take 1 tablet (1 mg  "total) by mouth daily    Chronic pain of both shoulders  Patient had pain and stiffness decreased mobility in bilateral shoulders, may be a rotator cuff tendinopathy.  No objective weakness seen, more likely limited motion due to pain.  Plan  X-ray of bilateral shoulder  Encouraged to follow-up with orthopedics for further workup as necessary  Orders:    XR shoulder 2+ vw right; Future    XR shoulder 2+ vw left; Future    Encounter for methotrexate monitoring  Labs every 3 months  Orders:    CBC and differential; Future    Comprehensive metabolic panel; Future        History of Present Illness   HPI  Dannie Mcgarry is a 49 y.o. female who presents for follow up of  MCTD and inflammatory arthritis.     Rheumatic Disease Summary  -First seen November 2024 with high titer positive RNP ab (7.5), + RESHMA (no titer)  -presented with features of ?Inflammatory arthritis like RA vs tightness in the skin of fingers that could be similar to scleroderma, also has Raynaud's ,dysphagia, etc. At this time not enough evidence that she meets criteria for a specific rheumatologic condition but will order a few more subserologies.     She recently had right wrist ganglion cyst removal.    She is presenting with worse persistent swelling and stiffness of her bilateral hands, her knees and elbows have also been hurting.     Also complains of bilateral shoulder pain, stiffness and limited ROM.    ESR and CRP from November were within normal limits. Other labs were pending.        Review of Systems   Constitutional:  Positive for fatigue.   HENT: Negative.     Eyes: Negative.    Respiratory: Negative.     Cardiovascular: Negative.    Gastrointestinal: Negative.    Genitourinary: Negative.    Musculoskeletal:  Positive for arthralgias, gait problem and joint swelling.   Skin: Negative.           Objective   /78 (BP Location: Left arm, Cuff Size: Standard)   Pulse 71   Temp 98.6 °F (37 °C)   Ht 5' 3.6\" (1.615 m)   LMP 11/22/2020 " (Approximate)   SpO2 98%   BMI 32.57 kg/m²      Physical Exam  Constitutional:       Appearance: Normal appearance.   HENT:      Head: Normocephalic.   Eyes:      Extraocular Movements: Extraocular movements intact.      Pupils: Pupils are equal, round, and reactive to light.   Cardiovascular:      Pulses: Normal pulses.      Heart sounds: Normal heart sounds.   Pulmonary:      Effort: Pulmonary effort is normal.      Breath sounds: Normal breath sounds.   Musculoskeletal:      Comments: MSK Exam  The following areas have been examined today and do not show any signs of synovitis, tenderness, or restricted ROM unless otherwise specified below:  Neck  Shoulders  Back  Elbows  Wrists  Hands - mild swelling in bilateral hands and tenderness at PIPs  Hips  Knees  Ankles  Feet     *Muscle strength is normal in all extremities, just limited by joint pain   Neurological:      Mental Status: She is alert and oriented to person, place, and time.

## 2024-12-17 NOTE — TELEPHONE ENCOUNTER
Can you get in touch with [facility] to see what happened to this patient's labs that I ordered?  It is not available in our system but she did show me on her phone that some of them seem to be in process and the rest she has no idea.

## 2024-12-18 NOTE — TELEPHONE ENCOUNTER
I called [facility] and they faxed over pts blood work results from 11/27 which they stated was her most recent blood work they had on file. They did advise that the Anti-U3 RNP test is still resulting and the test is backed up. I scanned the results into media.

## 2024-12-24 ENCOUNTER — OFFICE VISIT (OUTPATIENT)
Dept: OBGYN CLINIC | Facility: CLINIC | Age: 49
End: 2024-12-24

## 2024-12-24 VITALS — WEIGHT: 187 LBS | BODY MASS INDEX: 33.13 KG/M2 | HEIGHT: 63 IN

## 2024-12-24 DIAGNOSIS — R22.31 MASS OF RIGHT WRIST: Primary | ICD-10-CM

## 2024-12-24 LAB
CRP SERPL-MCNC: <1 MG/L (ref 0–10)
ENA PM/SCL AB SER-ACNC: <20 UNITS
ERYTHROCYTE [SEDIMENTATION RATE] IN BLOOD BY WESTERGREN METHOD: 25 MM/HR (ref 0–32)
FIBRILLARIN AB SER QL: NEGATIVE

## 2024-12-24 PROCEDURE — 99024 POSTOP FOLLOW-UP VISIT: CPT | Performed by: STUDENT IN AN ORGANIZED HEALTH CARE EDUCATION/TRAINING PROGRAM

## 2024-12-24 NOTE — PROGRESS NOTES
Assessment/Plan:  Patient ID: Dannie Mcgarry 49 y.o. female   Surgery: Excision Right Volar Wrist Mass - Right  Date of Surgery: 12/12/2024    Tylenol       - Sutures were removed without complication. Patient educated to begin scar massage and regular daily digital ROM.       - Patient will follow up ***        CHIEF COMPLAINT:  Chief Complaint   Patient presents with    Right Hand - Post-op         SUBJECTIVE:  Dannie Mcgarry is a 49 y.o. year old female who presents for follow up after Excision Right Volar Wrist Mass - Right. Today patient is doing well. Pain well controlled. ***      PHYSICAL EXAMINATION:  General: well developed and well nourished, alert, oriented times 3, and appears comfortable  Psychiatric: Normal    MUSCULOSKELETAL EXAMINATION:  Incision: Clean, dry, intact  Surgery Site: normal, no evidence of infection   Range of Motion: As expected and Limited due to stiffness  Neurovascular status: Neuro intact, good cap refill  Activity Restrictions: No restrictions       FOCUSED MUSCULOSKELETAL EXAMINATION:  Right Upper Extremity  Inspection: incision clean, dry and intact. No surrounding erythema.   Palpation:  Appropriate shiva-incisional tenderness to palpation  Neurologic:  5/5 elbow flexion, 5/5 elbow extension, 5/5 wrist extension, 4/5 wrist flexion, 5/5 finger flexion, 5/5 finger extension, sensation intact to median, radial, and ulnar nerve distributions  Vascular: Palpable radial pulse, brisk cap refill <2sec, hand warm and well perfused  MSK:   Right wrist   ROM/strength limited by surgery/immobilization      STUDIES REVIEWED:  No Studies to review      PROCEDURES PERFORMED:  Procedures  No Procedures performed today      I agree with the history, physical examination, assessment and plan of care as documented above.    Faisal Garcia M.D.  Attending, Orthopaedic Surgery  Hand, Wrist, and Elbow Surgery  [facility]

## 2024-12-24 NOTE — PROGRESS NOTES
Assessment/Plan:  Patient ID: Dannie Mcgarry 49 y.o. female   Surgery: Excision Right Volar Wrist Mass - Right  Date of Surgery: 12/12/2024    Tylenol and bracing. No restriction on activities at this point.    - Sutures were removed without complication. Patient educated to begin scar massage and regular daily digital ROM.  Silicone scar tape applied.   - Patient will follow up prn    CHIEF COMPLAINT:  Chief Complaint   Patient presents with    Right Hand - Post-op       SUBJECTIVE:  Dannie Mcgarry is a 49 y.o. year old female who presents for follow up after Excision Right Volar Wrist Mass - Right. Today patient is doing well. Pain well controlled. Having some stiffness, secondary to surgery.        PHYSICAL EXAMINATION:  General: well developed and well nourished, alert, oriented times 3, and appears comfortable  Psychiatric: Normal    MUSCULOSKELETAL EXAMINATION:  Incision: Clean, dry, intact  Surgery Site: normal, no evidence of infection suture intact, tails removed.   Range of Motion: As expected  Neurovascular status: Neuro intact, good cap refill  Activity Restrictions: No restrictions       FOCUSED MUSCULOSKELETAL EXAMINATION:  Right Upper Extremity  Inspection: incision clean, dry and intact. No surrounding erythema.   Palpation:  Appropriate shiva-incisional tenderness to palpation  Neurologic:  5/5 elbow flexion, 5/5 elbow extension, 3/5 wrist extension, 3/5 wrist flexion, 5/5 finger flexion, 5/5 finger extension, sensation intact to median, radial, and ulnar nerve distributions  Vascular: Palpable radial pulse, brisk cap refill <2sec, hand warm and well perfused  MSK: right wrist ROM/strength limited by surgery/immobilization        STUDIES REVIEWED:  No Studies to review      PROCEDURES PERFORMED:  Procedures   None. Silicone scar tape applied.       I agree with the history, physical examination, assessment and plan of care as documented above.    Faisal Garcia M.D.  Attending, Orthopaedic  Surgery  Hand, Wrist, and Elbow Surgery  [facility]

## 2024-12-24 NOTE — PROGRESS NOTES
"Assessment/Plan:  Patient ID: Dannie Mcgarry 49 y.o. female   Surgery: Excision Right Volar Wrist Mass - Right  Date of Surgery: 12/12/2024    {kmtreatments:71984}    - Sutures were removed without complication. Patient educated to begin scar massage and regular daily digital ROM.     - *** Patient may benefit from course of formal physical therapy, script and protocol provided.     - Patient will follow up ***        CHIEF COMPLAINT:  No chief complaint on file.        SUBJECTIVE:  Dannie Mcgarry is a 49 y.o. year old female who presents for follow up after Excision Right Volar Wrist Mass - Right. Today patient is doing well. Pain well controlled. Patient was seen by rheumatology on 12/17/24 and started on Methotrexate 2.5 mg for inflammatory arthritis, likely related to her psoriasis.       PHYSICAL EXAMINATION:  General: {1234:13447::\"well developed and well nourished\",\"alert\",\"oriented times 3\",\"appears comfortable\"}  Psychiatric: {Psych:25726::\"Normal\"}    MUSCULOSKELETAL EXAMINATION:  Incision: {post op incision:18286::\"Clean, dry, intact\"}  Surgery Site: {post op incision:02773}  Range of Motion: {post op rom:81050::\"As expected\"}  Neurovascular status: {post op neuro exam:39410::\"Neuro intact, good cap refill\"}  Activity Restrictions: {post op activity:50977::\"No restrictions\"}  {anything done today?:54268}    FOCUSED MUSCULOSKELETAL EXAMINATION:  Right Upper Extremity  Inspection: incision clean, dry and intact. No surrounding erythema.   Palpation:  Appropriate shiva-incisional tenderness to palpation  Neurologic:  5/5 elbow flexion, 5/5 elbow extension, ***/5 wrist extension, ***/5 wrist flexion, ***/5 finger flexion, ***/5 finger extension, ***/5 FPL, ***/5 EPL, ***/5 APB, ***/5 intrinsics, sensation intact to median, radial, and ulnar nerve distributions  Vascular: Palpable radial pulse, brisk cap refill <2sec, hand warm and well perfused  MSK: *** ROM/strength limited by surgery/immobilization    Left " "Upper Extremity  Inspection: incision clean, dry and intact. No surrounding erythema.   Palpation:  Appropriate shiva-incisional tenderness to palpation  Neurologic:  5/5 elbow flexion, 5/5 elbow extension, ***/5 wrist extension, ***/5 wrist flexion, ***/5 finger flexion, ***/5 finger extension, ***/5 FPL, ***/5 EPL, ***/5 APB, ***/5 intrinsics, sensation intact to median, radial, and ulnar nerve distributions  Vascular: Palpable radial pulse, brisk cap refill <2sec, hand warm and well perfused  MSK: *** ROM/strength limited by surgery/immobilization        STUDIES REVIEWED:  {kmimagin::\"No Studies to review\"}      PROCEDURES PERFORMED:  Procedures  {Was Procdoc done:22879::\"No Procedures performed today\"}      I agree with the history, physical examination, assessment and plan of care as documented above.    Faisal Garcia M.D.  Attending, Orthopaedic Surgery  Hand, Wrist, and Elbow Surgery  [facility]  "

## 2025-01-10 LAB
ALBUMIN SERPL-MCNC: 4.2 G/DL (ref 3.9–4.9)
ALP SERPL-CCNC: 101 IU/L (ref 44–121)
ALT SERPL-CCNC: 37 IU/L (ref 0–32)
AST SERPL-CCNC: 56 IU/L (ref 0–40)
BASOPHILS # BLD AUTO: 0 X10E3/UL (ref 0–0.2)
BASOPHILS NFR BLD AUTO: 1 %
BILIRUB SERPL-MCNC: 0.4 MG/DL (ref 0–1.2)
BUN SERPL-MCNC: 15 MG/DL (ref 6–24)
BUN/CREAT SERPL: 21 (ref 9–23)
CALCIUM SERPL-MCNC: 8.9 MG/DL (ref 8.7–10.2)
CHLORIDE SERPL-SCNC: 104 MMOL/L (ref 96–106)
CO2 SERPL-SCNC: 23 MMOL/L (ref 20–29)
CREAT SERPL-MCNC: 0.71 MG/DL (ref 0.57–1)
CRP SERPL-MCNC: 2 MG/L (ref 0–10)
EGFR: 104 ML/MIN/1.73
EOSINOPHIL # BLD AUTO: 0.1 X10E3/UL (ref 0–0.4)
EOSINOPHIL NFR BLD AUTO: 6 %
ERYTHROCYTE [DISTWIDTH] IN BLOOD BY AUTOMATED COUNT: 14.3 % (ref 11.7–15.4)
ERYTHROCYTE [SEDIMENTATION RATE] IN BLOOD BY WESTERGREN METHOD: 11 MM/HR (ref 0–32)
GLOBULIN SER-MCNC: 2.5 G/DL (ref 1.5–4.5)
GLUCOSE SERPL-MCNC: 111 MG/DL (ref 70–99)
HCT VFR BLD AUTO: 40.3 % (ref 34–46.6)
HGB BLD-MCNC: 13.3 G/DL (ref 11.1–15.9)
IMM GRANULOCYTES # BLD: 0 X10E3/UL (ref 0–0.1)
IMM GRANULOCYTES NFR BLD: 0 %
LYMPHOCYTES # BLD AUTO: 0.9 X10E3/UL (ref 0.7–3.1)
LYMPHOCYTES NFR BLD AUTO: 39 %
MCH RBC QN AUTO: 32.4 PG (ref 26.6–33)
MCHC RBC AUTO-ENTMCNC: 33 G/DL (ref 31.5–35.7)
MCV RBC AUTO: 98 FL (ref 79–97)
MONOCYTES # BLD AUTO: 0.2 X10E3/UL (ref 0.1–0.9)
MONOCYTES NFR BLD AUTO: 10 %
NEUTROPHILS # BLD AUTO: 1 X10E3/UL (ref 1.4–7)
NEUTROPHILS NFR BLD AUTO: 44 %
PLATELET # BLD AUTO: 160 X10E3/UL (ref 150–450)
POTASSIUM SERPL-SCNC: 3.9 MMOL/L (ref 3.5–5.2)
PROT SERPL-MCNC: 6.7 G/DL (ref 6–8.5)
RBC # BLD AUTO: 4.1 X10E6/UL (ref 3.77–5.28)
SODIUM SERPL-SCNC: 143 MMOL/L (ref 134–144)
WBC # BLD AUTO: 2.4 X10E3/UL (ref 3.4–10.8)

## 2025-02-03 DIAGNOSIS — E11.9 TYPE 2 DIABETES MELLITUS WITHOUT COMPLICATION, WITHOUT LONG-TERM CURRENT USE OF INSULIN (HCC): ICD-10-CM

## 2025-02-03 NOTE — PROGRESS NOTES
Name: Dannie Mcgarry      : 1975      MRN: [id number]  Encounter Provider: Anu Chatman MD  Encounter Date: 2025   Encounter department: [facility] [address]  :  Assessment & Plan  Mixed connective tissue disease (HCC)  Patient has psoriasis and signs of inflammatory arthropathy, also seem to have questionable tightness to the skin on the distal digits which seemed similar to sclerodactyly in the setting of positive RNP antibody.  Her inflammatory arthropathy may be related to her psoriasis most likely which has not been treated with DMARDs in the past.  Since last visit was started on methotrexate, however she has not seen any noticeable improvement in inflammatory arthritis of her hands bilaterally. In addition her labs do seem to show elevated LFTs and low blood count,which is most likely a reaction to the methotrexate.  Discussed benefits and risks of alternate therapy such as leflunomide.  [place]  Discontinue methotrexate  Check labs in 2 weeks, if stable will start leflunomide  We will check RF, CCP since they were not done earlier  X-rays of the hands for baseline,previously ordered x-ray of the shoulders bilaterally  Take ibuprofen as needed for pain management  RTC in 2-3 months  Orders:    RF Screen w/ Reflex to Titer; Future    Cyclic citrul peptide antibody, IgG; Future    RNA Polymerase III IgG Abs; Future    CBC and differential; Future    Comprehensive metabolic panel; Future    Sedimentation rate, automated; Future    C-reactive protein; Future    XR hand 3+ vw left; Future    XR hand 3+ vw right; Future    Anti-RNP antibodies present         Inflammatory arthritis    Orders:    RF Screen w/ Reflex to Titer; Future    Cyclic citrul peptide antibody, IgG; Future    RNA Polymerase III IgG Abs; Future    CBC and differential; Future    Comprehensive metabolic panel; Future    Sedimentation rate, automated; Future    C-reactive protein; Future    XR hand 3+ vw left; Future    " XR hand 3+ vw right; Future    High risk medication use    Orders:    CBC and differential; Future    Comprehensive metabolic panel; Future        History of Present Illness   HPI  Dannie Mcgarry is a 50 y.o. female who presents for evaluation of MCTD    Rheumatic Disease Summary  -First seen November 2024 with high titer positive RNP ab (7.5), + RESHMA (no titer)  -presented with features of ?Inflammatory arthritis like RA vs tightness in the skin of fingers that could be similar to scleroderma, also has Raynaud's ,dysphagia, and psoriasis etc.   -She had widespread skin involvement of psoriasis in the past, now on the extensor surfaces (elbows, back of knees). She is using topical clobetasol  -Not enough evidence that she meets criteria for a specific rheumatologic condition but will order a few more subserologies.   -was started on MTX 6 tablets weekly for inflammatory arthropathy  -Labs showed negative PM/SCL 75 antibody, negative U3 RNP    Labs from January showed normal ESR and CRP,  elevated AST 56 and ALT 37 and WBC 2.4.    Pending labs since last visit include rheumatoid factor, CCP, RNA christ III.    She has not noticed any significant difference in her inflammatory symptoms of her hands, still swollen and painful.     She also complains of having GI side effects to the methotrexate.          Review of Systems   Constitutional: Negative.    HENT: Negative.     Eyes: Negative.    Respiratory: Negative.     Cardiovascular: Negative.    Gastrointestinal:  Positive for abdominal pain and constipation.   Genitourinary: Negative.    Musculoskeletal:  Positive for arthralgias and joint swelling.   Skin: Negative.           Objective   /80 (BP Location: Right arm, Patient Position: Sitting, Cuff Size: Adult)   Pulse (!) 51   Temp (!) 97.2 °F (36.2 °C) (Tympanic)   Resp 18   Ht 5' 3\" (1.6 m)   Wt 89.8 kg (198 lb)   LMP 11/22/2020 (Approximate)   SpO2 96%   BMI 35.07 kg/m²      Physical Exam  HENT:      " Head: Normocephalic.   Eyes:      Extraocular Movements: Extraocular movements intact.      Pupils: Pupils are equal, round, and reactive to light.   Cardiovascular:      Pulses: Normal pulses.      Heart sounds: Normal heart sounds.   Pulmonary:      Effort: Pulmonary effort is normal.      Breath sounds: Normal breath sounds.   Musculoskeletal:      Comments: MSK Exam  The following areas have been examined today and do not show any signs of synovitis, tenderness, or restricted ROM unless otherwise specified below:  Neck  Shoulders  Back  Elbows  Wrists  Hands- swelling and tenderness in bilateral hands, mainly at MCP and PIPs  Hips  Knees  Ankles  Feet    Neurological:      Mental Status: She is alert and oriented to person, place, and time.

## 2025-02-04 ENCOUNTER — HOSPITAL ENCOUNTER (OUTPATIENT)
Dept: RADIOLOGY | Facility: HOSPITAL | Age: 50
Discharge: HOME | End: 2025-02-04
Payer: COMMERCIAL

## 2025-02-04 ENCOUNTER — OFFICE VISIT (OUTPATIENT)
Age: 50
End: 2025-02-04

## 2025-02-04 VITALS
DIASTOLIC BLOOD PRESSURE: 80 MMHG | WEIGHT: 198 LBS | RESPIRATION RATE: 18 BRPM | HEART RATE: 51 BPM | OXYGEN SATURATION: 96 % | HEIGHT: 63 IN | SYSTOLIC BLOOD PRESSURE: 120 MMHG | BODY MASS INDEX: 35.08 KG/M2 | TEMPERATURE: 97.2 F

## 2025-02-04 DIAGNOSIS — R76.8 ANTI-RNP ANTIBODIES PRESENT: ICD-10-CM

## 2025-02-04 DIAGNOSIS — M25.512 CHRONIC PAIN OF BOTH SHOULDERS: ICD-10-CM

## 2025-02-04 DIAGNOSIS — M25.511 CHRONIC PAIN OF BOTH SHOULDERS: ICD-10-CM

## 2025-02-04 DIAGNOSIS — G89.29 CHRONIC PAIN OF BOTH SHOULDERS: ICD-10-CM

## 2025-02-04 DIAGNOSIS — Z79.899 HIGH RISK MEDICATION USE: ICD-10-CM

## 2025-02-04 DIAGNOSIS — M19.90 INFLAMMATORY ARTHRITIS: ICD-10-CM

## 2025-02-04 DIAGNOSIS — M35.1 MIXED CONNECTIVE TISSUE DISEASE (HCC): ICD-10-CM

## 2025-02-04 DIAGNOSIS — M35.1 MIXED CONNECTIVE TISSUE DISEASE (HCC): Primary | ICD-10-CM

## 2025-02-04 PROCEDURE — 73030 X-RAY EXAM OF SHOULDER: CPT

## 2025-02-04 PROCEDURE — 73130 X-RAY EXAM OF HAND: CPT

## 2025-02-04 NOTE — PATIENT INSTRUCTIONS
Get XR of hands and shoulders done    Get labs done in 2 weeks, if everything looks good will order Leflunomide medication which is once a day.     We are stopping the methotrexate and folic acid.

## 2025-03-06 ENCOUNTER — TELEPHONE (OUTPATIENT)
Age: 50
End: 2025-03-06

## 2025-03-06 DIAGNOSIS — Z79.899 HIGH RISK MEDICATION USE: ICD-10-CM

## 2025-03-06 DIAGNOSIS — M35.1 MIXED CONNECTIVE TISSUE DISEASE (HCC): Primary | ICD-10-CM

## 2025-03-06 LAB
ALBUMIN SERPL-MCNC: 4.1 G/DL (ref 3.9–4.9)
ALP SERPL-CCNC: 104 IU/L (ref 44–121)
ALT SERPL-CCNC: 19 IU/L (ref 0–32)
AST SERPL-CCNC: 30 IU/L (ref 0–40)
BASOPHILS # BLD AUTO: 0 X10E3/UL (ref 0–0.2)
BASOPHILS NFR BLD AUTO: 1 %
BILIRUB SERPL-MCNC: 0.3 MG/DL (ref 0–1.2)
BUN SERPL-MCNC: 10 MG/DL (ref 6–24)
BUN/CREAT SERPL: 15 (ref 9–23)
CALCIUM SERPL-MCNC: 8.7 MG/DL (ref 8.7–10.2)
CHLORIDE SERPL-SCNC: 103 MMOL/L (ref 96–106)
CO2 SERPL-SCNC: 23 MMOL/L (ref 20–29)
CREAT SERPL-MCNC: 0.66 MG/DL (ref 0.57–1)
CRP SERPL-MCNC: 2 MG/L (ref 0–10)
EGFR: 107 ML/MIN/1.73
EOSINOPHIL # BLD AUTO: 0.2 X10E3/UL (ref 0–0.4)
EOSINOPHIL NFR BLD AUTO: 5 %
ERYTHROCYTE [DISTWIDTH] IN BLOOD BY AUTOMATED COUNT: 14.4 % (ref 11.7–15.4)
ERYTHROCYTE [SEDIMENTATION RATE] IN BLOOD BY WESTERGREN METHOD: 19 MM/HR (ref 0–32)
GLOBULIN SER-MCNC: 2.8 G/DL (ref 1.5–4.5)
GLUCOSE SERPL-MCNC: 117 MG/DL (ref 70–99)
HCT VFR BLD AUTO: 41.4 % (ref 34–46.6)
HGB BLD-MCNC: 13.8 G/DL (ref 11.1–15.9)
IMM GRANULOCYTES # BLD: 0 X10E3/UL (ref 0–0.1)
IMM GRANULOCYTES NFR BLD: 0 %
LYMPHOCYTES # BLD AUTO: 0.9 X10E3/UL (ref 0.7–3.1)
LYMPHOCYTES NFR BLD AUTO: 26 %
MCH RBC QN AUTO: 32.5 PG (ref 26.6–33)
MCHC RBC AUTO-ENTMCNC: 33.3 G/DL (ref 31.5–35.7)
MCV RBC AUTO: 98 FL (ref 79–97)
MONOCYTES # BLD AUTO: 0.3 X10E3/UL (ref 0.1–0.9)
MONOCYTES NFR BLD AUTO: 10 %
NEUTROPHILS # BLD AUTO: 1.9 X10E3/UL (ref 1.4–7)
NEUTROPHILS NFR BLD AUTO: 58 %
PLATELET # BLD AUTO: 182 X10E3/UL (ref 150–450)
POTASSIUM SERPL-SCNC: 3.7 MMOL/L (ref 3.5–5.2)
PROT SERPL-MCNC: 6.9 G/DL (ref 6–8.5)
RBC # BLD AUTO: 4.24 X10E6/UL (ref 3.77–5.28)
SODIUM SERPL-SCNC: 143 MMOL/L (ref 134–144)
WBC # BLD AUTO: 3.4 X10E3/UL (ref 3.4–10.8)

## 2025-03-06 RX ORDER — LEFLUNOMIDE 20 MG/1
20 TABLET ORAL DAILY
Qty: 30 TABLET | Refills: 3 | Status: SHIPPED | OUTPATIENT
Start: 2025-03-06

## 2025-03-06 NOTE — TELEPHONE ENCOUNTER
Pt's recent labs are improved from before, I have sent Leflunomide 20 mg tablet to be taken once a day to her [facility] pharmacy in [place]. She can begin taking this and let me know if any side effects etc.     She should have labs done in 1 month after starting the medication so that we can make sure her kidney/liver function and everything looks fine on this medication.

## 2025-03-12 ENCOUNTER — TELEPHONE (OUTPATIENT)
Dept: RHEUMATOLOGY | Facility: CLINIC | Age: 50
End: 2025-03-12

## 2025-03-13 DIAGNOSIS — F41.9 ANXIETY: ICD-10-CM

## 2025-03-18 NOTE — TELEPHONE ENCOUNTER
Scheduled for a physical 6/2/25, she could only do Mondays and that was the first Monday available for a physical. Please refill.

## 2025-03-19 ENCOUNTER — TELEPHONE (OUTPATIENT)
Dept: FAMILY MEDICINE CLINIC | Facility: CLINIC | Age: 50
End: 2025-03-19

## 2025-03-19 RX ORDER — PAROXETINE 30 MG/1
30 TABLET, FILM COATED ORAL EVERY MORNING
Qty: 90 TABLET | Refills: 1 | Status: SHIPPED | OUTPATIENT
Start: 2025-03-19

## 2025-03-24 DIAGNOSIS — E11.9 TYPE 2 DIABETES MELLITUS WITHOUT COMPLICATION, WITHOUT LONG-TERM CURRENT USE OF INSULIN (HCC): ICD-10-CM

## 2025-03-25 ENCOUNTER — TELEPHONE (OUTPATIENT)
Age: 50
End: 2025-03-25

## 2025-03-25 NOTE — TELEPHONE ENCOUNTER
PA tirzepatide 5 MG/0.5ML SUBMITTED     to [facility]     via    []CMM-KEY:    [x]Surescripts-Case ID # [id number]   []Availity-Auth ID #  NDC #    []Faxed to plan   []Other website    []Phone call Case ID #      []PA sent as URGENT    All office notes, labs and other pertaining documents and studies sent. Clinical questions answered. Awaiting determination from insurance company.     Turnaround time for your insurance to make a decision on your Prior Authorization can take 7-21 business days.

## 2025-03-26 NOTE — TELEPHONE ENCOUNTER
PA tirzepatide 5 MG/0.5ML APPROVED         Patient advised by          []MyChart Message  []Phone call   [x]LMOM  []L/M to call office as no active Communication consent on file  []Unable to leave detailed message as VM not approved on Communication consent       Pharmacy advised by    [x]Fax  []Phone call  []Secure Chat    Specialty Pharmacy    []

## 2025-04-03 LAB
ALBUMIN SERPL-MCNC: 4.1 G/DL (ref 3.9–4.9)
ALP SERPL-CCNC: 132 IU/L (ref 44–121)
ALT SERPL-CCNC: 37 IU/L (ref 0–32)
AST SERPL-CCNC: 63 IU/L (ref 0–40)
BASOPHILS # BLD AUTO: 0 X10E3/UL (ref 0–0.2)
BASOPHILS NFR BLD AUTO: 1 %
BILIRUB SERPL-MCNC: 0.4 MG/DL (ref 0–1.2)
BUN SERPL-MCNC: 10 MG/DL (ref 6–24)
BUN/CREAT SERPL: 16 (ref 9–23)
CALCIUM SERPL-MCNC: 9.2 MG/DL (ref 8.7–10.2)
CHLORIDE SERPL-SCNC: 101 MMOL/L (ref 96–106)
CO2 SERPL-SCNC: 21 MMOL/L (ref 20–29)
CREAT SERPL-MCNC: 0.64 MG/DL (ref 0.57–1)
EGFR: 108 ML/MIN/1.73
EOSINOPHIL # BLD AUTO: 0.2 X10E3/UL (ref 0–0.4)
EOSINOPHIL NFR BLD AUTO: 6 %
ERYTHROCYTE [DISTWIDTH] IN BLOOD BY AUTOMATED COUNT: 14.9 % (ref 11.7–15.4)
GLOBULIN SER-MCNC: 3.4 G/DL (ref 1.5–4.5)
GLUCOSE SERPL-MCNC: 148 MG/DL (ref 70–99)
HCT VFR BLD AUTO: 47.9 % (ref 34–46.6)
HGB BLD-MCNC: 15.1 G/DL (ref 11.1–15.9)
IMM GRANULOCYTES # BLD: 0 X10E3/UL (ref 0–0.1)
IMM GRANULOCYTES NFR BLD: 0 %
LYMPHOCYTES # BLD AUTO: 0.6 X10E3/UL (ref 0.7–3.1)
LYMPHOCYTES NFR BLD AUTO: 22 %
MCH RBC QN AUTO: 31.1 PG (ref 26.6–33)
MCHC RBC AUTO-ENTMCNC: 31.5 G/DL (ref 31.5–35.7)
MCV RBC AUTO: 99 FL (ref 79–97)
MONOCYTES # BLD AUTO: 0.4 X10E3/UL (ref 0.1–0.9)
MONOCYTES NFR BLD AUTO: 13 %
NEUTROPHILS # BLD AUTO: 1.7 X10E3/UL (ref 1.4–7)
NEUTROPHILS NFR BLD AUTO: 58 %
PLATELET # BLD AUTO: 180 X10E3/UL (ref 150–450)
POTASSIUM SERPL-SCNC: 3.6 MMOL/L (ref 3.5–5.2)
PROT SERPL-MCNC: 7.5 G/DL (ref 6–8.5)
RBC # BLD AUTO: 4.85 X10E6/UL (ref 3.77–5.28)
SODIUM SERPL-SCNC: 139 MMOL/L (ref 134–144)
WBC # BLD AUTO: 2.9 X10E3/UL (ref 3.4–10.8)

## 2025-04-08 ENCOUNTER — OFFICE VISIT (OUTPATIENT)
Age: 50
End: 2025-04-08
Payer: COMMERCIAL

## 2025-04-08 VITALS
BODY MASS INDEX: 34.2 KG/M2 | OXYGEN SATURATION: 100 % | SYSTOLIC BLOOD PRESSURE: 130 MMHG | WEIGHT: 193 LBS | DIASTOLIC BLOOD PRESSURE: 72 MMHG | HEART RATE: 74 BPM | HEIGHT: 63 IN

## 2025-04-08 DIAGNOSIS — R76.8 ANTI-RNP ANTIBODIES PRESENT: ICD-10-CM

## 2025-04-08 DIAGNOSIS — Z79.899 HIGH RISK MEDICATION USE: ICD-10-CM

## 2025-04-08 DIAGNOSIS — M35.1 MIXED CONNECTIVE TISSUE DISEASE (HCC): Primary | ICD-10-CM

## 2025-04-08 DIAGNOSIS — M79.7 FIBROMYALGIA: ICD-10-CM

## 2025-04-08 PROCEDURE — 99214 OFFICE O/P EST MOD 30 MIN: CPT | Performed by: STUDENT IN AN ORGANIZED HEALTH CARE EDUCATION/TRAINING PROGRAM

## 2025-04-08 RX ORDER — IBUPROFEN 800 MG/1
800 TABLET, FILM COATED ORAL 2 TIMES DAILY PRN
Qty: 90 TABLET | Refills: 0 | Status: SHIPPED | OUTPATIENT
Start: 2025-04-08

## 2025-04-08 RX ORDER — GABAPENTIN 100 MG/1
100 CAPSULE ORAL 3 TIMES DAILY
Qty: 90 CAPSULE | Refills: 0 | Status: SHIPPED | OUTPATIENT
Start: 2025-04-08

## 2025-04-08 RX ORDER — MYCOPHENOLATE MOFETIL 500 MG/1
TABLET ORAL
Qty: 60 TABLET | Refills: 3 | Status: CANCELLED | OUTPATIENT
Start: 2025-04-08

## 2025-04-08 NOTE — PROGRESS NOTES
Name: Dannie Mcgarry      : 1975      MRN: [id number]  Encounter Provider: Anu Chatman MD  Encounter Date: 2025   Encounter department: [facility] [address]  :  Assessment & Plan  Mixed connective tissue disease (HCC)  Patient has history of MCTD based on prior labs showing positive RESHMA (however no titer was reported), and positive RNP ab with Raynaud's.  She has had some symptoms of possible inflammatory arthritis with stiffness and swelling of the bilateral hands, however we did already try 2 different DMARDs including methotrexate and leflunomide, both of which did not make any difference in her symptoms, and also caused some lab abnormalities(leukopenia mainly) which resulted in us discontinuing it..  At this time she does not seem to have active synovitis on exam.  At this point would reevaluate the diagnosis and recheck labs to see if there really is any underlying autoimmune disease aside from her Raynaud's.  Most of her pain at this time seems to be fibromyalgia-like for neuropathy-related.  Plan:  Will recheck serologies  Prescribed ibuprofen to be taken as needed for pain management-PCP can take over this prescription for future refills  RTC 3 months  Orders:    CBC and differential; Future    Comprehensive metabolic panel; Future    ibuprofen (MOTRIN) 800 mg tablet; Take 1 tablet (800 mg total) by mouth 2 (two) times a day as needed (for pain)    gabapentin (Neurontin) 100 mg capsule; Take 1 capsule (100 mg total) by mouth 3 (three) times a day    RESHMA by IFA Reflex for Rheumatologist; Future    Anti-U1 RNP Ab (RDL); Future    C3 complement; Future    C4 complement; Future    RHEUMATOID FACTOR; Future    Cyclic citrul peptide antibody, IgG; Future    RNA Polymerase III IgG Abs; Future    Fibromyalgia  Patient has been having increasing overall body aches, myalgias, burning and stabbing pain in different muscle groups that have been on their own intermittently, and also is  very tender to touch on exam and several different trigger points.  Also been having problems with fatigue, which absence of other systemic symptoms that would be concerning for an autoimmune condition does raise suspicion for fibromyalgia.  Plan  -Discussed pain medication options, prescribed gabapentin 100 mg 3 times daily to be taken as needed-her PCP is to take over this prescription future refills if it is helpful for her and continue management  -Alternative medication options for fibromyalgia management that PCP can try include Lyrica, Cymbalta, or amitriptyline  -Can also consider things like physical therapy, vianney chi, yoga as other options  Orders:    ibuprofen (MOTRIN) 800 mg tablet; Take 1 tablet (800 mg total) by mouth 2 (two) times a day as needed (for pain)    gabapentin (Neurontin) 100 mg capsule; Take 1 capsule (100 mg total) by mouth 3 (three) times a day    High risk medication use    Orders:    CBC and differential; Future    Comprehensive metabolic panel; Future    Anti-RNP antibodies present             History of Present Illness   HPI  Dannie Mcgarry is a 50 y.o. female who presents for evaluation of MCTD.     Rheumatic Disease Summary  -First seen November 2024 with high titer positive RNP ab (7.5), + RESHMA (no titer), other labs showed negative scl-70, centromere ab, SSA/SSB, dsDNA, Ward, anti-Jo1 ab.   -presented with features of ?Inflammatory arthritis like RA vs tightness in the skin of fingers that could be similar to scleroderma, also has Raynaud's ,dysphagia, and psoriasis etc.   -She had widespread skin involvement of psoriasis in the past, now on the extensor surfaces (elbows, back of knees). She is using topical clobetasol  -Not enough evidence that she meets criteria for a specific rheumatologic condition but will order a few more subserologies.   -was started on MTX 6 tablets weekly for inflammatory arthropathy  -Labs showed negative PM/SCL 75 antibody, negative U3 RNP       Labs  "from this month showed AST/ALT mildly elevated, WBC count 2.9 , normal ESR and CRP.     Pending labs since last visit include rheumatoid factor, CCP, RNA christ III.     Since last visit she was having more body aches/ myalgias. She is getting stabbing pain in her muscles of her arms and legs. She said teeth are also hurting. She feels burning pain in the legs. She also gets very SOB with exertion in small distances. She is not sleeping well.     She stopped taking pain medications because she was having stomach pain. She has been trying to avoid taking more advil, which does help her with pain and sleeping. She noticed this was more of an issue since starting Leflunomide.     Her Raynaud's were not good this winter. She is taking amlodipine 10 mg daily.         Review of Systems   Constitutional: Negative.    HENT: Negative.     Eyes: Negative.    Respiratory: Negative.     Cardiovascular: Negative.    Gastrointestinal: Negative.    Genitourinary: Negative.    Musculoskeletal:  Positive for arthralgias and myalgias.   Psychiatric/Behavioral:  Positive for sleep disturbance.           Objective   /72   Pulse 74   Ht 5' 3\" (1.6 m)   Wt 87.5 kg (193 lb)   LMP 11/22/2020 (Approximate)   SpO2 100%   BMI 34.19 kg/m²      Physical Exam  Constitutional:       Appearance: Normal appearance.   HENT:      Head: Normocephalic.   Eyes:      Extraocular Movements: Extraocular movements intact.      Pupils: Pupils are equal, round, and reactive to light.   Cardiovascular:      Pulses: Normal pulses.      Heart sounds: Normal heart sounds.   Pulmonary:      Effort: Pulmonary effort is normal.      Breath sounds: Normal breath sounds.   Musculoskeletal:      Comments: MSK Exam  The following areas have been examined today and do not show any signs of synovitis, tenderness, or restricted ROM unless otherwise specified below:  Neck  Shoulders  Back  Elbows  Wrists  Hands-mild fullness in a few of the PIPs on bilateral hands, " no tenderness  Hips  Knees  Ankles  Feet     Patient has multiple muscle groups in the lateral upper and lower extremities are very tender to touch, and with just light touch   Neurological:      Mental Status: She is alert and oriented to person, place, and time.

## 2025-04-30 ENCOUNTER — TELEPHONE (OUTPATIENT)
Age: 50
End: 2025-04-30

## 2025-04-30 NOTE — LETTER
Dannie Mcgarry  [address] [address]      Dear Dannie Mcgarry,        You have received this letter in an attempt to contact you to schedule an office appointment with our office.  Please contact our office  at [phone]  to schedule at your preferred location.    Sincerely,       Patient Elmer  [facility]

## 2025-05-08 ENCOUNTER — OFFICE VISIT (OUTPATIENT)
Dept: FAMILY MEDICINE CLINIC | Facility: CLINIC | Age: 50
End: 2025-05-08
Payer: COMMERCIAL

## 2025-05-08 VITALS
RESPIRATION RATE: 18 BRPM | BODY MASS INDEX: 34.27 KG/M2 | HEART RATE: 80 BPM | DIASTOLIC BLOOD PRESSURE: 78 MMHG | HEIGHT: 63 IN | WEIGHT: 193.4 LBS | SYSTOLIC BLOOD PRESSURE: 134 MMHG | TEMPERATURE: 97.8 F

## 2025-05-08 DIAGNOSIS — M35.1 MIXED CONNECTIVE TISSUE DISEASE (HCC): ICD-10-CM

## 2025-05-08 DIAGNOSIS — M79.7 FIBROMYALGIA: ICD-10-CM

## 2025-05-08 PROCEDURE — 99213 OFFICE O/P EST LOW 20 MIN: CPT | Performed by: INTERNAL MEDICINE

## 2025-05-08 RX ORDER — GABAPENTIN 100 MG/1
100 CAPSULE ORAL 3 TIMES DAILY
Qty: 270 CAPSULE | Refills: 1 | Status: SHIPPED | OUTPATIENT
Start: 2025-05-08

## 2025-05-08 RX ORDER — IBUPROFEN 800 MG/1
800 TABLET, FILM COATED ORAL 2 TIMES DAILY PRN
Qty: 270 TABLET | Refills: 1 | Status: SHIPPED | OUTPATIENT
Start: 2025-05-08

## 2025-05-08 NOTE — PROGRESS NOTES
"Name: Dannie Mcgarry      : 1975      MRN: [id number]  Encounter Provider: Radha Carrillo MD  Encounter Date: 2025   Encounter department: [facility]  :  Assessment & Plan  Mixed connective tissue disease (HCC)    Orders:  •  gabapentin (Neurontin) 100 mg capsule; Take 1 capsule (100 mg total) by mouth 3 (three) times a day  •  ibuprofen (MOTRIN) 800 mg tablet; Take 1 tablet (800 mg total) by mouth 2 (two) times a day as needed (for pain)    Fibromyalgia  Her symptoms are largely relieved by current medications and she will continue.  Her xrays show evidence of possible early erosive arthritis and I urged her to get the labs ordered by rheumatology. Discussed gentle exercise such as stretching and yoga.  Asked patient to call sooner than next scheduled appointment for any complaints or issues.    Orders:  •  gabapentin (Neurontin) 100 mg capsule; Take 1 capsule (100 mg total) by mouth 3 (three) times a day  •  ibuprofen (MOTRIN) 800 mg tablet; Take 1 tablet (800 mg total) by mouth 2 (two) times a day as needed (for pain)           History of Present Illness   For the past few months she has had a lot of pain in hands and joints.  Had been having shooting pains, like nerve pain, throughout her body.   Saw rheumatology, did 6 weeks of methotrexate and 6 weeks of leflunomide without relief of her symptoms.  Has had multiple rounds of labwork.  The only thing that has helped her is ibuprofen and gabapentin.  She is fatigued.  Her hands and wrists are always swollen.  Reviewed xrays with patient. She has changes consistent with possible early inflammatory arthritis.       Review of Systems   Constitutional: Negative.    Respiratory: Negative.     Cardiovascular: Negative.    Musculoskeletal:  Positive for arthralgias, joint swelling and myalgias.       Objective   /78   Pulse 80   Temp 97.8 °F (36.6 °C)   Resp 18   Ht 5' 3\" (1.6 m)   Wt 87.7 kg (193 lb 6.4 oz)   LMP 2020 " (Approximate)   BMI 34.26 kg/m²      Physical Exam  Constitutional:       Appearance: Normal appearance.   HENT:      Head: Normocephalic and atraumatic.   Eyes:      Pupils: Pupils are equal, round, and reactive to light.   Cardiovascular:      Rate and Rhythm: Normal rate and regular rhythm.      Heart sounds: No murmur heard.     No friction rub. No gallop.   Pulmonary:      Effort: Pulmonary effort is normal.      Breath sounds: Normal breath sounds. No wheezing or rales.   Abdominal:      General: Abdomen is flat. Bowel sounds are normal.      Palpations: Abdomen is soft.      Tenderness: There is no abdominal tenderness.   Musculoskeletal:         General: Swelling present.      Right wrist: Swelling present.      Left wrist: Swelling present.      Right hand: Swelling present.      Left hand: Swelling present.   Skin:     Findings: No rash.   Neurological:      Mental Status: She is alert.

## 2025-05-21 ENCOUNTER — HOSPITAL ENCOUNTER (EMERGENCY)
Facility: HOSPITAL | Age: 50
Discharge: HOME | End: 2025-05-21
Attending: EMERGENCY MEDICINE
Payer: COMMERCIAL

## 2025-05-21 VITALS
TEMPERATURE: 98.6 F | SYSTOLIC BLOOD PRESSURE: 153 MMHG | OXYGEN SATURATION: 97 % | HEART RATE: 69 BPM | DIASTOLIC BLOOD PRESSURE: 92 MMHG | RESPIRATION RATE: 18 BRPM

## 2025-05-21 DIAGNOSIS — T14.8XXA WOUND INFECTION: Primary | ICD-10-CM

## 2025-05-21 DIAGNOSIS — L08.9 WOUND INFECTION: Primary | ICD-10-CM

## 2025-05-21 PROCEDURE — 99284 EMERGENCY DEPT VISIT MOD MDM: CPT | Performed by: EMERGENCY MEDICINE

## 2025-05-21 PROCEDURE — 99283 EMERGENCY DEPT VISIT LOW MDM: CPT

## 2025-05-21 RX ORDER — CLINDAMYCIN HYDROCHLORIDE 150 MG/1
450 CAPSULE ORAL 3 TIMES DAILY
Qty: 63 CAPSULE | Refills: 0 | Status: SHIPPED | OUTPATIENT
Start: 2025-05-21 | End: 2025-05-28

## 2025-05-21 RX ORDER — CLINDAMYCIN HYDROCHLORIDE 150 MG/1
450 CAPSULE ORAL ONCE
Status: COMPLETED | OUTPATIENT
Start: 2025-05-21 | End: 2025-05-21

## 2025-05-21 RX ADMIN — CLINDAMYCIN HYDROCHLORIDE 450 MG: 150 CAPSULE ORAL at 20:03

## 2025-05-22 NOTE — ED PROVIDER NOTES
Time reflects when diagnosis was documented in both MDM as applicable and the Disposition within this note       Time User Action Codes Description Comment    5/21/2025  8:00 PM Lauri Campbell Add [T14.8XXA,  L08.9] Wound infection           ED Disposition       ED Disposition   Discharge    Condition   Stable    Date/Time   Wed May 21, 2025  8:00 PM    Comment   Dannie Mcgarry discharge to home/self care.                   Assessment & Plan       Medical Decision Making  Pulse ox 97% on room air indicating adequate oxygenation.      Patient declined any x-ray for foreign body we will start p.o. antibiotics and outpatient follow-up.    Risk  Prescription drug management.             Medications   clindamycin (CLEOCIN) capsule 450 mg (450 mg Oral Given 5/21/25 2003)       ED Risk Strat Scores                    No data recorded                            History of Present Illness       Chief Complaint   Patient presents with    Foot Pain     Pt. was gardening and rock cut bottom of her foot (tiny abrasion) on Saturday. Patient c/o 9/10 pain.       Past Medical History[1]   Past Surgical History[2]   Family History[3]   Social History[4]   E-Cigarette/Vaping    E-Cigarette Use Never User       E-Cigarette/Vaping Substances    Nicotine No     THC No     CBD No     Flavoring No     Other No     Unknown No       I have reviewed and agree with the history as documented.     Patient was gardening last Saturday and stepped on a small rock cutting the heel of her foot.  States she now has pain and is concerned for infection.  States she was exposed to her father who has MRSA in her in his lungs.      History provided by:  Patient   used: No        Review of Systems   Skin:  Positive for wound.   All other systems reviewed and are negative.          Objective       ED Triage Vitals [05/21/25 1953]   Temperature Pulse Blood Pressure Respirations SpO2 Patient Position - Orthostatic VS   98.6 °F (37 °C) 69  153/92 18 97 % Lying      Temp Source Heart Rate Source BP Location FiO2 (%) Pain Score    Oral Monitor Right arm -- 8      Vitals      Date and Time Temp Pulse SpO2 Resp BP Pain Score FACES Pain Rating User   05/21/25 2002 -- -- 97 % -- -- -- -- JMW   05/21/25 1953 98.6 °F (37 °C) 69 97 % 18 153/92 8 -- CS            Physical Exam  Vitals and nursing note reviewed.   Constitutional:       General: She is not in acute distress.    Cardiovascular:      Rate and Rhythm: Normal rate.   Pulmonary:      Effort: Pulmonary effort is normal. No respiratory distress.     Musculoskeletal:        Feet:      Neurological:      General: No focal deficit present.      Mental Status: She is alert and oriented to person, place, and time.         Results Reviewed       None            No orders to display       Procedures    ED Medication and Procedure Management   Prior to Admission Medications   Prescriptions Last Dose Informant Patient Reported? Taking?   Cetirizine HCl (ZYRTEC ALLERGY PO)  Self Yes No   Sig: Take 20 mg by mouth every morning   Jardiance 10 MG TABS tablet   No No   Sig: Take 1 tablet (10 mg total) by mouth daily   PARoxetine (PAXIL) 30 mg tablet   No No   Sig: TAKE 1 TABLET BY MOUTH EVERY MORNING   Tirzepatide 5 MG/0.5ML SOAJ   No No   Sig: Inject 5 mg under the skin every 7 days   amLODIPine (NORVASC) 10 mg tablet   No No   Sig: Take 1 tablet (10 mg total) by mouth daily   atorvastatin (LIPITOR) 10 mg tablet   No No   Sig: Take 1 tablet (10 mg total) by mouth daily   gabapentin (Neurontin) 100 mg capsule   No No   Sig: Take 1 capsule (100 mg total) by mouth 3 (three) times a day   glipiZIDE (GLUCOTROL XL) 5 mg 24 hr tablet   No No   Sig: Take 1 tablet (5 mg total) by mouth daily with breakfast   ibuprofen (MOTRIN) 800 mg tablet   No No   Sig: Take 1 tablet (800 mg total) by mouth 2 (two) times a day as needed (for pain)   levothyroxine 112 mcg tablet   No No   Sig: Take 1 tablet (112 mcg total) by mouth every  morning   ondansetron (ZOFRAN) 4 mg tablet   No No   Sig: Take 1 tablet (4 mg total) by mouth every 8 (eight) hours as needed for nausea or vomiting for up to 4 doses      Facility-Administered Medications: None     Discharge Medication List as of 5/21/2025  8:01 PM        START taking these medications    Details   clindamycin (CLEOCIN) 150 mg capsule Take 3 capsules (450 mg total) by mouth 3 (three) times a day for 7 days, Starting Wed 5/21/2025, Until Wed 5/28/2025, Normal           CONTINUE these medications which have NOT CHANGED    Details   amLODIPine (NORVASC) 10 mg tablet Take 1 tablet (10 mg total) by mouth daily, Starting Wed 9/18/2024, Normal      atorvastatin (LIPITOR) 10 mg tablet Take 1 tablet (10 mg total) by mouth daily, Starting Wed 9/18/2024, Normal      Cetirizine HCl (ZYRTEC ALLERGY PO) Take 20 mg by mouth every morning, Historical Med      gabapentin (Neurontin) 100 mg capsule Take 1 capsule (100 mg total) by mouth 3 (three) times a day, Starting Thu 5/8/2025, Normal      glipiZIDE (GLUCOTROL XL) 5 mg 24 hr tablet Take 1 tablet (5 mg total) by mouth daily with breakfast, Starting Wed 9/18/2024, Normal      ibuprofen (MOTRIN) 800 mg tablet Take 1 tablet (800 mg total) by mouth 2 (two) times a day as needed (for pain), Starting Thu 5/8/2025, Normal      Jardiance 10 MG TABS tablet Take 1 tablet (10 mg total) by mouth daily, Starting Wed 9/18/2024, Normal      levothyroxine 112 mcg tablet Take 1 tablet (112 mcg total) by mouth every morning, Starting Mon 3/30/2020, Normal      ondansetron (ZOFRAN) 4 mg tablet Take 1 tablet (4 mg total) by mouth every 8 (eight) hours as needed for nausea or vomiting for up to 4 doses, Starting Wed 12/11/2024, Normal      PARoxetine (PAXIL) 30 mg tablet TAKE 1 TABLET BY MOUTH EVERY MORNING, Starting Wed 3/19/2025, Normal      Tirzepatide 5 MG/0.5ML SOAJ Inject 5 mg under the skin every 7 days, Starting Mon 3/24/2025, Normal           No discharge procedures on  file.  ED SEPSIS DOCUMENTATION   Time reflects when diagnosis was documented in both MDM as applicable and the Disposition within this note       Time User Action Codes Description Comment    5/21/2025  8:00 PM Shannan Lauri FLORES Add [T14.8XXA,  L08.9] Wound infection                    [1]   Past Medical History:  Diagnosis Date    Abnormal ECG 12/2015    Interventricular conduction relay disorder    Abnormal Pap smear of cervix 01/2015    See medical records from Dr. Rosa    Anxiety     Arthritis     hands    Diabetes mellitus (HCC)     Disease of thyroid gland     hypo    HPV (human papilloma virus) infection 01/2015    See medical records from Dr. Rosa    HTN (hypertension)     Hyperlipidemia     Hypertension     Irregular heart beat     interventricular. conduction relay disorder    Nausea 12/2015    since gastric sleeve    Ovarian cyst     Urinary tract infection     chronic issue    Varicella 03/1985   [2]   Past Surgical History:  Procedure Laterality Date    BARIATRIC SURGERY  Dec 2015    Gastric Sleeve    CERVICAL BIOPSY N/A 3/2/2017    Procedure: BIOPSY LEEP CERVIX;  Surgeon: Lon Rosa MD;  Location: [facility] MAIN OR;  Service:     CERVICAL BIOPSY  W/ LOOP ELECTRODE EXCISION  Mar 2017    See medical records from Dr. Rosa    ESOPHAGOGASTRODUODENOSCOPY      GASTRIC RESTRICTION SURGERY  12/2015    sleeve at age 40    OH EXC TUMOR SOFT TISS FACE&SCALP SUBFASCIAL 2 CM/> N/A 1/3/2024    Procedure: EXCISION OF FOREHEAD LIPOMA WITH COMPLEX CLOSURE;  Surgeon: Luz Diallo MD;  Location: [facility] MAIN OR;  Service: Plastics    OH EXCISION GANGLION WRIST DORSAL/VOLAR PRIMARY Right 12/12/2024    Procedure: EXCISION RIGHT VOLAR WRIST MASS;  Surgeon: Faisal Garcia MD;  Location: [facility] MAIN OR;  Service: Orthopedics    TONSILLECTOMY      adenoids-age 30    WISDOM TOOTH EXTRACTION      x4-age 18   [3]   Family History  Problem Relation Name Age of Onset    Diabetes Mother Marina         diet controlled     Diabetes Father Jonathan         insulin    Other Father Jonathan         agent orange   [4]   Social History  Tobacco Use    Smoking status: Former     Current packs/day: 0.00     Average packs/day: 0.3 packs/day for 10.0 years (2.5 ttl pk-yrs)     Types: Cigarettes     Start date: 1994     Quit date:      Years since quittin.3     Passive exposure: Past    Smokeless tobacco: Never   Vaping Use    Vaping status: Never Used   Substance Use Topics    Alcohol use: Yes     Alcohol/week: 6.0 standard drinks of alcohol     Types: 6 Standard drinks or equivalent per week     Comment: 3-4 weekly Vodka    Drug use: Yes     Frequency: 7.0 times per week     Types: Marijuana     Comment: occasionally        Lauri Campbell DO  26

## 2025-05-28 LAB
ALBUMIN SERPL-MCNC: 3.8 G/DL (ref 3.9–4.9)
ALP SERPL-CCNC: 115 IU/L (ref 44–121)
ALT SERPL-CCNC: 32 IU/L (ref 0–32)
ANA SPECKLED TITR SER: ABNORMAL {TITER}
ANA TITR SER IF: POSITIVE {TITER}
AST SERPL-CCNC: 60 IU/L (ref 0–40)
BASOPHILS # BLD AUTO: 0.1 X10E3/UL (ref 0–0.2)
BASOPHILS NFR BLD AUTO: 2 %
BILIRUB SERPL-MCNC: 0.5 MG/DL (ref 0–1.2)
BUN SERPL-MCNC: 9 MG/DL (ref 6–24)
BUN/CREAT SERPL: 15 (ref 9–23)
C3 SERPL-MCNC: 149 MG/DL (ref 82–167)
C4 SERPL-MCNC: 20 MG/DL (ref 12–38)
CALCIUM SERPL-MCNC: 9.1 MG/DL (ref 8.7–10.2)
CHLORIDE SERPL-SCNC: 103 MMOL/L (ref 96–106)
CO2 SERPL-SCNC: 22 MMOL/L (ref 20–29)
CREAT SERPL-MCNC: 0.59 MG/DL (ref 0.57–1)
EGFR: 110 ML/MIN/1.73
EOSINOPHIL # BLD AUTO: 0.1 X10E3/UL (ref 0–0.4)
EOSINOPHIL NFR BLD AUTO: 5 %
ERYTHROCYTE [DISTWIDTH] IN BLOOD BY AUTOMATED COUNT: 13.6 % (ref 11.7–15.4)
GLOBULIN SER-MCNC: 2.9 G/DL (ref 1.5–4.5)
GLUCOSE SERPL-MCNC: 103 MG/DL (ref 70–99)
HCT VFR BLD AUTO: 42.7 % (ref 34–46.6)
HGB BLD-MCNC: 14.5 G/DL (ref 11.1–15.9)
IMM GRANULOCYTES # BLD: 0 X10E3/UL (ref 0–0.1)
IMM GRANULOCYTES NFR BLD: 0 %
LYMPHOCYTES # BLD AUTO: 0.7 X10E3/UL (ref 0.7–3.1)
LYMPHOCYTES NFR BLD AUTO: 26 %
MCH RBC QN AUTO: 30.8 PG (ref 26.6–33)
MCHC RBC AUTO-ENTMCNC: 34 G/DL (ref 31.5–35.7)
MCV RBC AUTO: 91 FL (ref 79–97)
MONOCYTES # BLD AUTO: 0.4 X10E3/UL (ref 0.1–0.9)
MONOCYTES NFR BLD AUTO: 15 %
NEUTROPHILS # BLD AUTO: 1.3 X10E3/UL (ref 1.4–7)
NEUTROPHILS NFR BLD AUTO: 52 %
PLATELET # BLD AUTO: 200 X10E3/UL (ref 150–450)
POTASSIUM SERPL-SCNC: 3.4 MMOL/L (ref 3.5–5.2)
PROT SERPL-MCNC: 6.7 G/DL (ref 6–8.5)
RBC # BLD AUTO: 4.71 X10E6/UL (ref 3.77–5.28)
RHEUMATOID FACT SERPL-ACNC: <10 IU/ML
SL AMB NOTE:: ABNORMAL
SODIUM SERPL-SCNC: 141 MMOL/L (ref 134–144)
U1 SNRNP AB SER QL: 172 UNITS
WBC # BLD AUTO: 2.5 X10E3/UL (ref 3.4–10.8)

## 2025-06-14 DIAGNOSIS — E78.2 HYPERLIPEMIA, MIXED: ICD-10-CM

## 2025-06-15 RX ORDER — ATORVASTATIN CALCIUM 10 MG/1
10 TABLET, FILM COATED ORAL DAILY
Qty: 90 TABLET | Refills: 1 | Status: SHIPPED | OUTPATIENT
Start: 2025-06-15

## 2025-06-20 ENCOUNTER — TELEPHONE (OUTPATIENT)
Age: 50
End: 2025-06-20

## 2025-06-20 NOTE — TELEPHONE ENCOUNTER
Patient called to schedule NP appointment for bilateral arm numbness/tingling/pain. Scheduled per decision tree; 8/26/25 8 AM MD SHINE Morrow and placed on wait list.    Thank you

## 2025-07-01 ENCOUNTER — OFFICE VISIT (OUTPATIENT)
Age: 50
End: 2025-07-01
Payer: COMMERCIAL

## 2025-07-01 VITALS
DIASTOLIC BLOOD PRESSURE: 80 MMHG | BODY MASS INDEX: 25.05 KG/M2 | HEART RATE: 72 BPM | OXYGEN SATURATION: 97 % | TEMPERATURE: 98.4 F | WEIGHT: 141.4 LBS | SYSTOLIC BLOOD PRESSURE: 129 MMHG | HEIGHT: 63 IN

## 2025-07-01 DIAGNOSIS — R53.82 CHRONIC FATIGUE: ICD-10-CM

## 2025-07-01 DIAGNOSIS — M79.89 BILATERAL HAND SWELLING: ICD-10-CM

## 2025-07-01 DIAGNOSIS — I73.00 RAYNAUD DISEASE WITHOUT GANGRENE: ICD-10-CM

## 2025-07-01 DIAGNOSIS — M79.7 FIBROMYALGIA: ICD-10-CM

## 2025-07-01 DIAGNOSIS — R76.8 POSITIVE SM/RNP ANTIBODY: Primary | ICD-10-CM

## 2025-07-01 DIAGNOSIS — E55.9 VITAMIN D DEFICIENCY: ICD-10-CM

## 2025-07-01 PROCEDURE — 99214 OFFICE O/P EST MOD 30 MIN: CPT | Performed by: STUDENT IN AN ORGANIZED HEALTH CARE EDUCATION/TRAINING PROGRAM

## 2025-07-01 RX ORDER — HYDROXYCHLOROQUINE SULFATE 200 MG/1
300 TABLET, FILM COATED ORAL
Qty: 45 TABLET | Refills: 3 | Status: SHIPPED | OUTPATIENT
Start: 2025-07-01 | End: 2025-10-29

## 2025-07-01 NOTE — PROGRESS NOTES
Name: Dannie Mcgarry      : 1975      MRN: [id number]  Encounter Provider: Anu Chatman MD  Encounter Date: 2025   Encounter department: [facility] [address]  :  Assessment & Plan  Mixed connective tissue disease (HCC)  Patient has history of MCTD based on prior labs showing positive RESHMA and U1 RNP antibody with bilateral hand swelling, and Raynaud's.  She has had some symptoms of possible inflammatory arthritis with stiffness and swelling of the bilateral hands, however we did already try 2 different DMARDs including methotrexate and leflunomide, both of which did not make any difference in her symptoms, and also caused some lab abnormalities(leukopenia mainly) which resulted in us discontinuing it..  At this time she does seem to have active synovitis on exam, significant swelling and tenderness mainly at PIPs .  This seems to be the main persistent symptom that she has, and granted she did have x-rays of the hands in February that mentioned no obvious degenerative changes or erosions but some periarticular osteopenia that could be related to an early onset of inflammatory arthritis.  It is a little atypical to be nonresponding to 2 DMARDs or high doses of ibuprofen that she is currently on if it is coming from inflammation.  Would also want to consider if her hand swelling is not autoimmune related, could it be something like diabetic cheiroarthropathy or neuropathy related, something like CRPS.    Plan:  -Will start  mg daily  -Get eye exam done in 6 mo if she stays on it  -will check MRI of both hands  -RTC 3 mo  Orders:    hydroxychloroquine (PLAQUENIL) 200 mg tablet; Take 1.5 tablets (300 mg total) by mouth daily with breakfast    MRI hand right w contrast; Future    CBC and differential; Future    Fibromyalgia         Raynaud disease without gangrene         Vitamin D deficiency    Orders:    Vitamin D 25 hydroxy; Future    Chronic fatigue    Orders:    Vitamin D 25  hydroxy; Future    Bilateral hand swelling    Orders:    MRI hand right w contrast; Future    MRI hand left w contrast; Future        History of Present Illness   HPI  Dannie Mcgarry is a 50 y.o. female who presents for evaluation of MCTD.     Rheumatic Disease Summary  -First seen November 2024 with high titer positive RNP ab (7.5), + RESHMA (no titer), other labs showed negative scl-70, centromere ab, SSA/SSB, dsDNA, Ward, anti-Jo1 ab.   -presented with features of ?Inflammatory arthritis like RA vs tightness in the skin of fingers that could be similar to scleroderma, also has Raynaud's ,dysphagia, and psoriasis etc.   -She had widespread skin involvement of psoriasis in the past, now on the extensor surfaces (elbows, back of knees). She is using topical clobetasol  -was started on MTX 6 tablets weekly for inflammatory arthropathy but had to stop due to leukopenia, same issue with leflunomide  -Labs showed negative PM/SCL 75 antibody, negative U3 RNP     Labs from this May showed AST mildly elevated, WBC count 2.5 , + RESHMA 1:1280 titer, speckled pattern, + U1RNP 172 normal, normal C3/C4, negative RF, normal ESR and CRP.     Pending labs since last visit include CCP, RNA christ III.    XR of bilateral hands from February showed nor degenerative changes or erosions but mentioned periarticular osteopenia which could be a sign of early inflammatory arthritis.     Since last visit still having hand and feet pain. Swelling in the hands and feet. Feels off balance when walking.     Her Raynaud's is managed with amlodipine 10 mg daily.     She has noticed improvement in her fatigue with more sun exposures.     Takes gabapentin 800 mg in the morning and 1200 mg in the  evening. This has helped with fatigue, sleep and neuropathy.    She also takes motrin 800 mg tablets about 3 in a day.       Review of Systems   Constitutional:  Positive for fatigue.   HENT: Negative.     Eyes: Negative.    Respiratory: Negative.    "  Cardiovascular: Negative.    Gastrointestinal: Negative.    Genitourinary: Negative.    Musculoskeletal:  Positive for arthralgias and joint swelling.   Skin: Negative.    Neurological:  Positive for numbness.          Objective   /80   Pulse 72   Temp 98.4 °F (36.9 °C) (Tympanic)   Ht 5' 3\" (1.6 m)   Wt 64.1 kg (141 lb 6.4 oz)   LMP 11/22/2020 (Approximate)   SpO2 97%   BMI 25.05 kg/m²      Physical Exam  Constitutional:       Appearance: Normal appearance.   HENT:      Head: Normocephalic.     Eyes:      Extraocular Movements: Extraocular movements intact.      Pupils: Pupils are equal, round, and reactive to light.       Cardiovascular:      Pulses: Normal pulses.      Heart sounds: Normal heart sounds.   Pulmonary:      Effort: Pulmonary effort is normal.      Breath sounds: Normal breath sounds.     Musculoskeletal:      Comments: MSK Exam  The following areas have been examined today and do not show any signs of synovitis, tenderness, or restricted ROM unless otherwise specified below:  Neck  Shoulders  Back  Elbows  Wrists  Hands-tsignificant swelling of bilateral hands at the PIPs  Hips  Knees  Ankles  Feet      Neurological:      Mental Status: She is alert and oriented to person, place, and time.           "

## 2025-07-01 NOTE — PATIENT INSTRUCTIONS
Take hydroxychloroquine 300 mg (1.5 tablets daily)    Call Central scheduling: [phone] to schedule MRI of both hands

## 2025-07-09 ENCOUNTER — TELEPHONE (OUTPATIENT)
Age: 50
End: 2025-07-09

## 2025-07-09 DIAGNOSIS — E11.9 TYPE 2 DIABETES MELLITUS WITHOUT COMPLICATION, WITHOUT LONG-TERM CURRENT USE OF INSULIN (HCC): Primary | ICD-10-CM

## 2025-07-09 RX ORDER — ACYCLOVIR 400 MG/1
1 TABLET ORAL ONCE
Qty: 1 EACH | Refills: 0 | Status: SHIPPED | OUTPATIENT
Start: 2025-07-09 | End: 2025-07-09

## 2025-07-09 RX ORDER — ACYCLOVIR 400 MG/1
1 TABLET ORAL
Qty: 9 EACH | Refills: 3 | Status: SHIPPED | OUTPATIENT
Start: 2025-07-09

## 2025-07-09 NOTE — TELEPHONE ENCOUNTER
Pharm called requesting a PA for the following: They stated the started the request on [email or web address]    Dexcom G7 continuous glucose monitoring system.     Please advise

## 2025-07-09 NOTE — TELEPHONE ENCOUNTER
This is not on the patient's active medication list. Please have provider prescribe the medication, add it to the patient's medication list and send it to the patient's chosen pharmacy.  Thank you.

## 2025-07-10 ENCOUNTER — TELEPHONE (OUTPATIENT)
Age: 50
End: 2025-07-10

## 2025-07-10 NOTE — TELEPHONE ENCOUNTER
PA Dexcom G7  Device SUBMITTED    to [employer]       via    [x]CMM-KEY: [id number]  []Surescripts-Case ID #    []Availity-Auth ID #  NDC #    []Faxed to plan   []Other website    []Phone call Case ID #      []PA sent as URGENT    All office notes, labs and other pertaining documents and studies sent. Clinical questions answered. Awaiting determination from insurance company.     Turnaround time for your insurance to make a decision on your Prior Authorization can take 7-21 business days.

## 2025-07-10 NOTE — TELEPHONE ENCOUNTER
PA Dexcom G7 Sensor SUBMITTED    to [employer]      via    [x]CMM-KEY: [id number]  []Surescripts-Case ID #    []Availity-Auth ID #  NDC #    []Faxed to plan   []Other website    []Phone call Case ID #      []PA sent as URGENT    All office notes, labs and other pertaining documents and studies sent. Clinical questions answered. Awaiting determination from insurance company.     Turnaround time for your insurance to make a decision on your Prior Authorization can take 7-21 business days.

## 2025-07-11 NOTE — TELEPHONE ENCOUNTER
PA Dexcom G7  Device DENIED    Reason:(Screenshot if applicable)        Message sent to office clinical pool Yes    Denial letter scanned into Media Yes    We can gladly do an appeal but the process can take about 30-60 days to provide determination. Please have the office staff schedule a Peer to Peer at phone  [phone]. If an appeal is truly warranted please have Provider send clinical documentation to the PA department to support the appeal.     **Please follow up with your patient regarding denial and next steps**

## 2025-07-11 NOTE — TELEPHONE ENCOUNTER
PA Dexcom G7 Sensor DENIED    Reason:(Screenshot if applicable)        Message sent to office clinical pool Yes    Denial letter scanned into Media Yes    We can gladly do an appeal but the process can take about 30-60 days to provide determination. Please have the office staff schedule a Peer to Peer at phone [phone]. If an appeal is truly warranted please have Provider send clinical documentation to the PA department to support the appeal.     **Please follow up with your patient regarding denial and next steps**

## 2025-07-14 NOTE — TELEPHONE ENCOUNTER
Left Pt. A voicemail to call back, when pt calls back please relay the provider message-[facility]

## (undated) DEVICE — SCD SEQUENTIAL COMPRESSION COMFORT SLEEVE LARGE KNEE LENGTH: Brand: KENDALL SCD

## (undated) DEVICE — TOWEL SET X-RAY

## (undated) DEVICE — ELECTRODE LARGE BALL

## (undated) DEVICE — GLOVE SRG BIOGEL 6.5

## (undated) DEVICE — TELFA NON-ADHERENT ABSORBENT DRESSING: Brand: TELFA

## (undated) DEVICE — EXOFIN PRECISION PEN HIGH VISCOSITY TOPICAL SKIN ADHESIVE: Brand: EXOFIN PRECISION PEN, 1G

## (undated) DEVICE — SYRINGE 10ML LL CONTROL TOP

## (undated) DEVICE — CURITY NON-ADHERENT STRIPS: Brand: CURITY

## (undated) DEVICE — ANTIBACTERIAL VIOLET BRAIDED (POLYGLACTIN 910), SYNTHETIC ABSORBABLE SUTURE: Brand: COATED VICRYL

## (undated) DEVICE — PERI/GYN PACK: Brand: CONVERTORS

## (undated) DEVICE — ELECTRODE NEEDLE MEGAFINE 2IN E-Z CLEAN MEGADYNE -0118

## (undated) DEVICE — TIBURON SPLIT SHEET: Brand: CONVERTORS

## (undated) DEVICE — PACK GENERAL LF

## (undated) DEVICE — ALUMI-HAND POSITIONER XLG

## (undated) DEVICE — PADDING CAST 3IN COTTON STRL

## (undated) DEVICE — DISPOSABLE BRIEF/UNDERWEAR

## (undated) DEVICE — SUT ETHILON 5-0 PS-2 18 IN 1666G

## (undated) DEVICE — TIBURON HAND DRAPE: Brand: CONVERTORS

## (undated) DEVICE — CHLORAPREP HI-LITE 26ML ORANGE

## (undated) DEVICE — PAD CAST 4 IN COTTON NON STERILE

## (undated) DEVICE — SUT MONOCRYL 4-0 PS-2 27 IN Y426H

## (undated) DEVICE — EXIDINE 4 PCT

## (undated) DEVICE — GLOVE INDICATOR PI UNDERGLOVE SZ 6.5 BLUE

## (undated) DEVICE — SPONGE RAYTEX

## (undated) DEVICE — STANDARD SURGICAL GOWN, L: Brand: CONVERTORS

## (undated) DEVICE — SPONGE GAUZE 4 X 8 12 PLY STRL LF

## (undated) DEVICE — SUT MONOCRYL 3-0 SH 27 IN Y416H

## (undated) DEVICE — ACE WRAP 4 IN UNSTERILE

## (undated) DEVICE — GLOVE PI ULTRA TOUCH SZ.8.0

## (undated) DEVICE — GLOVE PI ULTRA TOUCH SZ.7.5

## (undated) DEVICE — ELECTRODE LOOP 20 X 12

## (undated) DEVICE — BETHLEHEM UNIVERSAL OUTPATIENT: Brand: CARDINAL HEALTH

## (undated) DEVICE — CORD BIPOLAR 12FT REUSEABLE

## (undated) DEVICE — PREP PAD BNS: Brand: CONVERTORS

## (undated) DEVICE — DRAPE SHEET THREE QUARTER

## (undated) DEVICE — 3M™ STERI-STRIP™ REINFORCED ADHESIVE SKIN CLOSURES, R1547, 1/2 IN X 4 IN (12 MM X 100 MM), 6 STRIPS/ENVELOPE: Brand: 3M™ STERI-STRIP™

## (undated) DEVICE — LUBRICANT SURGILUBE TUBE 4 OZ  FLIP TOP

## (undated) DEVICE — BANDAGE, ESMARK LF STR 4"X9'(20/CS): Brand: CYPRESS

## (undated) DEVICE — LABEL STERILE (RSC) (2-SENSOR CAINE 2-LIDOCAINE 2-HEPARIN)

## (undated) DEVICE — ASTOUND STANDARD SURGICAL GOWN, XL: Brand: CONVERTORS

## (undated) DEVICE — MAXI PAD5.51 X 13.78 IN. (14.0 X 35.0 CM)HEAVYCONTOUREDUNSCENTED: Brand: CURITY

## (undated) DEVICE — NEEDLE 21 G X 1 1/2 SAFETY

## (undated) DEVICE — INTENDED FOR TISSUE SEPARATION, AND OTHER PROCEDURES THAT REQUIRE A SHARP SURGICAL BLADE TO PUNCTURE OR CUT.: Brand: BARD-PARKER ® CARBON RIB-BACK BLADES

## (undated) DEVICE — LIGHT GLOVE GREEN

## (undated) DEVICE — SPECIMEN CONTAINER STERILE PEEL PACK

## (undated) DEVICE — STERILE DOUBLE BASIN SET PACK: Brand: CARDINAL HEALTH

## (undated) DEVICE — COBAN 1 IN UNSTERILE